# Patient Record
Sex: MALE | Race: WHITE | NOT HISPANIC OR LATINO | Employment: OTHER | URBAN - METROPOLITAN AREA
[De-identification: names, ages, dates, MRNs, and addresses within clinical notes are randomized per-mention and may not be internally consistent; named-entity substitution may affect disease eponyms.]

---

## 2023-06-27 ENCOUNTER — APPOINTMENT (OUTPATIENT)
Dept: CT IMAGING | Facility: CLINIC | Age: 68
DRG: 824 | End: 2023-06-27
Attending: EMERGENCY MEDICINE
Payer: MEDICARE

## 2023-06-27 ENCOUNTER — HOSPITAL ENCOUNTER (INPATIENT)
Facility: CLINIC | Age: 68
LOS: 13 days | Discharge: NURSING, REHAB OR LONG-TERM CARE | DRG: 824 | End: 2023-07-10
Attending: PHYSICIAN ASSISTANT | Admitting: INTERNAL MEDICINE
Payer: MEDICARE

## 2023-06-27 ENCOUNTER — APPOINTMENT (OUTPATIENT)
Dept: ULTRASOUND IMAGING | Facility: CLINIC | Age: 68
DRG: 824 | End: 2023-06-27
Attending: EMERGENCY MEDICINE
Payer: MEDICARE

## 2023-06-27 DIAGNOSIS — M79.89 RIGHT LEG SWELLING: Primary | ICD-10-CM

## 2023-06-27 DIAGNOSIS — E87.6 HYPOKALEMIA: ICD-10-CM

## 2023-06-27 DIAGNOSIS — C83.32 DIFFUSE LARGE B-CELL LYMPHOMA OF INTRATHORACIC LYMPH NODES (H): ICD-10-CM

## 2023-06-27 DIAGNOSIS — C16.9 MALIGNANT NEOPLASM OF STOMACH, UNSPECIFIED LOCATION (H): ICD-10-CM

## 2023-06-27 DIAGNOSIS — C79.9 METASTATIC MALIGNANT NEOPLASM, UNSPECIFIED SITE (H): ICD-10-CM

## 2023-06-27 DIAGNOSIS — I10 BENIGN ESSENTIAL HYPERTENSION: ICD-10-CM

## 2023-06-27 DIAGNOSIS — R33.9 URINARY RETENTION WITH INCOMPLETE BLADDER EMPTYING: ICD-10-CM

## 2023-06-27 DIAGNOSIS — D61.89 ANEMIA DUE TO OTHER BONE MARROW FAILURE (H): ICD-10-CM

## 2023-06-27 DIAGNOSIS — M54.9 ACUTE BILATERAL BACK PAIN, UNSPECIFIED BACK LOCATION: ICD-10-CM

## 2023-06-27 LAB
ALBUMIN SERPL BCG-MCNC: 4 G/DL (ref 3.5–5.2)
ALBUMIN UR-MCNC: NEGATIVE MG/DL
ALP SERPL-CCNC: 82 U/L (ref 40–129)
ALT SERPL W P-5'-P-CCNC: 18 U/L (ref 0–70)
ANION GAP SERPL CALCULATED.3IONS-SCNC: 13 MMOL/L (ref 7–15)
APPEARANCE UR: CLEAR
AST SERPL W P-5'-P-CCNC: 19 U/L (ref 0–45)
BASOPHILS # BLD AUTO: 0.1 10E3/UL (ref 0–0.2)
BASOPHILS NFR BLD AUTO: 1 %
BILIRUB SERPL-MCNC: 0.3 MG/DL
BILIRUB UR QL STRIP: NEGATIVE
BUN SERPL-MCNC: 17.3 MG/DL (ref 8–23)
CALCIUM SERPL-MCNC: 8.9 MG/DL (ref 8.8–10.2)
CHLORIDE SERPL-SCNC: 98 MMOL/L (ref 98–107)
COLOR UR AUTO: ABNORMAL
CREAT SERPL-MCNC: 0.74 MG/DL (ref 0.67–1.17)
D DIMER PPP FEU-MCNC: 0.69 UG/ML FEU (ref 0–0.5)
DEPRECATED HCO3 PLAS-SCNC: 24 MMOL/L (ref 22–29)
EOSINOPHIL # BLD AUTO: 0.2 10E3/UL (ref 0–0.7)
EOSINOPHIL NFR BLD AUTO: 3 %
ERYTHROCYTE [DISTWIDTH] IN BLOOD BY AUTOMATED COUNT: 14.6 % (ref 10–15)
GFR SERPL CREATININE-BSD FRML MDRD: >90 ML/MIN/1.73M2
GLUCOSE SERPL-MCNC: 96 MG/DL (ref 70–99)
GLUCOSE UR STRIP-MCNC: NEGATIVE MG/DL
HCT VFR BLD AUTO: 40.5 % (ref 40–53)
HGB BLD-MCNC: 13.8 G/DL (ref 13.3–17.7)
HGB UR QL STRIP: NEGATIVE
IMM GRANULOCYTES # BLD: 0 10E3/UL
IMM GRANULOCYTES NFR BLD: 0 %
KETONES UR STRIP-MCNC: NEGATIVE MG/DL
LEUKOCYTE ESTERASE UR QL STRIP: NEGATIVE
LIPASE SERPL-CCNC: 21 U/L (ref 13–60)
LYMPHOCYTES # BLD AUTO: 2 10E3/UL (ref 0.8–5.3)
LYMPHOCYTES NFR BLD AUTO: 26 %
MCH RBC QN AUTO: 29.1 PG (ref 26.5–33)
MCHC RBC AUTO-ENTMCNC: 34.1 G/DL (ref 31.5–36.5)
MCV RBC AUTO: 85 FL (ref 78–100)
MONOCYTES # BLD AUTO: 0.8 10E3/UL (ref 0–1.3)
MONOCYTES NFR BLD AUTO: 11 %
MUCOUS THREADS #/AREA URNS LPF: PRESENT /LPF
NEUTROPHILS # BLD AUTO: 4.7 10E3/UL (ref 1.6–8.3)
NEUTROPHILS NFR BLD AUTO: 59 %
NITRATE UR QL: NEGATIVE
NRBC # BLD AUTO: 0 10E3/UL
NRBC BLD AUTO-RTO: 0 /100
PH UR STRIP: 6 [PH] (ref 5–7)
PLATELET # BLD AUTO: 246 10E3/UL (ref 150–450)
POTASSIUM SERPL-SCNC: 2.7 MMOL/L (ref 3.4–5.3)
PROT SERPL-MCNC: 6.7 G/DL (ref 6.4–8.3)
RBC # BLD AUTO: 4.74 10E6/UL (ref 4.4–5.9)
RBC URINE: 1 /HPF
SODIUM SERPL-SCNC: 135 MMOL/L (ref 136–145)
SP GR UR STRIP: 1.01 (ref 1–1.03)
SQUAMOUS EPITHELIAL: <1 /HPF
TROPONIN T SERPL HS-MCNC: 7 NG/L
UROBILINOGEN UR STRIP-MCNC: NORMAL MG/DL
WBC # BLD AUTO: 7.9 10E3/UL (ref 4–11)
WBC URINE: <1 /HPF

## 2023-06-27 PROCEDURE — 99285 EMERGENCY DEPT VISIT HI MDM: CPT | Mod: 25

## 2023-06-27 PROCEDURE — 250N000013 HC RX MED GY IP 250 OP 250 PS 637: Performed by: PHYSICIAN ASSISTANT

## 2023-06-27 PROCEDURE — 250N000011 HC RX IP 250 OP 636: Performed by: EMERGENCY MEDICINE

## 2023-06-27 PROCEDURE — 250N000009 HC RX 250: Performed by: EMERGENCY MEDICINE

## 2023-06-27 PROCEDURE — 93005 ELECTROCARDIOGRAM TRACING: CPT

## 2023-06-27 PROCEDURE — 120N000001 HC R&B MED SURG/OB

## 2023-06-27 PROCEDURE — 84484 ASSAY OF TROPONIN QUANT: CPT | Performed by: EMERGENCY MEDICINE

## 2023-06-27 PROCEDURE — 36415 COLL VENOUS BLD VENIPUNCTURE: CPT | Performed by: EMERGENCY MEDICINE

## 2023-06-27 PROCEDURE — 74177 CT ABD & PELVIS W/CONTRAST: CPT | Mod: MA

## 2023-06-27 PROCEDURE — 85025 COMPLETE CBC W/AUTO DIFF WBC: CPT | Performed by: EMERGENCY MEDICINE

## 2023-06-27 PROCEDURE — 83690 ASSAY OF LIPASE: CPT | Performed by: EMERGENCY MEDICINE

## 2023-06-27 PROCEDURE — 80053 COMPREHEN METABOLIC PANEL: CPT | Performed by: EMERGENCY MEDICINE

## 2023-06-27 PROCEDURE — 99222 1ST HOSP IP/OBS MODERATE 55: CPT | Mod: A1 | Performed by: INTERNAL MEDICINE

## 2023-06-27 PROCEDURE — 93971 EXTREMITY STUDY: CPT | Mod: RT

## 2023-06-27 PROCEDURE — 81001 URINALYSIS AUTO W/SCOPE: CPT | Performed by: EMERGENCY MEDICINE

## 2023-06-27 PROCEDURE — 85379 FIBRIN DEGRADATION QUANT: CPT | Performed by: EMERGENCY MEDICINE

## 2023-06-27 RX ORDER — CYCLOBENZAPRINE HCL 5 MG
5-10 TABLET ORAL EVERY 8 HOURS PRN
Status: ON HOLD | COMMUNITY
End: 2023-07-10

## 2023-06-27 RX ORDER — IOPAMIDOL 755 MG/ML
115 INJECTION, SOLUTION INTRAVASCULAR ONCE
Status: COMPLETED | OUTPATIENT
Start: 2023-06-27 | End: 2023-06-27

## 2023-06-27 RX ORDER — VALSARTAN AND HYDROCHLOROTHIAZIDE 320; 25 MG/1; MG/1
1 TABLET, FILM COATED ORAL DAILY
Status: ON HOLD | COMMUNITY
End: 2023-07-10

## 2023-06-27 RX ORDER — ASPIRIN 325 MG
325 TABLET, DELAYED RELEASE (ENTERIC COATED) ORAL AT BEDTIME
COMMUNITY

## 2023-06-27 RX ORDER — VALACYCLOVIR HYDROCHLORIDE 1 G/1
1000 TABLET, FILM COATED ORAL 3 TIMES DAILY
COMMUNITY
End: 2023-06-27

## 2023-06-27 RX ORDER — TRIAMCINOLONE ACETONIDE 1 MG/G
OINTMENT TOPICAL 2 TIMES DAILY
COMMUNITY
End: 2023-06-27

## 2023-06-27 RX ORDER — CHLORAL HYDRATE 500 MG
2 CAPSULE ORAL EVERY MORNING
COMMUNITY

## 2023-06-27 RX ORDER — ATORVASTATIN CALCIUM 10 MG/1
10 TABLET, FILM COATED ORAL DAILY
COMMUNITY

## 2023-06-27 RX ORDER — POTASSIUM CHLORIDE 1500 MG/1
40 TABLET, EXTENDED RELEASE ORAL ONCE
Status: COMPLETED | OUTPATIENT
Start: 2023-06-27 | End: 2023-06-27

## 2023-06-27 RX ADMIN — SODIUM CHLORIDE 100 ML: 9 INJECTION, SOLUTION INTRAVENOUS at 20:34

## 2023-06-27 RX ADMIN — IOPAMIDOL 115 ML: 755 INJECTION, SOLUTION INTRAVENOUS at 20:34

## 2023-06-27 RX ADMIN — POTASSIUM CHLORIDE 40 MEQ: 1500 TABLET, EXTENDED RELEASE ORAL at 21:12

## 2023-06-27 ASSESSMENT — ACTIVITIES OF DAILY LIVING (ADL)
ADLS_ACUITY_SCORE: 35
ADLS_ACUITY_SCORE: 35

## 2023-06-27 NOTE — LETTER
Transition Communication Hand-off for Care Transitions to Next Level of Care Provider    Name: Enrique Dumas  : 1955  MRN #: [id number]  Primary Care Provider: Lori Hallman     Primary Clinic: [address]  [facility] [address]     Reason for Hospitalization:  Hypokalemia [E87.6]  Right leg swelling [M79.89]  Metastatic malignant neoplasm, unspecified site (H) [C79.9]  Admit Date/Time: 2023  7:31 PM  Discharge Date: ***  Payor Source: Payor: MEDICARE / Plan: MEDICARE / Product Type: Medicare /     Readmission Assessment Measure (AYDEE) Risk Score/category: ***    Plan of Care Goals/Milestone Events:   Patient Concerns: ***   Patient Goals:   Short-term ***   Long-term ***   Medical Goals   Short-term ***   Long-term ***         Reason for Communication Hand-off Referral: {CCREASONCMCTNHANDOFFREFERRALCTS:18519}    Discharge Plan:       Concern for non-adherence with plan of care:   Y/N ***  Discharge Needs Assessment:  Needs      Flowsheet Row Most Recent Value   Equipment Currently Used at Home none   # of Referrals Placed by  Post Acute Facilities, Senior Linkage Line, Transportation            Already enrolled in Tele-monitoring program and name of program:  ***  Follow-up specialty is recommended: {YES / NO:02494}    Follow-up plan:    Future Appointments   Date Time Provider Department Center   7/10/2023  1:30 PM  OT 2 [facility]       Any outstanding tests or procedures:        Referrals       Future Labs/Procedures    Occupational Therapy Adult Consult     Comments:    Evaluate and treat as clinically indicated.    Reason:  metastatic lymphoma    Physical Therapy Adult Consult     Comments:    Evaluate and treat as clinically indicated.    Reason:  metastatic Lymphoma              Head Recommendations:      CADEN Xie    AVS/Discharge Summary is the source of truth; this is a helpful guide for improved communication of patient story

## 2023-06-27 NOTE — ED NOTES
"PIT/Triage Evaluation    Patient presented with right lower extremity swelling as well as left flank pain.  They are visiting from [facility] getting ready for a trip to [place].  He denies shortness of breath.  No history of blood clot.  He states he has had abdominal pain for approximately 6 months and has had an ultrasound and lab work which was unremarkable.    Exam is notable for:  /77   Pulse 76   Temp 97.3  F (36.3  C) (Temporal)   Resp 20   Ht 1.803 m (5' 11\")   Wt 104.3 kg (230 lb)   SpO2 96%   BMI 32.08 kg/m     General:  Alert, interactive  Cardiovascular:  Well perfused, right lower extremity is mildly swollen  Lungs:  No respiratory distress, no accessory muscle use  Neuro:  Moving all 4 extremities  Skin:  Warm, dry  Psych:  Normal affect    Appropriate interventions for symptom management were initiated if applicable.  Appropriate diagnostic tests were initiated if indicated.    Important information for subsequent clinician:  Screening blood work, EKG, right lower extremity ultrasound ordered.    I briefly evaluated the patient and developed an initial plan of care. I discussed this plan and explained that this brief interaction does not constitute a full evaluation. Patient/family understands that they should wait to be fully evaluated and discuss any test results with another clinician prior to leaving the hospital.       Trigger, Phil Cordoba MD  06/27/23 4486    "

## 2023-06-27 NOTE — ED TRIAGE NOTES
Pt reports having increased RLE edema. Pt noted increased swelling this morning.      Triage Assessment     Row Name 06/27/23 1422       Triage Assessment (Adult)    Airway WDL WDL       Respiratory WDL    Respiratory WDL WDL       Skin Circulation/Temperature WDL    Skin Circulation/Temperature WDL X  RLE edema       Cardiac WDL    Cardiac WDL WDL       Peripheral/Neurovascular WDL    Peripheral Neurovascular WDL X  Bilat LE numbness.       Cognitive/Neuro/Behavioral WDL    Cognitive/Neuro/Behavioral WDL WDL

## 2023-06-28 ENCOUNTER — APPOINTMENT (OUTPATIENT)
Dept: MRI IMAGING | Facility: CLINIC | Age: 68
DRG: 824 | End: 2023-06-28
Attending: PHYSICIAN ASSISTANT
Payer: MEDICARE

## 2023-06-28 ENCOUNTER — APPOINTMENT (OUTPATIENT)
Dept: CT IMAGING | Facility: CLINIC | Age: 68
DRG: 824 | End: 2023-06-28
Attending: INTERNAL MEDICINE
Payer: MEDICARE

## 2023-06-28 LAB
ALBUMIN SERPL BCG-MCNC: 4.1 G/DL (ref 3.5–5.2)
ALP SERPL-CCNC: 91 U/L (ref 40–129)
ALT SERPL W P-5'-P-CCNC: 19 U/L (ref 0–70)
ANION GAP SERPL CALCULATED.3IONS-SCNC: 12 MMOL/L (ref 7–15)
AST SERPL W P-5'-P-CCNC: 21 U/L (ref 0–45)
ATRIAL RATE - MUSE: 85 BPM
BILIRUB SERPL-MCNC: 0.7 MG/DL
BUN SERPL-MCNC: 17.6 MG/DL (ref 8–23)
CALCIUM SERPL-MCNC: 9.4 MG/DL (ref 8.8–10.2)
CHLORIDE SERPL-SCNC: 94 MMOL/L (ref 98–107)
CREAT SERPL-MCNC: 0.81 MG/DL (ref 0.67–1.17)
DEPRECATED HCO3 PLAS-SCNC: 26 MMOL/L (ref 22–29)
DIASTOLIC BLOOD PRESSURE - MUSE: NORMAL MMHG
ERYTHROCYTE [DISTWIDTH] IN BLOOD BY AUTOMATED COUNT: 14.6 % (ref 10–15)
GFR SERPL CREATININE-BSD FRML MDRD: >90 ML/MIN/1.73M2
GLUCOSE SERPL-MCNC: 111 MG/DL (ref 70–99)
HCT VFR BLD AUTO: 43.3 % (ref 40–53)
HGB BLD-MCNC: 14.7 G/DL (ref 13.3–17.7)
INTERPRETATION ECG - MUSE: NORMAL
MCH RBC QN AUTO: 29.4 PG (ref 26.5–33)
MCHC RBC AUTO-ENTMCNC: 33.9 G/DL (ref 31.5–36.5)
MCV RBC AUTO: 87 FL (ref 78–100)
P AXIS - MUSE: -21 DEGREES
PLATELET # BLD AUTO: 244 10E3/UL (ref 150–450)
POTASSIUM SERPL-SCNC: 3.6 MMOL/L (ref 3.4–5.3)
PR INTERVAL - MUSE: 194 MS
PROT SERPL-MCNC: 7.1 G/DL (ref 6.4–8.3)
QRS DURATION - MUSE: 78 MS
QT - MUSE: 362 MS
QTC - MUSE: 430 MS
R AXIS - MUSE: -6 DEGREES
RBC # BLD AUTO: 5 10E6/UL (ref 4.4–5.9)
SODIUM SERPL-SCNC: 132 MMOL/L (ref 136–145)
SYSTOLIC BLOOD PRESSURE - MUSE: NORMAL MMHG
T AXIS - MUSE: -17 DEGREES
UPPER EUS: NORMAL
UPPER GI ENDOSCOPY: NORMAL
VENTRICULAR RATE- MUSE: 85 BPM
WBC # BLD AUTO: 7 10E3/UL (ref 4–11)

## 2023-06-28 PROCEDURE — 250N000011 HC RX IP 250 OP 636: Performed by: EMERGENCY MEDICINE

## 2023-06-28 PROCEDURE — C9113 INJ PANTOPRAZOLE SODIUM, VIA: HCPCS | Mod: JZ | Performed by: PHYSICIAN ASSISTANT

## 2023-06-28 PROCEDURE — G1010 CDSM STANSON: HCPCS

## 2023-06-28 PROCEDURE — 88342 IMHCHEM/IMCYTCHM 1ST ANTB: CPT | Mod: 26 | Performed by: PATHOLOGY

## 2023-06-28 PROCEDURE — G0500 MOD SEDAT ENDO SERVICE >5YRS: HCPCS | Performed by: INTERNAL MEDICINE

## 2023-06-28 PROCEDURE — 250N000009 HC RX 250: Performed by: INTERNAL MEDICINE

## 2023-06-28 PROCEDURE — 99223 1ST HOSP IP/OBS HIGH 75: CPT | Performed by: INTERNAL MEDICINE

## 2023-06-28 PROCEDURE — 120N000001 HC R&B MED SURG/OB

## 2023-06-28 PROCEDURE — 43242 EGD US FINE NEEDLE BX/ASPIR: CPT | Performed by: INTERNAL MEDICINE

## 2023-06-28 PROCEDURE — 88271 CYTOGENETICS DNA PROBE: CPT | Performed by: INTERNAL MEDICINE

## 2023-06-28 PROCEDURE — 258N000003 HC RX IP 258 OP 636: Performed by: INTERNAL MEDICINE

## 2023-06-28 PROCEDURE — 250N000011 HC RX IP 250 OP 636: Performed by: INTERNAL MEDICINE

## 2023-06-28 PROCEDURE — 72141 MRI NECK SPINE W/O DYE: CPT | Mod: MF

## 2023-06-28 PROCEDURE — 88341 IMHCHEM/IMCYTCHM EA ADD ANTB: CPT | Mod: 26 | Performed by: PATHOLOGY

## 2023-06-28 PROCEDURE — 88305 TISSUE EXAM BY PATHOLOGIST: CPT | Mod: 26 | Performed by: PATHOLOGY

## 2023-06-28 PROCEDURE — 250N000012 HC RX MED GY IP 250 OP 636 PS 637: Performed by: INTERNAL MEDICINE

## 2023-06-28 PROCEDURE — 36415 COLL VENOUS BLD VENIPUNCTURE: CPT | Performed by: INTERNAL MEDICINE

## 2023-06-28 PROCEDURE — 250N000011 HC RX IP 250 OP 636: Mod: JZ | Performed by: PHYSICIAN ASSISTANT

## 2023-06-28 PROCEDURE — 80053 COMPREHEN METABOLIC PANEL: CPT | Performed by: INTERNAL MEDICINE

## 2023-06-28 PROCEDURE — 250N000013 HC RX MED GY IP 250 OP 250 PS 637: Performed by: INTERNAL MEDICINE

## 2023-06-28 PROCEDURE — 88305 TISSUE EXAM BY PATHOLOGIST: CPT | Mod: TC | Performed by: INTERNAL MEDICINE

## 2023-06-28 PROCEDURE — 43239 EGD BIOPSY SINGLE/MULTIPLE: CPT | Performed by: INTERNAL MEDICINE

## 2023-06-28 PROCEDURE — 85014 HEMATOCRIT: CPT | Performed by: INTERNAL MEDICINE

## 2023-06-28 PROCEDURE — 0DB68ZX EXCISION OF STOMACH, VIA NATURAL OR ARTIFICIAL OPENING ENDOSCOPIC, DIAGNOSTIC: ICD-10-PCS | Performed by: INTERNAL MEDICINE

## 2023-06-28 PROCEDURE — 88369 M/PHMTRC ALYSISHQUANT/SEMIQ: CPT | Mod: 26 | Performed by: MEDICAL GENETICS

## 2023-06-28 PROCEDURE — 88368 INSITU HYBRIDIZATION MANUAL: CPT | Mod: 26 | Performed by: MEDICAL GENETICS

## 2023-06-28 PROCEDURE — 99232 SBSQ HOSP IP/OBS MODERATE 35: CPT | Performed by: INTERNAL MEDICINE

## 2023-06-28 PROCEDURE — 88360 TUMOR IMMUNOHISTOCHEM/MANUAL: CPT | Mod: 26 | Performed by: PATHOLOGY

## 2023-06-28 RX ORDER — LIDOCAINE 40 MG/G
CREAM TOPICAL
Status: CANCELLED | OUTPATIENT
Start: 2023-06-28

## 2023-06-28 RX ORDER — ONDANSETRON 2 MG/ML
4 INJECTION INTRAMUSCULAR; INTRAVENOUS EVERY 6 HOURS PRN
Status: DISCONTINUED | OUTPATIENT
Start: 2023-06-28 | End: 2023-07-10 | Stop reason: HOSPADM

## 2023-06-28 RX ORDER — CYCLOBENZAPRINE HCL 5 MG
5-10 TABLET ORAL EVERY 8 HOURS PRN
Status: DISCONTINUED | OUTPATIENT
Start: 2023-06-28 | End: 2023-07-10 | Stop reason: HOSPADM

## 2023-06-28 RX ORDER — NALOXONE HYDROCHLORIDE 0.4 MG/ML
0.4 INJECTION, SOLUTION INTRAMUSCULAR; INTRAVENOUS; SUBCUTANEOUS
Status: DISCONTINUED | OUTPATIENT
Start: 2023-06-28 | End: 2023-07-10 | Stop reason: HOSPADM

## 2023-06-28 RX ORDER — FENTANYL CITRATE 50 UG/ML
INJECTION, SOLUTION INTRAMUSCULAR; INTRAVENOUS PRN
Status: DISCONTINUED | OUTPATIENT
Start: 2023-06-28 | End: 2023-06-28 | Stop reason: HOSPADM

## 2023-06-28 RX ORDER — FLUMAZENIL 0.1 MG/ML
0.2 INJECTION, SOLUTION INTRAVENOUS
Status: ACTIVE | OUTPATIENT
Start: 2023-06-28 | End: 2023-06-29

## 2023-06-28 RX ORDER — FLUMAZENIL 0.1 MG/ML
0.2 INJECTION, SOLUTION INTRAVENOUS
Status: DISCONTINUED | OUTPATIENT
Start: 2023-06-28 | End: 2023-06-28

## 2023-06-28 RX ORDER — NALOXONE HYDROCHLORIDE 0.4 MG/ML
0.2 INJECTION, SOLUTION INTRAMUSCULAR; INTRAVENOUS; SUBCUTANEOUS
Status: DISCONTINUED | OUTPATIENT
Start: 2023-06-28 | End: 2023-07-10 | Stop reason: HOSPADM

## 2023-06-28 RX ORDER — OXYCODONE HYDROCHLORIDE 5 MG/1
5 TABLET ORAL EVERY 4 HOURS PRN
Status: DISCONTINUED | OUTPATIENT
Start: 2023-06-28 | End: 2023-06-29

## 2023-06-28 RX ORDER — MORPHINE SULFATE 4 MG/ML
4 INJECTION, SOLUTION INTRAMUSCULAR; INTRAVENOUS ONCE
Status: COMPLETED | OUTPATIENT
Start: 2023-06-28 | End: 2023-06-28

## 2023-06-28 RX ORDER — SODIUM CHLORIDE, SODIUM LACTATE, POTASSIUM CHLORIDE, CALCIUM CHLORIDE 600; 310; 30; 20 MG/100ML; MG/100ML; MG/100ML; MG/100ML
INJECTION, SOLUTION INTRAVENOUS CONTINUOUS
Status: ACTIVE | OUTPATIENT
Start: 2023-06-28 | End: 2023-06-29

## 2023-06-28 RX ORDER — HYDROMORPHONE HYDROCHLORIDE 1 MG/ML
0.3 INJECTION, SOLUTION INTRAMUSCULAR; INTRAVENOUS; SUBCUTANEOUS
Status: DISCONTINUED | OUTPATIENT
Start: 2023-06-28 | End: 2023-07-10 | Stop reason: HOSPADM

## 2023-06-28 RX ORDER — LIDOCAINE 40 MG/G
CREAM TOPICAL
Status: DISCONTINUED | OUTPATIENT
Start: 2023-06-28 | End: 2023-07-02

## 2023-06-28 RX ORDER — ATORVASTATIN CALCIUM 10 MG/1
10 TABLET, FILM COATED ORAL DAILY
Status: DISCONTINUED | OUTPATIENT
Start: 2023-06-28 | End: 2023-07-10 | Stop reason: HOSPADM

## 2023-06-28 RX ORDER — DEXAMETHASONE 4 MG/1
4 TABLET ORAL EVERY 12 HOURS SCHEDULED
Status: DISCONTINUED | OUTPATIENT
Start: 2023-06-28 | End: 2023-07-06 | Stop reason: ALTCHOICE

## 2023-06-28 RX ORDER — ONDANSETRON 4 MG/1
4 TABLET, ORALLY DISINTEGRATING ORAL EVERY 6 HOURS PRN
Status: DISCONTINUED | OUTPATIENT
Start: 2023-06-28 | End: 2023-07-10 | Stop reason: HOSPADM

## 2023-06-28 RX ADMIN — ATORVASTATIN CALCIUM 10 MG: 10 TABLET, FILM COATED ORAL at 08:42

## 2023-06-28 RX ADMIN — SODIUM CHLORIDE, POTASSIUM CHLORIDE, SODIUM LACTATE AND CALCIUM CHLORIDE: 600; 310; 30; 20 INJECTION, SOLUTION INTRAVENOUS at 08:05

## 2023-06-28 RX ADMIN — HYDROMORPHONE HYDROCHLORIDE 0.3 MG: 1 INJECTION, SOLUTION INTRAMUSCULAR; INTRAVENOUS; SUBCUTANEOUS at 19:06

## 2023-06-28 RX ADMIN — OXYCODONE HYDROCHLORIDE 5 MG: 5 TABLET ORAL at 00:32

## 2023-06-28 RX ADMIN — HYDROCHLOROTHIAZIDE: 25 TABLET ORAL at 08:42

## 2023-06-28 RX ADMIN — OXYCODONE HYDROCHLORIDE 5 MG: 5 TABLET ORAL at 14:14

## 2023-06-28 RX ADMIN — HYDROMORPHONE HYDROCHLORIDE 0.3 MG: 1 INJECTION, SOLUTION INTRAMUSCULAR; INTRAVENOUS; SUBCUTANEOUS at 07:17

## 2023-06-28 RX ADMIN — HYDROMORPHONE HYDROCHLORIDE 0.3 MG: 1 INJECTION, SOLUTION INTRAMUSCULAR; INTRAVENOUS; SUBCUTANEOUS at 10:27

## 2023-06-28 RX ADMIN — PANTOPRAZOLE SODIUM 40 MG: 40 INJECTION, POWDER, FOR SOLUTION INTRAVENOUS at 08:42

## 2023-06-28 RX ADMIN — HYDROMORPHONE HYDROCHLORIDE 0.3 MG: 1 INJECTION, SOLUTION INTRAMUSCULAR; INTRAVENOUS; SUBCUTANEOUS at 12:58

## 2023-06-28 RX ADMIN — DEXAMETHASONE 4 MG: 4 TABLET ORAL at 21:44

## 2023-06-28 RX ADMIN — MORPHINE SULFATE 4 MG: 4 INJECTION, SOLUTION INTRAMUSCULAR; INTRAVENOUS at 01:23

## 2023-06-28 ASSESSMENT — ACTIVITIES OF DAILY LIVING (ADL)
ADLS_ACUITY_SCORE: 26
ADLS_ACUITY_SCORE: 27
ADLS_ACUITY_SCORE: 27
ADLS_ACUITY_SCORE: 35
ADLS_ACUITY_SCORE: 27
ADLS_ACUITY_SCORE: 27
ADLS_ACUITY_SCORE: 35
ADLS_ACUITY_SCORE: 27
ADLS_ACUITY_SCORE: 35
ADLS_ACUITY_SCORE: 35
ADLS_ACUITY_SCORE: 27
ADLS_ACUITY_SCORE: 27

## 2023-06-28 NOTE — PROGRESS NOTES
RECEIVING UNIT ED HANDOFF REVIEW    ED Nurse Handoff Report was reviewed by: Víctor Rivera RN on June 28, 2023 at 4:08 AM

## 2023-06-28 NOTE — PROGRESS NOTES
[facility]    Internal Medicine Hospitalist Progress Note  06/28/2023  I evaluated patient on the above date.    Rohan Nickerson Jr., MD  [phone] (p)  Text Page  Vocera        Assessment & Plan New actions/orders today (06/28/2023) are underlined. All lab results in the assessment and plan were reviewed.    Enrique Dumas is a 67 year old male with history including HTN; HLD; and congenital [place]; who presented 6/27/2023 with right leg swelling with recent back pain. 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     On initially evaluation, pt was afebrile, hemodynamically stable, not hypoxic. ECG showed SR with NSTWA inferior limb leads, no acute ischemic changes. Labs notable for CBC normal; BMP with sodium 135, potassium 2.7; LFT's and lipase normal; trop negative; d-dimer 0.69; UA negative for signs of infection.    Right lower extremity US 6/28 negative for DVT.     CT CAP 6/28 showed no evidence for pulmonary emboli; proximal gastric wall thickening concerning for an infiltrative or neoplastic process which included a soft tissue mass along the posterior margin of the proximal stomach confluent with the adjacent spleen and pancreatic tail; sclerotic changes involving the T10 -T12 vertebral bodies with destructive changes of T12 and abnormal surrounding paravertebral soft tissue extending into the posterior mediastinum, findings also concerning for a neoplastic process; indeterminate 9 mm pulmonary nodule right lung apex could represent a metastatic lesion; hypodense right hepatic lobe lesion concerning for metastasis. Splenic metastasis also possible.    MRI C-spine and L-spine 6/27 negative for acute findings. MRI T-spine 6/27 showed diffuse infiltrative process of the bone marrow of the thoracic spine both anteriorly and posteriorly with extraosseous extension a diffuse involvement of the neural foramen consistent with diffuse  metastatic disease; extraosseous extension leads canal compromise and neural foraminal narrowing from the T8 to the T10-T11 disc space level; no abnormal signal thoracic spinal cord; prominent involvement of the posterior ribs in the thoracic region with extension into the soft tissues.      Suspected malignancy involving the stomach/GI and thoracic spine.  Back pain and abdominal pain, suspect related to above.  * Initial presentation and imaging as above. Pt presented with right lower extremity edema but had been having back pain since the beginning of the year; also had c/o abdominal pain. GI and Oncology consulted on admit. Started on IV pantoprazole on admit.  - Continue NPO for now.  - Continue LR at 75 ml/hr.  - Continue pantoprazole IV daily.  - Plan EGD and EUS today 6/28.  - Oncology consult pending.  - Continue PRN acetaminophen, PRN cyclobenzaprine, PRN oxycodone, PRN IV hydromorphone; minimize opioids as able.  - Appreciate consultant help - I d/w GI 6/28.    RLE swelling, question related to above.  * Initial presentation as above. US showed no evidence of DVT.  - Continue to keep right lower extremity elevated.  - Malignancy workup as above.    Hypertension (benign essential).  [PTA: valsartan-HCTZ 320-25 mg daily.]  * Valsartan-HCTZ held on admit due to hypokalemia.  - Continue to hold valsartan-HCTZ.    Hypokalemia.  * Potassium low on admit, suspect due to decreased PO intake and diuretic. PTA diuretic held on admit.  Recent Labs   Lab 06/27/23  1856   POTASSIUM 2.7*   - Continue PRN K replacement.    Hyperlipidemia/dyslipidemia.  - Continue atorvastatin.      Clinically Significant Risk Factors Present on Admission        # Hypokalemia: Lowest K = 2.7 mmol/L in last 2 days, will replace as needed         # Drug Induced Platelet Defect: home medication list includes an antiplatelet medication   # Hypertension: Home medication list includes antihypertensive(s)      # Obesity: Estimated body mass  "index is 32.08 kg/m  as calculated from the following:    Height as of this encounter: 1.803 m (5' 11\").    Weight as of this encounter: 104.3 kg (230 lb).              COVID-19 testing.  COVID-19 PCR Results        9/20/2021    10:02   COVID-19 PCR Results   COVID-19 Virus by PCR (External Result) Negative        Details          This result is from an external source.         COVID-19 Antibody Results, Testing for Immunity         No data to display                Diet: NPO per Anesthesia Guidelines for Procedure/Surgery Except for: Meds    Prophylaxis: PCD's, ambulation.   Downing Catheter: Not present  Lines: None     Code Status: Full Code    Disposition Plan   Expected discharge: 1-2d recommended to prior living arrangement pending above.  Entered: Rohan Nickerson MD 06/28/2023, 7:18 AM       Communication.  - I d/w pt's wife 6/28.    Interval History    Back and abdominal pain better with meds.  No N/V.    -Data reviewed today: I reviewed all new labs and imaging over the last 24 hours. I personally reviewed no images or EKG's today.    Physical Exam    , Blood pressure 130/78, pulse 80, temperature 97.3  F (36.3  C), temperature source Temporal, resp. rate 18, height 1.803 m (5' 11\"), weight 104.3 kg (230 lb), SpO2 97 %. O2 Device: None (Room air)    Vitals:    06/27/23 1845   Weight: 104.3 kg (230 lb)     Vital Signs with Ranges  Temp:  [97.3  F (36.3  C)] 97.3  F (36.3  C)  Pulse:  [76-80] 80  Resp:  [18-20] 18  BP: (120-130)/(77-78) 130/78  SpO2:  [96 %-97 %] 97 %  Patient Vitals for the past 24 hrs:   BP Temp Temp src Pulse Resp SpO2 Height Weight   06/27/23 1957 130/78 -- -- 80 18 97 % -- --   06/27/23 1845 120/77 97.3  F (36.3  C) Temporal 76 20 96 % 1.803 m (5' 11\") 104.3 kg (230 lb)     I/O's Last 24 hours  No intake/output data recorded.    Constitutional: Awake, alert, oriented.  Respiratory: Diminished in bases. No crackles or wheezes.  Cardiovascular: RRR, no m/r/g.  GI: Mild distension, nt to " light touch, few BS.  Skin/Integumen:   Other:        Data    Labs reviewed.  Recent Labs   Lab 06/27/23 1856   WBC 7.9   HGB 13.8   MCV 85      *   POTASSIUM 2.7*   CHLORIDE 98   CO2 24   BUN 17.3   CR 0.74   ANIONGAP 13   ESTEFANY 8.9   GLC 96   ALBUMIN 4.0   PROTTOTAL 6.7   BILITOTAL 0.3   ALKPHOS 82   ALT 18   AST 19   LIPASE 21     Recent Labs   Lab Test 06/27/23 1856   TROPONIN T HIGH SENSITIVITY 7     Recent Labs   Lab 06/27/23  1856   GLC 96     No lab results found.    No results for input(s): INR, [id number] in the last 168 hours.  Recent Labs   Lab 06/27/23 1856   WBC 7.9   DD 0.69*       MICRO:  CULTURES (INCLUDING BLOOD AND URINE):  No lab results found in last 7 days.    Recent Results (from the past 24 hour(s))   US Lower Extremity Venous Duplex Right    Narrative    EXAM: US LOWER EXTREMITY VENOUS DUPLEX RIGHT  LOCATION: [facility]  DATE: 6/27/2023    INDICATION: Right lower extremity swelling.  COMPARISON: None available.  TECHNIQUE: Venous Duplex ultrasound of the right lower extremity with and without compression, augmentation and duplex. Color flow and spectral Doppler with waveform analysis performed.    FINDINGS: Exam includes the common femoral, femoral, popliteal, and contralateral common femoral veins as well as segmentally visualized deep calf veins and greater saphenous vein.     RIGHT: No deep vein thrombosis. No superficial thrombophlebitis.       Impression    IMPRESSION:    No deep venous thrombosis in the visualized right lower extremity.   CT Chest (PE) Abdomen Pelvis w Contrast    Narrative    EXAM: CT CHEST PE ABDOMEN PELVIS W CONTRAST  LOCATION: [facility]  DATE: 6/27/2023    INDICATION: Left flank pain, abdominal pain for months.  Elevated D dimer.  COMPARISON: None.  TECHNIQUE: CT chest pulmonary angiogram and routine CT abdomen pelvis with IV contrast. Arterial phase through the chest and venous phase through the  abdomen and pelvis. Multiplanar reformats and MIP reconstructions were performed. Dose reduction   techniques were used.   CONTRAST: 115mL Isovue 370    FINDINGS:  ANGIOGRAM CHEST: Pulmonary arteries are normal caliber and negative for pulmonary emboli. Thoracic aorta is negative for dissection. No CT evidence of right heart strain.     LUNGS AND PLEURA: Elevated right hemidiaphragm. Mosaic attenuation pattern both lower lobes. No acute infiltrates or effusions. 9 mm pulmonary nodule right upper lobe on image 56 of series 6.    MEDIASTINUM/AXILLAE: Calcified mediastinal and left hilar lymph nodes. Soft tissue thickening within the posterior mediastinum surrounding the descending thoracic aorta and obscuring the retrocrural space.    CORONARY ARTERY CALCIFICATION: Moderate.    HEPATOBILIARY: Diffuse fatty infiltration of the liver. Subtle hypodense right hepatic lobe lesion measuring 3.1 x 1.6 cm on image 45 of series 11.    PANCREAS: Soft tissue thickening arising from or abutting the pancreatic tail and confluent with the adjacent gastric wall and medial aspect of the spleen. The head and body of the pancreas are normal. No pancreatic ductal dilatation.    SPLEEN: Calcified granulomas and several subtle hypodense splenic lesions.    ADRENAL GLANDS: Normal.    KIDNEYS/BLADDER: Normal.    BOWEL: Wall thickening involving the cardia and proximal and mid body of the stomach. Additional soft tissue prominence adjacent to the posterior gastric wall measuring 5.1 x 1.8 cm on image 51 of series 11, abutting the medial margin of the spleen and   the pancreatic tail. The small bowel and colon are unremarkable. No evidence for obstruction. Normal appendix.    LYMPH NODES: Enlarged left lobe along the posterior gastric wall measuring 1.7 x 1.3 cm image 57 of series 11.    VASCULATURE: Incidental retroaortic left renal vein.    PELVIC ORGANS: Prostatic hypertrophy. No pelvic ascites.    MUSCULOSKELETAL: Sclerotic changes and  destructive changes of the T10 vertebral body. Additional sclerotic changes of the T10 and T11 vertebral bodies, with soft tissue thickening in the paravertebral soft tissues extending into the retroperitoneum and   surrounding the descending thoracic aorta as described above. Additional sclerotic vertebral bodies upper thoracic spine. Left inguinal hernia contains fat and a knuckle of nondilated proximal sigmoid colon.      Impression    IMPRESSION:  1.  No evidence for pulmonary emboli.  2.  Mosaic attenuation both lower lobes suggesting air trapping.  3.  No renal calculi or hydronephrosis.  4.  Proximal gastric wall thickening concerning for an infiltrative or neoplastic process. This includes a soft tissue mass along the posterior margin of the proximal stomach confluent with the adjacent spleen and pancreatic tail. GI consultation   recommended.  5.  Sclerotic changes involving the T10 -T12 vertebral bodies with destructive changes of T12 and abnormal surrounding paravertebral soft tissue extending into the posterior mediastinum. Findings also concerning for a neoplastic process. MRI of the spine   recommended for further evaluation.  6.  Indeterminate 9 mm pulmonary nodule right lung apex could represent a metastatic lesion.  7.  Hypodense right hepatic lobe lesion concerning for metastasis. Splenic metastasis also possible.  8.  Results discussed with Dr. Phil Hamm on 06/27/2023 at 9:35 PM.   Cervical spine MRI w/o contrast    Narrative    EXAM: MR CERVICAL SPINE W/O CONTRAST  LOCATION: [facility]  DATE: 6/28/2023    INDICATION: Back pain with possible metastatic disease.  COMPARISON: None.  TECHNIQUE: MRI Cervical Spine without IV contrast.    FINDINGS:   There is good anatomic alignment of the cervical spine. The vertebral body heights and the disc space heights are well-maintained throughout and have appropriate signal characteristics for the patient's age. There is no  evidence of bone marrow edema.   There is no abnormal signal or change in size of the cervical spinal cord. There is no evidence of an intracanal mass or hemorrhage. No extraspinal abnormality.    Craniovertebral junction and C1-C2: Normal.    C2-C3: Normal disc height. No herniation. Normal facets. No spinal canal or neural foraminal stenosis.     C3-C4: Normal disc height. No herniation. Normal facets. No spinal canal or neural foraminal stenosis.     C4-C5: Normal disc height. No herniation. Normal facets. No spinal canal or neural foraminal stenosis.     C5-C6: There is a mild loss of disc space height and signal. There is a shallow posterior disc osteophyte complex more prominent to the left posterior lateral region. There is no evidence of canal compromise. This along with the facet arthropathy and   uncinate spurring leads to moderate left neural foraminal narrowing.     C6-C7: There is a mild loss of disc space height and signal. There is a shallow right paracentral disc protrusion but no evidence of canal compromise. There is prominent left facet arthropathy leading to moderate left neural foraminal narrowing.     C7-T1: Normal disc height. No herniation. Normal facets. No spinal canal or neural foraminal stenosis.      Impression    IMPRESSION:  1.  Good anatomic alignment and vertebral body heights maintained.  2.  No abnormal signal cervical spinal cord.  3.  No evidence of canal compromise but neural foraminal narrowing as described above.     Thoracic spine MRI w/o contrast    Narrative    EXAM: MR THORACIC SPINE W/O CONTRAST  LOCATION: [facility]  DATE: 6/28/2023    INDICATION: metastatic disease, sclerotic lesions at T10 T12 on CT with back pain.  COMPARISON: 06/27/2023.  TECHNIQUE: Routine Thoracic Spine MRI without IV contrast.    FINDINGS:   There is good anatomic alignment of the thoracic spine. There is an infiltrative process with bone marrow edema seen involving the  T1-T5 vertebral bodies and the T8-T10 vertebral bodies which involves both the anterior and posterior elements. There is   also probable slight involvement of the anterior superior corners of the T6 and T8 vertebral bodies. This is highly suggestive of metastatic disease. There is a pathologic compression fracture with a mild loss of height along the superior and inferior   endplates of the T10 vertebral body. There is also slight bulging of the posterior wall of the T3 vertebral body but no evidence of canal compromise at this level. There is diffuse involvement of the posterior ribs throughout the thoracic region with   extension into the adjacent soft tissues.    At the T3-T5 vertebral body levels, there is extra osseous extension into the surrounding soft tissues and the neural foramen and slight extension into the left side of the spinal canal at the T3 vertebral body level but there is no evidence of canal   compromise. This does lead to neural foraminal narrowing bilaterally at the levels.    Due to the diffuse involvement of the posterior ribs with extraosseous extension there is also probable bilateral neural foraminal narrowing at the T6 and T7 vertebral body levels.    At the T8 vertebral body level, there is extra osseous extension into the ventral and dorsal portion of the left side of the mild canal along with the neural foramen bilaterally. There is no evidence of canal compromise at the T8 level. There is further   extraosseous extension into the spinal canal bilaterally from the T9-T10 to the T10-T11 disc space level.  This encases the thoracic spinal cord and leads to moderate canal compromise along with involvement of the neural foramen bilaterally.    There is no abnormal signal or change in size of the thoracic spinal cord.      Impression    IMPRESSION:  1.  Diffuse infiltrative process of the bone marrow of the thoracic spine both anteriorly and posteriorly with extraosseous extension a  diffuse involvement of the neural foramen consistent with diffuse metastatic disease.  2. Extraosseous extension leads canal compromise and  neural foraminal narrowing from the T8 to the T10-T11 disc space level.  4.  No abnormal signal thoracic spinal cord.  3. Prominent involvement of the posterior ribs in the thoracic region with extension into the soft tissues.   Lumbar spine MRI w/o contrast    Narrative    EXAM: MR LUMBAR SPINE W/O CONTRAST  LOCATION: [facility]  DATE: 6/28/2023    INDICATION: Low back pain and possible metastatic disease.  COMPARISON: None.  TECHNIQUE: Routine Lumbar Spine MRI without IV contrast.    FINDINGS:   Nomenclature is based on 5 lumbar type vertebral bodies. There is good anatomic alignment. The vertebral body heights are well-maintained throughout and have appropriate signal characteristics for the patient's age. There is no evidence of bone marrow   edema. Normal distal spinal cord and cauda equina with conus medullaris at T12.. No extraspinal abnormality. Unremarkable visualized bony pelvis.    T12-L1: Normal disc height and signal. No herniation. Normal facets. No spinal canal or neural foraminal stenosis.     L1-L2: Normal disc height and signal. No herniation. Normal facets. No spinal canal or neural foraminal stenosis.    L2-L3: Normal disc height and signal. No herniation. Normal facets. No spinal canal or neural foraminal stenosis.     L3-L4: There is a slight loss of disc space height and signal. There is a diffuse annular bulge more prominent to the posterior lateral region along with a shallow right paracentral disc protrusion. This along with the facet arthropathy leads to mild to   moderate canal compromise, mild right lateral recess narrowing and mild bilateral neural foraminal narrowing.    L4-L5: There is a mild loss of disc space height and signal. There is a mild diffuse annular bulge more prominent to the right posterior lateral region  but there is no evidence of canal compromise. There is facet arthropathy leading to right lateral   recess narrowing and minimal right neural foraminal narrowing.    L5-S1: There is a normal disc space height and signal. There is a broad central disc protrusion but no evidence of canal compromise. There is facet arthropathy but the lateral recesses and the neural foramen are patent bilaterally.      Impression    IMPRESSION:  1.  Good anatomic alignment and vertebral. Maintained and no evidence of bone marrow edema.  2.  Degenerative disc disease with canal compromise and neural foraminal narrowing from the L3-L4 to the L5-S1 disc space level as described above.       Medications   All medications were reviewed.    Infusions:    lactated ringers       Scheduled Medications:    atorvastatin  10 mg Oral Daily     sodium chloride (PF)  3 mL Intracatheter Q8H     valsartan-hydrochlorothiazide (DIOVAN HCT) 320-25 mg combo dose   Oral Daily     PRN Medications:  cyclobenzaprine, HYDROmorphone, lidocaine 4%, lidocaine (buffered or not buffered), melatonin, naloxone **OR** naloxone **OR** naloxone **OR** naloxone, ondansetron **OR** ondansetron, oxyCODONE, sodium chloride (PF)

## 2023-06-28 NOTE — H&P
"[facility]    History and Physical - Hospitalist Service       Date of Admission:  6/27/2023    Assessment & Plan   Enrique Dumas is a 67 year old male with congenital [place], history of hypertension and hyperlipidemia who presents with right leg swelling. The patient has been having back pain since the beginning of this year.  He has a 20 lbs weight loss.  He has some RLE swelling and came in and found to have abnormal imaging studies and concern for metastatic disease    1. RLE swelling  -- US showed no evidence of DVT  -- will need to complete metatstatic work up with CT of Abd/Pelvis with contrast to look for soft tissue or vascular abnormalities more proximal    2. Suspect neoplastic disease with mets  Patient has infiltrative abnormality of the stomach along with a soft tissue mass along with evidence of mets  -- NPO  -- GI consultation for possible endoscopy  -- IVF  -- [facility] Hem/Onc to see  -- note patient is from [facility] and he is closer to [facility] for future follow up.    3. Hypokalemia  -- suspected in the setting of thiazide  -- K replacement protocol    4. HTN  -- await medication reconciliation    5. HLP  -- await medication reconciliation    Diet: NPO  DVT Prophylaxis: Pneumatic Compression Devices  Downing Catheter: Not present  Lines: None     Cardiac Monitoring: None  Code Status:   Full    Clinically Significant Risk Factors Present on Admission        # Hypokalemia: Lowest K = 2.7 mmol/L in last 2 days, will replace as needed                # Obesity: Estimated body mass index is 32.08 kg/m  as calculated from the following:    Height as of this encounter: 1.803 m (5' 11\").    Weight as of this encounter: 104.3 kg (230 lb).            Disposition Plan   -- anticipate home on 6/30 vs 7/1         Terry Ortega MD  Hospitalist Service  [facility]  Securely message with [employer] (more info)  Text page via [employer] Paging/Directory "     ______________________________________________________________________    Chief Complaint   Abdominal pain, LE numbness and RLE edema, weight loss      History of Present Illness      Enrique Dumas is a 67 year old male with congenital [place], history of hypertension and hyperlipidemia who presents with right leg swelling. The patient has been having back pain since the beginning of this year.  He has chronic back pain and thought this maybe due to him shoveling improperly.  He has been seeing a chiropractor for many months without much improvement.  More recently he has seen a medical doctor and he underwent outpatient abdominal US.  The patient and his wife are from [facility] and are in the [facility] about to go on an [employer] [place] tomorrow.      The patient reports that this evening his right leg began feeling swollen and numb.  The patient discloses that the patient cannot lay on his back because he has back pain. The patient's wife attributes his abdominal pain to gas pains after eating. He denies any fever or vomiting. The patient recently got over a full body rash, which he attributes to a lidocaine cream he had been using. He was recently prescribed a new medication that is a muscle relaxer.     In the ER, he was noted to have K of 2.7, for which he received replacement.  He was afebrile with normal oxygen saturation and stale blood pressures.  Na is 135, K is 2.7, LFT's normal, CBC normal.  Dimer 0.69  US of RLE negative for DVT.      CT of chest shows no PE, + for air trapping, prox gastric wall thickening along with soft tissue mass posterior margin of proximal stomach, sclerotic changes T10-T12 with destructive changes at T12.  9 mm pulmonary nodule RUL, hypodense hepatic lobe lesion and possible splenic mets.    MRI of T spine : 1.  Diffuse infiltrative process of the bone marrow of the thoracic spine both anteriorly and posteriorly with extraosseous extension a diffuse involvement of the  neural foramen consistent with diffuse metastatic disease.  2. Extraosseous extension leads canal compromise and  neural foraminal narrowing from the T8 to the T10-T11 disc space level.  4.  No abnormal signal thoracic spinal cord.  3. Prominent involvement of the posterior ribs in the thoracic region with extension into the soft tissues.    MRI C spine and L spine showed no metastatic involvement         Past Medical History      HTN  HLP  [place] congential    Past Surgical History     Tonsillectomy at age 12    Prior to Admission Medications   None        Review of Systems    The 10 point Review of Systems is negative other than noted in the HPI or here. Weight loss 20 lbs.   No blood in stool.    Social History   -- no tobacco or etoh  --   -- has a small business       Family History   No significant family history, including no history of     Allergies   No Known Allergies     Physical Exam   Vital Signs: Temp: 97.3  F (36.3  C) Temp src: Temporal BP: 130/78 Pulse: 80   Resp: 18 SpO2: 97 % O2 Device: None (Room air)    Weight: 230 lbs 0 oz    General Appearance: NAD, [place]  Respiratory: CTA  Cardiovascular: RRR  GI: soft, NT, mildly distended  Skin: warm and dry, + RLE edema  Neuro: [place], non focal    Medical Decision Making     70 MINUTES SPENT BY ME on the date of service doing chart review, history, exam, documentation & further activities per the note.      Data     Results for orders placed or performed during the hospital encounter of 06/27/23 (from the past 24 hour(s))   CBC with platelets differential    Narrative    The following orders were created for panel order CBC with platelets differential.  Procedure                               Abnormality         Status                     ---------                               -----------         ------                     CBC with platelets and d...[[id number]]                      Final result                 Please view results for these tests on the  individual orders.   D dimer quantitative   Result Value Ref Range    D-Dimer Quantitative 0.69 (H) 0.00 - 0.50 ug/mL FEU    Narrative    This D-dimer assay is intended for use in conjunction with a clinical pretest probability assessment model to exclude pulmonary embolism (PE) and deep venous thrombosis (DVT) in outpatients suspected of PE or DVT. The cut-off value is 0.50 ug/mL FEU.   Comprehensive metabolic panel   Result Value Ref Range    Sodium 135 (L) 136 - 145 mmol/L    Potassium 2.7 (L) 3.4 - 5.3 mmol/L    Chloride 98 98 - 107 mmol/L    Carbon Dioxide (CO2) 24 22 - 29 mmol/L    Anion Gap 13 7 - 15 mmol/L    Urea Nitrogen 17.3 8.0 - 23.0 mg/dL    Creatinine 0.74 0.67 - 1.17 mg/dL    Calcium 8.9 8.8 - 10.2 mg/dL    Glucose 96 70 - 99 mg/dL    Alkaline Phosphatase 82 40 - 129 U/L    AST 19 0 - 45 U/L    ALT 18 0 - 70 U/L    Protein Total 6.7 6.4 - 8.3 g/dL    Albumin 4.0 3.5 - 5.2 g/dL    Bilirubin Total 0.3 <=1.2 mg/dL    GFR Estimate >90 >60 mL/min/1.73m2   Lipase   Result Value Ref Range    Lipase 21 13 - 60 U/L   Troponin T, High Sensitivity   Result Value Ref Range    Troponin T, High Sensitivity 7 <=22 ng/L   CBC with platelets and differential   Result Value Ref Range    WBC Count 7.9 4.0 - 11.0 10e3/uL    RBC Count 4.74 4.40 - 5.90 10e6/uL    Hemoglobin 13.8 13.3 - 17.7 g/dL    Hematocrit 40.5 40.0 - 53.0 %    MCV 85 78 - 100 fL    MCH 29.1 26.5 - 33.0 pg    MCHC 34.1 31.5 - 36.5 g/dL    RDW 14.6 10.0 - 15.0 %    Platelet Count 246 150 - 450 10e3/uL    % Neutrophils 59 %    % Lymphocytes 26 %    % Monocytes 11 %    % Eosinophils 3 %    % Basophils 1 %    % Immature Granulocytes 0 %    NRBCs per 100 WBC 0 <1 /100    Absolute Neutrophils 4.7 1.6 - 8.3 10e3/uL    Absolute Lymphocytes 2.0 0.8 - 5.3 10e3/uL    Absolute Monocytes 0.8 0.0 - 1.3 10e3/uL    Absolute Eosinophils 0.2 0.0 - 0.7 10e3/uL    Absolute Basophils 0.1 0.0 - 0.2 10e3/uL    Absolute Immature Granulocytes 0.0 <=0.4 10e3/uL    Absolute  NRBCs 0.0 10e3/uL   EKG 12-lead, tracing only   Result Value Ref Range    Systolic Blood Pressure  mmHg    Diastolic Blood Pressure  mmHg    Ventricular Rate 85 BPM    Atrial Rate 85 BPM    OR Interval 194 ms    QRS Duration 78 ms     ms    QTc 430 ms    P Axis -21 degrees    R AXIS -6 degrees    T Axis -17 degrees    Interpretation ECG       Sinus rhythm  Inferior infarct , age undetermined  Abnormal ECG  No previous ECGs available     US Lower Extremity Venous Duplex Right    Narrative    EXAM: US LOWER EXTREMITY VENOUS DUPLEX RIGHT  LOCATION: [facility]  DATE: 6/27/2023    INDICATION: Right lower extremity swelling.  COMPARISON: None available.  TECHNIQUE: Venous Duplex ultrasound of the right lower extremity with and without compression, augmentation and duplex. Color flow and spectral Doppler with waveform analysis performed.    FINDINGS: Exam includes the common femoral, femoral, popliteal, and contralateral common femoral veins as well as segmentally visualized deep calf veins and greater saphenous vein.     RIGHT: No deep vein thrombosis. No superficial thrombophlebitis.       Impression    IMPRESSION:    No deep venous thrombosis in the visualized right lower extremity.   UA with Microscopic reflex to Culture    Specimen: Urine, Midstream   Result Value Ref Range    Color Urine Light Yellow Colorless, Straw, Light Yellow, Yellow    Appearance Urine Clear Clear    Glucose Urine Negative Negative mg/dL    Bilirubin Urine Negative Negative    Ketones Urine Negative Negative mg/dL    Specific Gravity Urine 1.011 1.003 - 1.035    Blood Urine Negative Negative    pH Urine 6.0 5.0 - 7.0    Protein Albumin Urine Negative Negative mg/dL    Urobilinogen Urine Normal Normal, 2.0 mg/dL    Nitrite Urine Negative Negative    Leukocyte Esterase Urine Negative Negative    Mucus Urine Present (A) None Seen /LPF    RBC Urine 1 <=2 /HPF    WBC Urine <1 <=5 /HPF    Squamous Epithelials Urine <1  <=1 /HPF    Narrative    Urine Culture not indicated   CT Chest (PE) Abdomen Pelvis w Contrast    Narrative    EXAM: CT CHEST PE ABDOMEN PELVIS W CONTRAST  LOCATION: [facility]  DATE: 6/27/2023    INDICATION: Left flank pain, abdominal pain for months.  Elevated D dimer.  COMPARISON: None.  TECHNIQUE: CT chest pulmonary angiogram and routine CT abdomen pelvis with IV contrast. Arterial phase through the chest and venous phase through the abdomen and pelvis. Multiplanar reformats and MIP reconstructions were performed. Dose reduction   techniques were used.   CONTRAST: 115mL Isovue 370    FINDINGS:  ANGIOGRAM CHEST: Pulmonary arteries are normal caliber and negative for pulmonary emboli. Thoracic aorta is negative for dissection. No CT evidence of right heart strain.     LUNGS AND PLEURA: Elevated right hemidiaphragm. Mosaic attenuation pattern both lower lobes. No acute infiltrates or effusions. 9 mm pulmonary nodule right upper lobe on image 56 of series 6.    MEDIASTINUM/AXILLAE: Calcified mediastinal and left hilar lymph nodes. Soft tissue thickening within the posterior mediastinum surrounding the descending thoracic aorta and obscuring the retrocrural space.    CORONARY ARTERY CALCIFICATION: Moderate.    HEPATOBILIARY: Diffuse fatty infiltration of the liver. Subtle hypodense right hepatic lobe lesion measuring 3.1 x 1.6 cm on image 45 of series 11.    PANCREAS: Soft tissue thickening arising from or abutting the pancreatic tail and confluent with the adjacent gastric wall and medial aspect of the spleen. The head and body of the pancreas are normal. No pancreatic ductal dilatation.    SPLEEN: Calcified granulomas and several subtle hypodense splenic lesions.    ADRENAL GLANDS: Normal.    KIDNEYS/BLADDER: Normal.    BOWEL: Wall thickening involving the cardia and proximal and mid body of the stomach. Additional soft tissue prominence adjacent to the posterior gastric wall measuring 5.1  x 1.8 cm on image 51 of series 11, abutting the medial margin of the spleen and   the pancreatic tail. The small bowel and colon are unremarkable. No evidence for obstruction. Normal appendix.    LYMPH NODES: Enlarged left lobe along the posterior gastric wall measuring 1.7 x 1.3 cm image 57 of series 11.    VASCULATURE: Incidental retroaortic left renal vein.    PELVIC ORGANS: Prostatic hypertrophy. No pelvic ascites.    MUSCULOSKELETAL: Sclerotic changes and destructive changes of the T10 vertebral body. Additional sclerotic changes of the T10 and T11 vertebral bodies, with soft tissue thickening in the paravertebral soft tissues extending into the retroperitoneum and   surrounding the descending thoracic aorta as described above. Additional sclerotic vertebral bodies upper thoracic spine. Left inguinal hernia contains fat and a knuckle of nondilated proximal sigmoid colon.      Impression    IMPRESSION:  1.  No evidence for pulmonary emboli.  2.  Mosaic attenuation both lower lobes suggesting air trapping.  3.  No renal calculi or hydronephrosis.  4.  Proximal gastric wall thickening concerning for an infiltrative or neoplastic process. This includes a soft tissue mass along the posterior margin of the proximal stomach confluent with the adjacent spleen and pancreatic tail. GI consultation   recommended.  5.  Sclerotic changes involving the T10 -T12 vertebral bodies with destructive changes of T12 and abnormal surrounding paravertebral soft tissue extending into the posterior mediastinum. Findings also concerning for a neoplastic process. MRI of the spine   recommended for further evaluation.  6.  Indeterminate 9 mm pulmonary nodule right lung apex could represent a metastatic lesion.  7.  Hypodense right hepatic lobe lesion concerning for metastasis. Splenic metastasis also possible.  8.  Results discussed with Dr. Phil Hamm on 06/27/2023 at 9:35 PM.   Cervical spine MRI w/o contrast    Narrative    EXAM:  MR CERVICAL SPINE W/O CONTRAST  LOCATION: [facility]  DATE: 6/28/2023    INDICATION: Back pain with possible metastatic disease.  COMPARISON: None.  TECHNIQUE: MRI Cervical Spine without IV contrast.    FINDINGS:   There is good anatomic alignment of the cervical spine. The vertebral body heights and the disc space heights are well-maintained throughout and have appropriate signal characteristics for the patient's age. There is no evidence of bone marrow edema.   There is no abnormal signal or change in size of the cervical spinal cord. There is no evidence of an intracanal mass or hemorrhage. No extraspinal abnormality.    Craniovertebral junction and C1-C2: Normal.    C2-C3: Normal disc height. No herniation. Normal facets. No spinal canal or neural foraminal stenosis.     C3-C4: Normal disc height. No herniation. Normal facets. No spinal canal or neural foraminal stenosis.     C4-C5: Normal disc height. No herniation. Normal facets. No spinal canal or neural foraminal stenosis.     C5-C6: There is a mild loss of disc space height and signal. There is a shallow posterior disc osteophyte complex more prominent to the left posterior lateral region. There is no evidence of canal compromise. This along with the facet arthropathy and   uncinate spurring leads to moderate left neural foraminal narrowing.     C6-C7: There is a mild loss of disc space height and signal. There is a shallow right paracentral disc protrusion but no evidence of canal compromise. There is prominent left facet arthropathy leading to moderate left neural foraminal narrowing.     C7-T1: Normal disc height. No herniation. Normal facets. No spinal canal or neural foraminal stenosis.      Impression    IMPRESSION:  1.  Good anatomic alignment and vertebral body heights maintained.  2.  No abnormal signal cervical spinal cord.  3.  No evidence of canal compromise but neural foraminal narrowing as described above.      Thoracic spine MRI w/o contrast    Narrative    EXAM: MR THORACIC SPINE W/O CONTRAST  LOCATION: [facility]  DATE: 6/28/2023    INDICATION: metastatic disease, sclerotic lesions at T10 T12 on CT with back pain.  COMPARISON: 06/27/2023.  TECHNIQUE: Routine Thoracic Spine MRI without IV contrast.    FINDINGS:   There is good anatomic alignment of the thoracic spine. There is an infiltrative process with bone marrow edema seen involving the T1-T5 vertebral bodies and the T8-T10 vertebral bodies which involves both the anterior and posterior elements. There is   also probable slight involvement of the anterior superior corners of the T6 and T8 vertebral bodies. This is highly suggestive of metastatic disease. There is a pathologic compression fracture with a mild loss of height along the superior and inferior   endplates of the T10 vertebral body. There is also slight bulging of the posterior wall of the T3 vertebral body but no evidence of canal compromise at this level. There is diffuse involvement of the posterior ribs throughout the thoracic region with   extension into the adjacent soft tissues.    At the T3-T5 vertebral body levels, there is extra osseous extension into the surrounding soft tissues and the neural foramen and slight extension into the left side of the spinal canal at the T3 vertebral body level but there is no evidence of canal   compromise. This does lead to neural foraminal narrowing bilaterally at the levels.    Due to the diffuse involvement of the posterior ribs with extraosseous extension there is also probable bilateral neural foraminal narrowing at the T6 and T7 vertebral body levels.    At the T8 vertebral body level, there is extra osseous extension into the ventral and dorsal portion of the left side of the mild canal along with the neural foramen bilaterally. There is no evidence of canal compromise at the T8 level. There is further   extraosseous extension  into the spinal canal bilaterally from the T9-T10 to the T10-T11 disc space level.  This encases the thoracic spinal cord and leads to moderate canal compromise along with involvement of the neural foramen bilaterally.    There is no abnormal signal or change in size of the thoracic spinal cord.      Impression    IMPRESSION:  1.  Diffuse infiltrative process of the bone marrow of the thoracic spine both anteriorly and posteriorly with extraosseous extension a diffuse involvement of the neural foramen consistent with diffuse metastatic disease.  2. Extraosseous extension leads canal compromise and  neural foraminal narrowing from the T8 to the T10-T11 disc space level.  4.  No abnormal signal thoracic spinal cord.  3. Prominent involvement of the posterior ribs in the thoracic region with extension into the soft tissues.   Lumbar spine MRI w/o contrast    Narrative    EXAM: MR LUMBAR SPINE W/O CONTRAST  LOCATION: [facility]  DATE: 6/28/2023    INDICATION: Low back pain and possible metastatic disease.  COMPARISON: None.  TECHNIQUE: Routine Lumbar Spine MRI without IV contrast.    FINDINGS:   Nomenclature is based on 5 lumbar type vertebral bodies. There is good anatomic alignment. The vertebral body heights are well-maintained throughout and have appropriate signal characteristics for the patient's age. There is no evidence of bone marrow   edema. Normal distal spinal cord and cauda equina with conus medullaris at T12.. No extraspinal abnormality. Unremarkable visualized bony pelvis.    T12-L1: Normal disc height and signal. No herniation. Normal facets. No spinal canal or neural foraminal stenosis.     L1-L2: Normal disc height and signal. No herniation. Normal facets. No spinal canal or neural foraminal stenosis.    L2-L3: Normal disc height and signal. No herniation. Normal facets. No spinal canal or neural foraminal stenosis.     L3-L4: There is a slight loss of disc space height and  signal. There is a diffuse annular bulge more prominent to the posterior lateral region along with a shallow right paracentral disc protrusion. This along with the facet arthropathy leads to mild to   moderate canal compromise, mild right lateral recess narrowing and mild bilateral neural foraminal narrowing.    L4-L5: There is a mild loss of disc space height and signal. There is a mild diffuse annular bulge more prominent to the right posterior lateral region but there is no evidence of canal compromise. There is facet arthropathy leading to right lateral   recess narrowing and minimal right neural foraminal narrowing.    L5-S1: There is a normal disc space height and signal. There is a broad central disc protrusion but no evidence of canal compromise. There is facet arthropathy but the lateral recesses and the neural foramen are patent bilaterally.      Impression    IMPRESSION:  1.  Good anatomic alignment and vertebral. Maintained and no evidence of bone marrow edema.  2.  Degenerative disc disease with canal compromise and neural foraminal narrowing from the L3-L4 to the L5-S1 disc space level as described above.

## 2023-06-28 NOTE — PLAN OF CARE
Admitted from ED at approximately 0630.A/O x4, C/o pain in back and bilateral ribs only relieved with standing, PRN IV dilaudid given x1 with little to no relief. Denies N/V/ SOB. Right leg swelling with some numbness. Up A1/gbw. NPO for EGD and EUS procedure per MD note. Discharge plan pending

## 2023-06-28 NOTE — ED PROVIDER NOTES
"Emergency Department Attending Supervision Note  6/27/2023  9:53 PM      I evaluated this patient in conjunction with Lara Klein PA-C      Briefly, the  Patient presented with right lower extremity swelling as well as left flank pain.  They are visiting from [facility] getting ready for a trip to [place].  He denies shortness of breath.  No history of blood clot.  He states he has had abdominal pain for approximately 6 months and has had an ultrasound and lab work which was unremarkable.      On my exam:  /78   Pulse 80   Temp 97.3  F (36.3  C) (Temporal)   Resp 18   Ht 1.803 m (5' 11\")   Wt 104.3 kg (230 lb)   SpO2 97%   BMI 32.08 kg/m     General: Alert interactive  HEENT: No scleral icterus, external ears and nose are normal, mucous membranes are moist  Cardiovascular: Regular rate and rhythm  Lungs: Clear to auscultation  Abdomen: Soft nontender  Musculoskeletal: Slight edema to the right lower extremity    Results:  Labs Ordered and Resulted from Time of ED Arrival to Time of ED Departure   D DIMER QUANTITATIVE - Abnormal       Result Value    D-Dimer Quantitative 0.69 (*)    COMPREHENSIVE METABOLIC PANEL - Abnormal    Sodium 135 (*)     Potassium 2.7 (*)     Chloride 98      Carbon Dioxide (CO2) 24      Anion Gap 13      Urea Nitrogen 17.3      Creatinine 0.74      Calcium 8.9      Glucose 96      Alkaline Phosphatase 82      AST 19      ALT 18      Protein Total 6.7      Albumin 4.0      Bilirubin Total 0.3      GFR Estimate >90     ROUTINE UA WITH MICROSCOPIC REFLEX TO CULTURE - Abnormal    Color Urine Light Yellow      Appearance Urine Clear      Glucose Urine Negative      Bilirubin Urine Negative      Ketones Urine Negative      Specific Gravity Urine 1.011      Blood Urine Negative      pH Urine 6.0      Protein Albumin Urine Negative      Urobilinogen Urine Normal      Nitrite Urine Negative      Leukocyte Esterase Urine Negative      Mucus Urine Present (*)     RBC Urine " 1      WBC Urine <1      Squamous Epithelials Urine <1     LIPASE - Normal    Lipase 21     TROPONIN T, HIGH SENSITIVITY - Normal    Troponin T, High Sensitivity 7     CBC WITH PLATELETS AND DIFFERENTIAL    WBC Count 7.9      RBC Count 4.74      Hemoglobin 13.8      Hematocrit 40.5      MCV 85      MCH 29.1      MCHC 34.1      RDW 14.6      Platelet Count 246      % Neutrophils 59      % Lymphocytes 26      % Monocytes 11      % Eosinophils 3      % Basophils 1      % Immature Granulocytes 0      NRBCs per 100 WBC 0      Absolute Neutrophils 4.7      Absolute Lymphocytes 2.0      Absolute Monocytes 0.8      Absolute Eosinophils 0.2      Absolute Basophils 0.1      Absolute Immature Granulocytes 0.0      Absolute NRBCs 0.0         CT Chest (PE) Abdomen Pelvis w Contrast   Final Result   IMPRESSION:   1.  No evidence for pulmonary emboli.   2.  Mosaic attenuation both lower lobes suggesting air trapping.   3.  No renal calculi or hydronephrosis.   4.  Proximal gastric wall thickening concerning for an infiltrative or neoplastic process. This includes a soft tissue mass along the posterior margin of the proximal stomach confluent with the adjacent spleen and pancreatic tail. GI consultation    recommended.   5.  Sclerotic changes involving the T10 -T12 vertebral bodies with destructive changes of T12 and abnormal surrounding paravertebral soft tissue extending into the posterior mediastinum. Findings also concerning for a neoplastic process. MRI of the spine    recommended for further evaluation.   6.  Indeterminate 9 mm pulmonary nodule right lung apex could represent a metastatic lesion.   7.  Hypodense right hepatic lobe lesion concerning for metastasis. Splenic metastasis also possible.   8.  Results discussed with Dr. Phil Hamm on 06/27/2023 at 9:35 PM.      US Lower Extremity Venous Duplex Right   Final Result   IMPRESSION:      No deep venous thrombosis in the visualized right lower extremity.       Cervical spine MRI w/o contrast    (Results Pending)   Thoracic spine MRI w/o contrast    (Results Pending)   Lumbar spine MRI w/o contrast    (Results Pending)       ED course:  Follow-up presentation history and physical examination were performed unfortunately mass is noted in the gastric region concerning for malignancy with subsequent spinal metastases.  We relayed this information to the patient and I recommended hospitalization, he was in agreement.  He remained hemodynamically stable throughout his stay in the emergency department.      Diagnosis    ICD-10-CM    1. Right leg swelling  M79.89       2. Metastatic malignant neoplasm, unspecified site (H)  C79.9       3. Hypokalemia  E87.6                  Trigger, Phil Cordoba MD  06/28/23 0148

## 2023-06-28 NOTE — CONSULTS
Suspect neoplastic disease with mets  Patient has infiltrative abnormality of the stomach along with a soft tissue mass along with evidence of mets  NPO  Plan for EGD and EUS today    Emory Gaston MD FACP

## 2023-06-28 NOTE — PHARMACY-ADMISSION MEDICATION HISTORY
Pharmacy Intern Admission Medication History    Admission medication history is complete. The information provided in this note is only as accurate as the sources available at the time of the update.    Medication reconciliation/reorder completed by provider prior to medication history? No    Information Source(s): Patient, Family member and CareEverywhere/SureScripts via in-person    Pertinent Information: Pt didn't take his valacyclovir as directed     Changes made to PTA medication list:    Added: Updated his medication per patient and SureScripts    Deleted: None    Changed: None    Medication Affordability:  Not including over the counter (OTC) medications, was there a time in the past 3 months when you did not take your medications as prescribed because of cost?: Unable to Assess    Allergies reviewed with patient and updates made in EHR: yes    Medication History Completed By: Geneva Dexter 6/27/2023 10:51 PM    Prior to Admission medications    Medication Sig Last Dose Taking? Auth Provider Long Term End Date   aspirin (ASA) 325 MG EC tablet Take 325 mg by mouth At Bedtime 6/26/2023 at pm Yes Unknown, Entered By History     atorvastatin (LIPITOR) 10 MG tablet Take 10 mg by mouth daily 6/27/2023 at am Yes Unknown, Entered By History Yes    cyclobenzaprine (FLEXERIL) 5 MG tablet Take 5-10 mg by mouth every 8 hours as needed (back pain) prn Yes Unknown, Entered By History     fish oil-omega-3 fatty acids 1000 MG capsule Take 2 g by mouth every morning 6/27/2023 at am Yes Unknown, Entered By History     valsartan-hydrochlorothiazide (DIOVAN HCT) 320-25 MG tablet Take 1 tablet by mouth daily 6/27/2023 at am Yes Unknown, Entered By History Yes

## 2023-06-28 NOTE — UTILIZATION REVIEW
Admission Status; Secondary Review Determination       Under the authority of the Utilization Management Committee, the utilization review process indicated a secondary review on the above patient. The review outcome is based on review of the medical records, discussions with staff, and applying clinical experience noted on the date of the review.     (x) Inpatient Status Appropriate - This patient's medical care is consistent with medical management for inpatient care and reasonable inpatient medical practice.     RATIONALE FOR DETERMINATION     Patient requires inpatient admission versus short stay observation or outpatient treatment for the following reasons:  67 year old male with pmh of htn, hld who presents with right leg swelling and back pain with concern for new metastatic cancer. CT scan reveals a mass of the proximal stomach involving the adjacent spleen and the pancreatic tail, sclerotic changes of the the T10-12 vertebral bodies with destructive changes. 9 mm lung nodule. EGD and EUS is being planned for today. Agree with inpatient status with expectation of > 2 midnights to workup up mass and start care plan for newly diagnosed metastatic cancer.     The expected length of stay at the time of admission was more than 2 nights because of the severity of illness, intensity of service provided, and risk for adverse outcome. Inpatient admission is appropriate.         This document was produced using voice recognition software       The information on this document is developed by the utilization review team in order for the business office to ensure compliance. This only denotes the appropriateness of proper admission status and does not reflect the quality of care rendered.   The definitions of Inpatient Status and Observation Status used in making the determination above are those provided in the CMS Coverage Manual, Chapter 1 and Chapter 6, section 70.4.   Sincerely,   Teddy Klein  DO  Physician Advisor  [facility].

## 2023-06-28 NOTE — PLAN OF CARE
Goal Outcome Evaluation:    AOx4. VSS on RA. [place]. Up A2 GB/W or sarasteady. Pt had assisted fall while going to bathroom, see NP note. Tolerating clear liquid diet. PRN dilaudid and PRN oxy given for 5/10 abd and back pain, effective. L PIV infusing LR @ 75ml/hr. Straight cath for 900 ml, MD notified and ordered for bladder scan q 6 hrs and straight cath for >300 ml. BLE numbness and tingling. RLE with +1-2 edema, R thigh CT done - see results. EGD and EUS done today - see results. HemOnc and GI following, PT, OT, and Radiation Oncology consulted. Family at bedside. Discharge pending workup.

## 2023-06-28 NOTE — ED PROVIDER NOTES
"  History     Chief Complaint:  Leg Swelling       The history is provided by the patient and the spouse. No  was used.      Enrique Dumas is a 67 year old male with a history of hypertension and hyperlipidemia who presents with right leg swelling and numbness. The patient reports that at 0000 this morning, his right leg began feeling swollen and numb. Patient also reports several other symptoms including bilateral pain near his ribs and intermittent abdominal pain following meals. These symptoms have been persistent for the past month. The patient states he cannot lay on his back because he has back pain. He was seen by primary care who gave him a muscle relaxant and lidocaine patches. Patient states he developed a fully body rash after lidocaine patches and suspects he may be allergic He denies any fever, chills, or vomiting. Patient notes he recently has a abdominal US for his gall bladder that was normal.    Independent Historian:   Spouse/Partner - They report information as given above    Review of External Notes:    06/20/2023     Medications:    Aspirin 325 mg   Lipitor   Diovan   Kenalog   Flexeril     Past Medical History:    Trigger finger, left hand   Bilateral deafness   Hypertension   Hyperlipidemia     Physical Exam     Patient Vitals for the past 24 hrs:   BP Temp Temp src Pulse Resp SpO2 Height Weight   06/27/23 1957 130/78 -- -- 80 18 97 % -- --   06/27/23 1845 120/77 97.3  F (36.3  C) Temporal 76 20 96 % 1.803 m (5' 11\") 104.3 kg (230 lb)      Physical Exam  Constitutional: Alert, attentive, GCS 15  HENT:    Nose: Nose normal.    Mouth/Throat: Oropharynx is clear, mucous membranes are moist   Eyes: EOM are normal.   CV: regular rate and rhythm; no murmurs, rubs or gallups  Chest: Effort normal and breath sounds normal.   GI:  There is no tenderness. No distension. Normal bowel sounds  : No CVA tenderness  MSK: Normal range of motion of right ankle and calf. Negative " Soniya's. Dorsalis pedis pulse intact with brisk capillary refill of toes. Subjective numbness to lateral right calf.  Neurological: Alert, attentive, Oriented x 3. No facial asymmetry. CN II-XII intact. 5/5 strength in upper and lower extremities. Normal range of motion in all four extremities. Distal sensation in hands and feet intact. Normal gait.  Skin: Skin is warm and dry. No observed rash.    Emergency Department Course   ECG  ECG results from 06/27/23   EKG 12-lead, tracing only     Value    Systolic Blood Pressure     Diastolic Blood Pressure     Ventricular Rate 85    Atrial Rate 85    MS Interval 194    QRS Duration 78        QTc 430    P Axis -21    R AXIS -6    T Axis -17    Interpretation ECG      Sinus rhythm  Inferior infarct , age undetermined  Abnormal ECG  No previous ECGs available       Imaging:  CT Chest (PE) Abdomen Pelvis w Contrast   Final Result   IMPRESSION:   1.  No evidence for pulmonary emboli.   2.  Mosaic attenuation both lower lobes suggesting air trapping.   3.  No renal calculi or hydronephrosis.   4.  Proximal gastric wall thickening concerning for an infiltrative or neoplastic process. This includes a soft tissue mass along the posterior margin of the proximal stomach confluent with the adjacent spleen and pancreatic tail. GI consultation    recommended.   5.  Sclerotic changes involving the T10 -T12 vertebral bodies with destructive changes of T12 and abnormal surrounding paravertebral soft tissue extending into the posterior mediastinum. Findings also concerning for a neoplastic process. MRI of the spine    recommended for further evaluation.   6.  Indeterminate 9 mm pulmonary nodule right lung apex could represent a metastatic lesion.   7.  Hypodense right hepatic lobe lesion concerning for metastasis. Splenic metastasis also possible.   8.  Results discussed with Dr. Phil Hamm on 06/27/2023 at 9:35 PM.      US Lower Extremity Venous Duplex Right   Final Result    IMPRESSION:      No deep venous thrombosis in the visualized right lower extremity.      Cervical spine MRI w/o contrast    (Results Pending)   Thoracic spine MRI w/o contrast    (Results Pending)   Lumbar spine MRI w/o contrast    (Results Pending)      Report per radiology    Laboratory:  Labs Ordered and Resulted from Time of ED Arrival to Time of ED Departure   D DIMER QUANTITATIVE - Abnormal       Result Value    D-Dimer Quantitative 0.69 (*)    COMPREHENSIVE METABOLIC PANEL - Abnormal    Sodium 135 (*)     Potassium 2.7 (*)     Chloride 98      Carbon Dioxide (CO2) 24      Anion Gap 13      Urea Nitrogen 17.3      Creatinine 0.74      Calcium 8.9      Glucose 96      Alkaline Phosphatase 82      AST 19      ALT 18      Protein Total 6.7      Albumin 4.0      Bilirubin Total 0.3      GFR Estimate >90     ROUTINE UA WITH MICROSCOPIC REFLEX TO CULTURE - Abnormal    Color Urine Light Yellow      Appearance Urine Clear      Glucose Urine Negative      Bilirubin Urine Negative      Ketones Urine Negative      Specific Gravity Urine 1.011      Blood Urine Negative      pH Urine 6.0      Protein Albumin Urine Negative      Urobilinogen Urine Normal      Nitrite Urine Negative      Leukocyte Esterase Urine Negative      Mucus Urine Present (*)     RBC Urine 1      WBC Urine <1      Squamous Epithelials Urine <1     LIPASE - Normal    Lipase 21     TROPONIN T, HIGH SENSITIVITY - Normal    Troponin T, High Sensitivity 7     CBC WITH PLATELETS AND DIFFERENTIAL    WBC Count 7.9      RBC Count 4.74      Hemoglobin 13.8      Hematocrit 40.5      MCV 85      MCH 29.1      MCHC 34.1      RDW 14.6      Platelet Count 246      % Neutrophils 59      % Lymphocytes 26      % Monocytes 11      % Eosinophils 3      % Basophils 1      % Immature Granulocytes 0      NRBCs per 100 WBC 0      Absolute Neutrophils 4.7      Absolute Lymphocytes 2.0      Absolute Monocytes 0.8      Absolute Eosinophils 0.2      Absolute Basophils 0.1       Absolute Immature Granulocytes 0.0      Absolute NRBCs 0.0        Procedures   None     Emergency Department Course & Assessments:     Interventions:  Medications   iopamidol (ISOVUE-370) solution 115 mL (115 mLs Intravenous $Given 6/27/23 2034)   Saline (100 mLs Intravenous $Given 6/27/23 2034)   potassium chloride ER (KLOR-CON M) CR tablet 40 mEq (40 mEq Oral $Given 6/27/23 2112)      Independent Interpretation (X-rays, CTs, rhythm strip):  none    Assessments/Consultations/Discussion of Management or Tests:  ED Course as of 06/28/23 0021 Tue Jun 27, 2023 2048 I obtained the patient's history.    2134 I rechecked the patient.    2218 I consulted with Dr. Ortega, hospitalist, regarding the patient's admission to the hospital.      Social Determinants of Health affecting care:   None    Disposition:  The patient was admitted to the hospital under the care of Dr. Ortega.     Impression & Plan    Medical Decision Making:  Patient is a pleasant 67-year-old male who presents with right leg swelling and numbness that started abruptly at 000 last night and several other complaints including postprandial abdominal pain, and back pain that is worse at night.  Vitals are appropriate.  Physical examination reveals no significant right leg swelling.  Right lower extremity is neurovascularly intact.  Patient was initially evaluated in triage where preliminary labs and imaging was obtained.  Patient is hypokalemic with potassium at 2.7 today.  He was given both oral and IV potassium while waiting for imaging results.  D-dimer was found to be elevated at 0.69.  Ultrasound of the right lower extremity shows no DVT.  Patient also complained of postprandial abdominal pain and so CT PE and abdominal study is obtained.  Patient staffed with Dr. Hamm who received call from [facility] concerning CT findings.  There appears to be a stomach wall thickening/mass with extensive metastatic disease including splenic, pancreatic  tail, and hepatic lesions.  There is scleral changes to T10 and T12 vertebral bodies suggestive of neoplastic process and part of this process extends into the posterior mediastinum.  Pulmonary nodule also identified.  Results reviewed.  Both Dr. Hamm and STEVE discussed findings with patient and family at bedside.  Patient will require further management including GI consultation and likely oncology consultation.  Complete MRI of the spine also ordered considering that there is likely metastatic process within the vertebral bodies. Patient accepted by Dr. Ortega for inpatient admission.  Patient aware of and agreeable to inpatient admission.  He remains hemodynamically stable.    Diagnosis:    ICD-10-CM    1. Right leg swelling  M79.89       2. Metastatic malignant neoplasm, unspecified site (H)  C79.9       3. Hypokalemia  E87.6          Discharge Medications:  New Prescriptions    No medications on file      Scribe Disclosure:  IOneyda, am serving as a scribe at 8:41 PM on 6/27/2023 to document services personally performed by Lara Klein PA-C based on my observations and the provider's statements to me.   6/27/2023   Lara Klein PA-C [place]ck, Emily, PA-C  06/28/23 0022

## 2023-06-28 NOTE — PROGRESS NOTES
Patient seen. Full consult to follow. 67 year old male with gastric mass, bone lesions, liver lesion, splenic lesions and lung nodule. This is highly suspicious for metastatic gastric cancer.  -Await pathology.  -Start dexamethasone.  -Radiation oncology consult.

## 2023-06-28 NOTE — PROGRESS NOTES
Brief House NP Note  6/28/2023  1155    Called by RN after pt had an assisted fall to the ground. Pt has unsteady gait w/ frequent falls due to known mets to spine/back. Pt was ambulating w/ gait belt and walker and nursing staff when his legs became weak. He notified nursing and they gently assisted him to the ground. He knelt on the ground and was easily assisted back to a chair.     I examined the patient while in bed. He is sitting upright, talking w/ family. He denies any pain, discomfort. I examined knees. Active ROM w/o pain, gait at baseline, able to bear weight wihtout pain. Skin intact, no bruising.     Not all injuries from a fall are obvious on initial exam, please to not hesitate to call for reassessment by House provider should the patient's clinical status change, report new pain, or patient/nursing concern.         CHELLE Bobby [facility]  Hospitalist Service  House Officer  Pager: [phone] (7a - 6p)

## 2023-06-28 NOTE — ED NOTES
[facility]  ED Nurse Handoff Report    ED Chief complaint: Leg Swelling      ED Diagnosis:   Final diagnoses:   Right leg swelling   Metastatic malignant neoplasm, unspecified site (H)   Hypokalemia       Code Status: Admitting MD to discuss    Allergies: No Known Allergies    Patient Story: Pt arrives via triage presenting with right leg swelling numbness that began around 0000. Pt also reports pain in his ribs following meals, back pain, and abdominal pain for weeks. Lesions were discovered on CT.     Focused Assessment:  A & Ox4. Back pain and abdominal pain. 18g Rt AC    Labs Ordered and Resulted from Time of ED Arrival to Time of ED Departure   D DIMER QUANTITATIVE - Abnormal       Result Value    D-Dimer Quantitative 0.69 (*)    COMPREHENSIVE METABOLIC PANEL - Abnormal    Sodium 135 (*)     Potassium 2.7 (*)     Chloride 98      Carbon Dioxide (CO2) 24      Anion Gap 13      Urea Nitrogen 17.3      Creatinine 0.74      Calcium 8.9      Glucose 96      Alkaline Phosphatase 82      AST 19      ALT 18      Protein Total 6.7      Albumin 4.0      Bilirubin Total 0.3      GFR Estimate >90     ROUTINE UA WITH MICROSCOPIC REFLEX TO CULTURE - Abnormal    Color Urine Light Yellow      Appearance Urine Clear      Glucose Urine Negative      Bilirubin Urine Negative      Ketones Urine Negative      Specific Gravity Urine 1.011      Blood Urine Negative      pH Urine 6.0      Protein Albumin Urine Negative      Urobilinogen Urine Normal      Nitrite Urine Negative      Leukocyte Esterase Urine Negative      Mucus Urine Present (*)     RBC Urine 1      WBC Urine <1      Squamous Epithelials Urine <1     LIPASE - Normal    Lipase 21     TROPONIN T, HIGH SENSITIVITY - Normal    Troponin T, High Sensitivity 7     CBC WITH PLATELETS AND DIFFERENTIAL    WBC Count 7.9      RBC Count 4.74      Hemoglobin 13.8      Hematocrit 40.5      MCV 85      MCH 29.1      MCHC 34.1      RDW 14.6      Platelet Count 246       % Neutrophils 59      % Lymphocytes 26      % Monocytes 11      % Eosinophils 3      % Basophils 1      % Immature Granulocytes 0      NRBCs per 100 WBC 0      Absolute Neutrophils 4.7      Absolute Lymphocytes 2.0      Absolute Monocytes 0.8      Absolute Eosinophils 0.2      Absolute Basophils 0.1      Absolute Immature Granulocytes 0.0      Absolute NRBCs 0.0       CT Chest (PE) Abdomen Pelvis w Contrast   Final Result   IMPRESSION:   1.  No evidence for pulmonary emboli.   2.  Mosaic attenuation both lower lobes suggesting air trapping.   3.  No renal calculi or hydronephrosis.   4.  Proximal gastric wall thickening concerning for an infiltrative or neoplastic process. This includes a soft tissue mass along the posterior margin of the proximal stomach confluent with the adjacent spleen and pancreatic tail. GI consultation    recommended.   5.  Sclerotic changes involving the T10 -T12 vertebral bodies with destructive changes of T12 and abnormal surrounding paravertebral soft tissue extending into the posterior mediastinum. Findings also concerning for a neoplastic process. MRI of the spine    recommended for further evaluation.   6.  Indeterminate 9 mm pulmonary nodule right lung apex could represent a metastatic lesion.   7.  Hypodense right hepatic lobe lesion concerning for metastasis. Splenic metastasis also possible.   8.  Results discussed with Dr. Phil Hamm on 06/27/2023 at 9:35 PM.      US Lower Extremity Venous Duplex Right   Final Result   IMPRESSION:      No deep venous thrombosis in the visualized right lower extremity.      Cervical spine MRI w/o contrast    (Results Pending)   Thoracic spine MRI w/o contrast    (Results Pending)   Lumbar spine MRI w/o contrast    (Results Pending)         Does this patient have any cognitive concerns?: none    Activity level - Baseline/Home:  Independent  Activity Level - Current:   Independent    Patient's Preferred language: English   Needed?:  "No    Isolation: None  Infection: Not Applicable  Patient tested for COVID 19 prior to admission: NO  Bariatric?: No    Vital Signs:   Vitals:    06/27/23 1845 06/27/23 1957   BP: 120/77 130/78   Pulse: 76 80   Resp: 20 18   Temp: 97.3  F (36.3  C)    TempSrc: Temporal    SpO2: 96% 97%   Weight: 104.3 kg (230 lb)    Height: 1.803 m (5' 11\")        Cardiac Rhythm:     Was the PSS-3 completed:   Yes  What interventions are required if any?               Family Comments: Wife present at bedside- very emotional with new diagnosis.   OBS brochure/video discussed/provided to patient/family: No           For the majority of the shift this patient's behavior was Green.   Behavioral interventions performed were Frequent rounding.    ED NURSE PHONE NUMBER: *[phone]         "

## 2023-06-28 NOTE — PROGRESS NOTES
SPIRITUAL HEALTH SERVICES  SPIRITUAL ASSESSMENT Progress Note  [facility] MedSurg/Oncology 88    REFERRAL SOURCE: Request at admission for spiritual care    Brief visit with pt Enrique's spouse. She was trying to rest while Enrique was at endoscopy. Encouraged resting and self-care.     We agreed a  would return at another time.    PLAN: Will refer to tomorrow's on-call  for follow-up. Please contact Spiritual Health as needs arise.    Nga Stafford  Associate   [facility] Phone Line: [phone]  [facility] Pager: [phone]

## 2023-06-28 NOTE — CONSULTS
[facility]  Gastroenterology Consultation         Enrique Dumas  [address]  [place] [address]  67 year old male    Admission Date/Time: 6/27/2023  Primary Care Provider: Lori Hallman  Referring / Attending Physician:  Dr. Terry Ortega    We were asked to see the patient in consultation by Dr. Terry Ortega for evaluation of gastric mass.      CC: back pain, swollen leg and weight loss    HPI:  Enrique Dumas is a 67 year old male who has a past medical history of congenital hard of hearing, hypertension and hyperlipidemia who presents with right leg swelling, back pain and abdominal pain that started in January 2023. Patient is from [place] and are in the [place] [facility] [facility] about to go on an [employer] cruise today and noted leg became significantly swollen.     He reports his leg was swollen and numb and has difficulty laying flat. He has had burning abdominal pain and associated with bloating and gas. He has had decreased appetite with mild nausea and no vomiting. He was seeing a chiropractor for pain and was being treated with Salonpas patches and developed a rash that was evaluated by PCP and there initially was concern for herpes zoster however, rash had become systemic and some concern related to Salonpas. He reports he has had a 20 pound weight loss. Thought pain was musculoskeletal from herniated disk or muscle strain that started in January with shoveling snow.      ROS: A comprehensive ten point review of systems was negative aside from those in mentioned in the HPI.      PAST MED HX:  I have reviewed this patient's medical history and updated it with pertinent information if needed.   History reviewed. No pertinent past medical history.    MEDICATIONS: Prior to Admission Medications   Prescriptions Last Dose Informant Patient Reported? Taking?   aspirin (ASA) 325 MG EC tablet 6/26/2023 at pm  Yes Yes   Sig: Take 325 mg by mouth At Bedtime   atorvastatin  (LIPITOR) 10 MG tablet 6/27/2023 at am  Yes Yes   Sig: Take 10 mg by mouth daily   cyclobenzaprine (FLEXERIL) 5 MG tablet prn  Yes Yes   Sig: Take 5-10 mg by mouth every 8 hours as needed (back pain)   fish oil-omega-3 fatty acids 1000 MG capsule 6/27/2023 at am  Yes Yes   Sig: Take 2 g by mouth every morning   valsartan-hydrochlorothiazide (DIOVAN HCT) 320-25 MG tablet 6/27/2023 at am  Yes Yes   Sig: Take 1 tablet by mouth daily      Facility-Administered Medications: None       ALLERGIES: No Known Allergies    SOCIAL HISTORY:       FAMILY HISTORY:  History reviewed. No pertinent family history.    PHYSICAL EXAM:   General  Alert, oriented and comfortable  Vital Signs with Ranges  Temp: 98.1  F (36.7  C) Temp src: Oral BP: 120/83 Pulse: 84   Resp: 18 SpO2: 99 % O2 Device: None (Room air)    No intake/output data recorded.    Constitutional: healthy, alert and no distress   Cardiovascular: negative, PMI normal. No lifts, heaves, or thrills. RRR. No murmurs, clicks gallops or rub  Respiratory: negative, Percussion normal. Good diaphragmatic excursion. Lungs clear  Abdomen: Abdomen soft, bilateral upper quadrants tender. BS normal. No masses, organomegaly            ADDITIONAL COMMENTS:   I reviewed the patient's new clinical lab test results.   Recent Labs   Lab Test 06/28/23  0816 06/27/23  1856   WBC 7.0 7.9   HGB 14.7 13.8   MCV 87 85    246     Recent Labs   Lab Test 06/27/23  1856   POTASSIUM 2.7*   CHLORIDE 98   CO2 24   BUN 17.3   ANIONGAP 13     Recent Labs   Lab Test 06/27/23 2001 06/27/23  1856   ALBUMIN  --  4.0   BILITOTAL  --  0.3   ALT  --  18   AST  --  19   PROTEIN Negative  --    LIPASE  --  21       I reviewed the patient's new imaging results.        CONSULTATION ASSESSMENT AND PLAN:    Enrique Dumas is a 67 year old male with congenital [place], history of hypertension and hyperlipidemia who presents with right leg swelling. The patient has been having back pain since the beginning of this  year.  He has a 20 lbs weight loss.  He has some RLE swelling and came in and found to have abnormal imaging studies and concern for metastatic disease.    Gastric mass  Metastatic malignant neoplasm, unspecified site (H)  - presented with 20 pound weight loss, back pain for 6 months  - 6/26 RUQ ultrasound unremarkable  -6/27 CT of chest noted prox gastric wall thickening along with soft tissue mass posterior margin of proximal stomach, sclerotic changes T10-T12 with destructive changes at T12.  9 mm pulmonary nodule RUL, hypodense hepatic lobe lesion and possible splenic mets.  - 6/27 MRI of cervical, thoracic and lumbar spine- Diffuse infiltrative process of the bone marrow of the thoracic spine both anteriorly and posteriorly with extraosseous extension a diffuse involvement of the neural foramen consistent with diffuse metastatic disease.  -Labs essentially unremarkable  - Findings quite concerning for primary gastric mass with mets to spleen, liver, and thoracic spine as well as lung.    -- EGD/EUS recommended with biopsies and determine size of mass and any evidence of regional lymphadenopathy or visible local metastases  -- NPO  -- appreciate that Heme/Onc consulted  -- start daily PPI        [facility].  Office: [phone]  Cell : [phone] (Dr. Gaston)  Cell: [phone] (Amelia Bunch PA-C)

## 2023-06-29 ENCOUNTER — APPOINTMENT (OUTPATIENT)
Dept: OCCUPATIONAL THERAPY | Facility: CLINIC | Age: 68
DRG: 824 | End: 2023-06-29
Attending: INTERNAL MEDICINE
Payer: MEDICARE

## 2023-06-29 ENCOUNTER — APPOINTMENT (OUTPATIENT)
Dept: PHYSICAL THERAPY | Facility: CLINIC | Age: 68
DRG: 824 | End: 2023-06-29
Attending: INTERNAL MEDICINE
Payer: MEDICARE

## 2023-06-29 LAB
ANION GAP SERPL CALCULATED.3IONS-SCNC: 15 MMOL/L (ref 7–15)
BASOPHILS # BLD AUTO: 0 10E3/UL (ref 0–0.2)
BASOPHILS NFR BLD AUTO: 0 %
BUN SERPL-MCNC: 14.1 MG/DL (ref 8–23)
CALCIUM SERPL-MCNC: 9.9 MG/DL (ref 8.8–10.2)
CHLORIDE SERPL-SCNC: 96 MMOL/L (ref 98–107)
CREAT SERPL-MCNC: 0.65 MG/DL (ref 0.67–1.17)
DEPRECATED HCO3 PLAS-SCNC: 23 MMOL/L (ref 22–29)
EOSINOPHIL # BLD AUTO: 0 10E3/UL (ref 0–0.7)
EOSINOPHIL NFR BLD AUTO: 0 %
ERYTHROCYTE [DISTWIDTH] IN BLOOD BY AUTOMATED COUNT: 14.6 % (ref 10–15)
GFR SERPL CREATININE-BSD FRML MDRD: >90 ML/MIN/1.73M2
GLUCOSE SERPL-MCNC: 122 MG/DL (ref 70–99)
HCT VFR BLD AUTO: 45 % (ref 40–53)
HGB BLD-MCNC: 15.3 G/DL (ref 13.3–17.7)
IMM GRANULOCYTES # BLD: 0 10E3/UL
IMM GRANULOCYTES NFR BLD: 0 %
LYMPHOCYTES # BLD AUTO: 0.7 10E3/UL (ref 0.8–5.3)
LYMPHOCYTES NFR BLD AUTO: 10 %
MCH RBC QN AUTO: 29.7 PG (ref 26.5–33)
MCHC RBC AUTO-ENTMCNC: 34 G/DL (ref 31.5–36.5)
MCV RBC AUTO: 87 FL (ref 78–100)
MONOCYTES # BLD AUTO: 0.2 10E3/UL (ref 0–1.3)
MONOCYTES NFR BLD AUTO: 4 %
NEUTROPHILS # BLD AUTO: 5.9 10E3/UL (ref 1.6–8.3)
NEUTROPHILS NFR BLD AUTO: 86 %
NRBC # BLD AUTO: 0 10E3/UL
NRBC BLD AUTO-RTO: 0 /100
PLATELET # BLD AUTO: 261 10E3/UL (ref 150–450)
POTASSIUM SERPL-SCNC: 3.6 MMOL/L (ref 3.4–5.3)
RBC # BLD AUTO: 5.16 10E6/UL (ref 4.4–5.9)
SODIUM SERPL-SCNC: 134 MMOL/L (ref 136–145)
TOTAL PROTEIN SERUM FOR ELP: 7.1 G/DL (ref 6.4–8.3)
WBC # BLD AUTO: 6.8 10E3/UL (ref 4–11)

## 2023-06-29 PROCEDURE — 77412 RADIATION TX DELIVERY LVL 3: CPT

## 2023-06-29 PROCEDURE — C9113 INJ PANTOPRAZOLE SODIUM, VIA: HCPCS | Mod: JZ | Performed by: PHYSICIAN ASSISTANT

## 2023-06-29 PROCEDURE — 77300 RADIATION THERAPY DOSE PLAN: CPT

## 2023-06-29 PROCEDURE — 84165 PROTEIN E-PHORESIS SERUM: CPT | Mod: TC | Performed by: STUDENT IN AN ORGANIZED HEALTH CARE EDUCATION/TRAINING PROGRAM

## 2023-06-29 PROCEDURE — 250N000013 HC RX MED GY IP 250 OP 250 PS 637: Performed by: INTERNAL MEDICINE

## 2023-06-29 PROCEDURE — 85025 COMPLETE CBC W/AUTO DIFF WBC: CPT | Performed by: INTERNAL MEDICINE

## 2023-06-29 PROCEDURE — 84155 ASSAY OF PROTEIN SERUM: CPT | Performed by: INTERNAL MEDICINE

## 2023-06-29 PROCEDURE — 97165 OT EVAL LOW COMPLEX 30 MIN: CPT | Mod: GO

## 2023-06-29 PROCEDURE — 77290 THER RAD SIMULAJ FIELD CPLX: CPT

## 2023-06-29 PROCEDURE — 36415 COLL VENOUS BLD VENIPUNCTURE: CPT | Performed by: INTERNAL MEDICINE

## 2023-06-29 PROCEDURE — 84165 PROTEIN E-PHORESIS SERUM: CPT | Mod: 26

## 2023-06-29 PROCEDURE — 77295 3-D RADIOTHERAPY PLAN: CPT

## 2023-06-29 PROCEDURE — 99232 SBSQ HOSP IP/OBS MODERATE 35: CPT | Performed by: INTERNAL MEDICINE

## 2023-06-29 PROCEDURE — 250N000011 HC RX IP 250 OP 636: Performed by: INTERNAL MEDICINE

## 2023-06-29 PROCEDURE — 77334 RADIATION TREATMENT AID(S): CPT

## 2023-06-29 PROCEDURE — 250N000012 HC RX MED GY IP 250 OP 636 PS 637: Performed by: INTERNAL MEDICINE

## 2023-06-29 PROCEDURE — 97110 THERAPEUTIC EXERCISES: CPT | Mod: GP

## 2023-06-29 PROCEDURE — 80048 BASIC METABOLIC PNL TOTAL CA: CPT | Performed by: INTERNAL MEDICINE

## 2023-06-29 PROCEDURE — 97530 THERAPEUTIC ACTIVITIES: CPT | Mod: GP

## 2023-06-29 PROCEDURE — 97530 THERAPEUTIC ACTIVITIES: CPT | Mod: GO

## 2023-06-29 PROCEDURE — 86334 IMMUNOFIX E-PHORESIS SERUM: CPT | Performed by: STUDENT IN AN ORGANIZED HEALTH CARE EDUCATION/TRAINING PROGRAM

## 2023-06-29 PROCEDURE — 97162 PT EVAL MOD COMPLEX 30 MIN: CPT | Mod: GP

## 2023-06-29 PROCEDURE — 250N000013 HC RX MED GY IP 250 OP 250 PS 637: Performed by: PHYSICIAN ASSISTANT

## 2023-06-29 PROCEDURE — 84153 ASSAY OF PSA TOTAL: CPT | Performed by: INTERNAL MEDICINE

## 2023-06-29 PROCEDURE — 120N000001 HC R&B MED SURG/OB

## 2023-06-29 PROCEDURE — 86334 IMMUNOFIX E-PHORESIS SERUM: CPT | Mod: 26

## 2023-06-29 PROCEDURE — 250N000011 HC RX IP 250 OP 636: Mod: JZ | Performed by: PHYSICIAN ASSISTANT

## 2023-06-29 RX ORDER — OXYCODONE HYDROCHLORIDE 5 MG/1
5-10 TABLET ORAL EVERY 4 HOURS PRN
Status: DISCONTINUED | OUTPATIENT
Start: 2023-06-29 | End: 2023-07-10 | Stop reason: HOSPADM

## 2023-06-29 RX ORDER — PANTOPRAZOLE SODIUM 40 MG/1
40 TABLET, DELAYED RELEASE ORAL
Status: DISCONTINUED | OUTPATIENT
Start: 2023-06-29 | End: 2023-07-10 | Stop reason: HOSPADM

## 2023-06-29 RX ORDER — TAMSULOSIN HYDROCHLORIDE 0.4 MG/1
0.4 CAPSULE ORAL DAILY
Status: DISCONTINUED | OUTPATIENT
Start: 2023-06-29 | End: 2023-07-10 | Stop reason: HOSPADM

## 2023-06-29 RX ORDER — ACETAMINOPHEN 500 MG
1000 TABLET ORAL 3 TIMES DAILY
Status: DISCONTINUED | OUTPATIENT
Start: 2023-06-29 | End: 2023-07-10 | Stop reason: HOSPADM

## 2023-06-29 RX ORDER — LIDOCAINE 4 G/G
1 PATCH TOPICAL
Status: DISCONTINUED | OUTPATIENT
Start: 2023-06-29 | End: 2023-07-02

## 2023-06-29 RX ORDER — SIMETHICONE 80 MG
80 TABLET,CHEWABLE ORAL EVERY 6 HOURS PRN
Status: DISCONTINUED | OUTPATIENT
Start: 2023-06-29 | End: 2023-07-10 | Stop reason: HOSPADM

## 2023-06-29 RX ADMIN — PANTOPRAZOLE SODIUM 40 MG: 40 INJECTION, POWDER, FOR SOLUTION INTRAVENOUS at 08:14

## 2023-06-29 RX ADMIN — TAMSULOSIN HYDROCHLORIDE 0.4 MG: 0.4 CAPSULE ORAL at 08:15

## 2023-06-29 RX ADMIN — ACETAMINOPHEN 1000 MG: 500 TABLET ORAL at 22:44

## 2023-06-29 RX ADMIN — PANTOPRAZOLE SODIUM 40 MG: 40 TABLET, DELAYED RELEASE ORAL at 15:13

## 2023-06-29 RX ADMIN — DEXAMETHASONE 4 MG: 4 TABLET ORAL at 08:15

## 2023-06-29 RX ADMIN — ATORVASTATIN CALCIUM 10 MG: 10 TABLET, FILM COATED ORAL at 08:15

## 2023-06-29 RX ADMIN — HYDROMORPHONE HYDROCHLORIDE 0.3 MG: 1 INJECTION, SOLUTION INTRAMUSCULAR; INTRAVENOUS; SUBCUTANEOUS at 11:09

## 2023-06-29 RX ADMIN — ACETAMINOPHEN 1000 MG: 500 TABLET ORAL at 15:13

## 2023-06-29 RX ADMIN — DEXAMETHASONE 4 MG: 4 TABLET ORAL at 22:00

## 2023-06-29 RX ADMIN — HYDROMORPHONE HYDROCHLORIDE 0.3 MG: 1 INJECTION, SOLUTION INTRAMUSCULAR; INTRAVENOUS; SUBCUTANEOUS at 14:06

## 2023-06-29 ASSESSMENT — ACTIVITIES OF DAILY LIVING (ADL)
PREVIOUS_RESPONSIBILITIES: MEAL PREP;HOUSEKEEPING;LAUNDRY;MEDICATION MANAGEMENT;FINANCES;DRIVING;WORK
ADLS_ACUITY_SCORE: 27
ADLS_ACUITY_SCORE: 26
ADLS_ACUITY_SCORE: 27
ADLS_ACUITY_SCORE: 26

## 2023-06-29 NOTE — PLAN OF CARE
Primary Diagnosis: New mets found involving the stomach and thoracic spine.   Orientation: Aox4  Vital signs: VSS on RA.   Aggression Stop Light: Green  Mobility: A2 w/ GB + W  Pain Management: PRN dilaudid, PRN oxy. Denies pain this shift.   Diet: clears  Bowel/Bladder: straight cath x2 overnight. Last bladder scan= 1,039. Pt feels pressure on both sides of abdomen/bladder. Was only able to urinate 150 on own. Unable to have BM on BSC.   Abnormal Lab/Assessments: BLE numbness and tingling. Pt reports feeling less numb and slight tingling in extremities.   Drain/Device/Wound: PIV SL. LR discontinued.   Other: Pt reported feeling itchy in the groin and on his back. Applied sween cream - effective. Will start flomax this AM.  Consults: HEmOnc, PT, OT, GI, RAdOnc  D/C Day/Goals/Place: Discharge pending.

## 2023-06-29 NOTE — PROGRESS NOTES
"SPIRITUAL HEALTH SERVICES Progress Note  [facility]  88    Saw spouse (Erika) of pt Enrique Dumas per Epic admission request. PT was away from room for procedure.    Patient/Family Understanding of Illness and Goals of Care - Erika reports that the couple were on there way to [place] for a planned vacation when Enrique developed swelling in his leg and other symptoms. Erika states that she is awaiting test results and recommendations for treatment from medical staff and is as yet uncertain about the extent or course of treatment or short or long terms goals of care.    Distress and Loss - Erika reports feeling \"overwhelmed\" by news and states that she is \"still processing it.\" She indicates that she is uncertain about caring for Enrique in her home and states that she will not be able to manage this if Enrique is not ambulatory.    Strengths, Coping, and Resources - Erika disclosed that the couple has two grown boys. She indicates that she is uncertain about her own support system and did not name any individuals that she feels she will be able to rely on for assistance in caring for her .    Meaning, Beliefs, and Spirituality - Erika is a member of the [employer] in [facility] and serves on the [employer]. She relies on her pipo for support and finds silent prayer to be helpful.    Plan of Care - Erika is aware of the availability of  services by request and welcomes future visits by . SH will follow while PT is in hospital.    Terry Prather  Associate    "

## 2023-06-29 NOTE — PROGRESS NOTES
06/29/23 1000   Appointment Info   Signing Clinician's Name / Credentials (PT) Moira Renee, PT, DPT   Rehab Comments (PT) High falls risk   Living Environment   People in Home spouse   Current Living Arrangements house   Home Accessibility stairs to enter home   Number of Stairs, Main Entrance 4   Transportation Anticipated family or friend will provide   Living Environment Comments Patient lives with his wife in a house.  There is a ramp leading to the 2 + 2 steps to enter.  All needs are met on the main floor.  He has a walk-in shower with grab bars and a shower chair and the toilet is comfort height.   Self-Care   Usual Activity Tolerance good   Current Activity Tolerance fair   Regular Exercise Yes   Equipment Currently Used at Home none   Fall history within last six months yes   Number of times patient has fallen within last six months 3  (Assisted fall with nursing staff 6/28)   Activity/Exercise/Self-Care Comment Patient reports baseline independence with I/ADLs and mobility.  He states he works on [employer].  Works 9+ hours per day.   General Information   Onset of Illness/Injury or Date of Surgery 06/27/23   Referring Physician Rohan Nickerson MD   Patient/Family Therapy Goals Statement (PT) Patient hopes to walk independently again.   Pertinent History of Current Problem (include personal factors and/or comorbidities that impact the POC) 67-year-old gentleman with multiple medical problems, including hypertension, hyperlipidemia, and congenital hard of hearing.  The patient has been having back pain since January of this year.   Existing Precautions/Restrictions fall   Cognition   Affect/Mental Status (Cognition) WFL   Orientation Status (Cognition) oriented x 4   Follows Commands (Cognition) WFL  ([place])   Pain Assessment   Patient Currently in Pain Yes, see Vital Sign flowsheet  (Back pain)   Integumentary/Edema   Integumentary/Edema Comments Age-related skin changes, RLE edema   Posture     Posture Forward head position;Protracted shoulders   Range of Motion (ROM)   Range of Motion ROM is WFL   Strength (Manual Muscle Testing)   Strength (Manual Muscle Testing) Able to perform R SLR;Able to perform L SLR;Deficits observed during functional mobility   Bed Mobility   Comment, (Bed Mobility) Not assessed, starting and ending session seated in recliner chair.   Transfers   Comment, (Transfers) MinAx2 sit <> stand, initially with unsafe hand placement.   Gait/Stairs (Locomotion)   Comment, (Gait/Stairs) Unable to ambulate, able to take marching steps with FWW and minAx2.   Balance   Balance Comments Impaired dynamic balance, buckling noted at knees when not hyperextended.  Patient at high risk of falls.   Sensory Examination   Sensory Perception Comments BLE numbness and tingling   Clinical Impression   Criteria for Skilled Therapeutic Intervention Yes, treatment indicated   PT Diagnosis (PT) Impaired functional mobility   Influenced by the following impairments Impaired BLE motor control, high falls risk, impaired balance, impulsive, impaired insight into deficits, decreased activity tolerance, back pain, BLE numbness/tingling   Functional limitations due to impairments Impaired IND with bed mobility, transfers, gait, and stairs   Clinical Presentation (PT Evaluation Complexity) Evolving/Changing   Clinical Presentation Rationale Clinical judgement, PMH, social support   Clinical Decision Making (Complexity) moderate complexity   Planned Therapy Interventions (PT) balance training;bed mobility training;cryotherapy;gait training;home exercise program;neuromuscular re-education;patient/family education;postural re-education;stair training;strengthening;transfer training;progressive activity/exercise   Anticipated Equipment Needs at Discharge (PT) walker, rolling   Risk & Benefits of therapy have been explained evaluation/treatment results reviewed;care plan/treatment goals reviewed;risks/benefits  reviewed;participants voiced agreement with care plan;participants included;patient;spouse/significant other;son   PT Total Evaluation Time   PT Eval, Moderate Complexity Minutes (61207) 5   Physical Therapy Goals   PT Frequency 5x/week   PT Predicted Duration/Target Date for Goal Attainment 07/07/23   PT Goals Bed Mobility;Transfers;Gait;Stairs   PT: Bed Mobility Independent;Supine to/from sit;Rolling   PT: Transfers Modified independent;Sit to/from stand;Bed to/from chair;Assistive device   PT: Gait Modified independent;150 feet;Rolling walker   PT: Stairs Supervision/stand-by assist;4 stairs;Assistive device   Interventions   Interventions Quick Adds Therapeutic Activity;Therapeutic Procedure   Therapeutic Procedure/Exercise   Ther. Procedure: strength, endurance, ROM, flexibillity Minutes (72355) 8   Symptoms Noted During/After Treatment none   Treatment Detail/Skilled Intervention Patient cued for LAQs and seated marches while short sitting in recliner chair.  Patient performing seated exercises but difficulty with slow, controlled eccentric movement.  Cues for exercise speed with some improvement.    Patient also cued for   Therapeutic Activity   Therapeutic Activities: dynamic activities to improve functional performance Minutes (62093) 23   Symptoms Noted During/After Treatment None   Treatment Detail/Skilled Intervention Patient greeted seated in recliner chair with wife and son present.  Wife attentive to patient's needs.  Co-treat with OT given complex mobility and assisted fall with nursing staff yesterday.  Patient very fearful of movement secondary to fall.  Patient cued for sit > stand from recliner chair, completes with minAx2 and unsafe hand placement pulling on walker.  In standing, patient able to progress from minAx2 to CGAx2 to maintain standing balance with BUEs on walker.  Patient with appropriate UE strength and control but BLE with impaired motor control, buckling at knees.  Patient tends to  hyperextend knees to maintain stand.  He murphy standing position for >15 minutes, cued for standing marches and LE kicks as pre-gait.  With stepping activity, patient demonstrates narrowing DEMETRIUS and tremulous LE activity.  Denies significant pain or fatigue.  Patient cued for x3 sit <> stands from recliner chair but completing x5.  Patient progressing from minAx2 to CGAx2, demonstrates safe hand placement following education.  Patient with decreased eccentric control with stand > sit secondary to impaired BLE motor control.  Patient ends session seated in recliner chair with call light in reach and chair alarm activated.   PT Discharge Planning   PT Plan Progress sit <> stands, pre-gait and gait with close w/c follow, Liko lift pants if continues to buckle   PT Discharge Recommendation (DC Rec) Transitional Care Facility   PT Rationale for DC Rec Patient presents significantly below his IND baseline, currently needing Ax2 for sit <> stands and pre-gait activities given impaired BLE motor control and high falls risk.  Patient resides in a house with his wife.  There are stairs to enter home.  Anticipate patient will require TCU at discharge to progress safety and independence with mobility useless he is able to progress to IND/mod I for bed mobility, transfers, and gait and supervision/SBA for stairs.  Patient to undergo radiation to thoracic spine which may improve his motor control.   PT Brief overview of current status Ax2 with FWW for sit <> stand, Ax2 SS for nursing   Total Session Time   Timed Code Treatment Minutes 31   Total Session Time (sum of timed and untimed services) 36

## 2023-06-29 NOTE — PROGRESS NOTES
Service Date: 2023    SUBJECTIVE:  The patient is a 67-year-old gentleman with metastatic disease.  This is most likely metastatic gastric cancer.  He had EGD, EUS and biopsy done yesterday.  Pathology is pending.    The patient has multiple bone metastases. He has back pain.  He was seen by radiation oncology and started on palliative radiation.    I met with the patient.  His wife and son were there.  The patient has back pain. Because of pain, he cannot ambulate.    PHYSICAL EXAMINATION:    GENERAL:  He was alert and oriented.  Not in any distress.  Rest of systems not examined.    ASSESSMENT:    1.  A 67-year-old gentleman with widely metastatic disease.  This is likely metastatic gastric cancer.  2.  Back pain from bone metastases.    PLAN:    1.  The patient is clinically stable.  I explained to them that pathology is pending.  I will call the pathologist tomorrow.  2.  For back pain secondary to bone metastases, he was started on palliative radiation.  Hopefully, that will help.  He is on dexamethasone.  That will be continued.  Pain medication will be continued.  3.  The patient and wife had questions regarding treatment.  Again, this will depend on pathology.  Main treatment is going to be palliative chemotherapy for gastric cancer.  Further discussion after pathology is back.  4.  We will check a PSA.  5.  Hematology/oncology will continue to follow.    Alexei Lord MD        D: 2023   T: 2023   MT: mariana    Name:     LYLA BOURGEOIS  MRN:      [id number]        Account:      [id number]   :      1955           Service Date: 2023       Document: [id number]

## 2023-06-29 NOTE — PROGRESS NOTES
Patient was brought down this AM to Radiation therapy to be simulated and planning begun for treatment to his Thoracic spine.    He has returned this PM for Treatment # 1 to his T-spine.  400 cGy total dose to date delivered with 10 MV Photons.  Patient has 4 treatments remaining.  SE  RTT

## 2023-06-29 NOTE — PROGRESS NOTES
Notified provider about indwelling wick catheter discussed removal or continued need.    Did provider choose to remove indwelling wick catheter? No    Provider's wick indication for keeping indwelling wick catheter: Retention.    Is there an order for indwelling wick catheter? Yes    *If there is a plan to keep wick catheter in place at discharge daily notification with provider is not necessary, but please add a notation in the treatment team sticky note that the patient will be discharging with the catheter.

## 2023-06-29 NOTE — PROGRESS NOTES
[facility]    Internal Medicine Hospitalist Progress Note  06/29/2023  I evaluated patient on the above date.    Rohan Nickerson Jr., MD  [phone] (p)  Text Page  Vocera        Assessment & Plan New actions/orders today (06/29/2023) are underlined. All lab results in the assessment and plan were reviewed.    Enrique Dumas is a 67 year old male with history including HTN; HLD; and congenital [place]; who presented 6/27/2023 with right leg swelling with recent back pain. 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     On initially evaluation, pt was afebrile, hemodynamically stable, not hypoxic. ECG showed SR with NSTWA inferior limb leads, no acute ischemic changes. Labs notable for CBC normal; BMP with sodium 135, potassium 2.7; LFT's and lipase normal; trop negative; d-dimer 0.69; UA negative for signs of infection.    Right lower extremity US 6/28 negative for DVT.     CT CAP 6/28 showed no evidence for pulmonary emboli; proximal gastric wall thickening concerning for an infiltrative or neoplastic process which included a soft tissue mass along the posterior margin of the proximal stomach confluent with the adjacent spleen and pancreatic tail; sclerotic changes involving the T10 -T12 vertebral bodies with destructive changes of T12 and abnormal surrounding paravertebral soft tissue extending into the posterior mediastinum, findings also concerning for a neoplastic process; indeterminate 9 mm pulmonary nodule right lung apex could represent a metastatic lesion; hypodense right hepatic lobe lesion concerning for metastasis. Splenic metastasis also possible.    MRI C-spine and L-spine 6/27 negative for acute findings. MRI T-spine 6/27 showed diffuse infiltrative process of the bone marrow of the thoracic spine both anteriorly and posteriorly with extraosseous extension a diffuse involvement of the neural foramen consistent with diffuse  metastatic disease; extraosseous extension leads canal compromise and neural foraminal narrowing from the T8 to the T10-T11 disc space level; no abnormal signal thoracic spinal cord; prominent involvement of the posterior ribs in the thoracic region with extension into the soft tissues.      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  RLE pain and swelling, suspect related to above.  Back pain and abdominal pain related to above.  * Initial presentation and imaging as above. Pt presented with right lower extremity edema but had been having back pain since the beginning of the year; also had c/o abdominal pain. GI and Oncology consulted on admit. Started on IV pantoprazole on admit.  * On 6/28, pt c/o worsened right thigh pain and swelling. CT thigh did not show any acute findings. Underwent EGD that showed normal esophagus; enlarged gastric folds with hemorrhage, biopsied; erosive gastropathy with no bleeding and no stigmata of recent bleeding; normal duodenal bulb, first portion of the duodenum and second portion of the duodenum. 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 Seen by Oncology and was started on dexamethasone. Radiation Oncology consulted for spine  radiation.  * On 6/29, still with back pain, but thigh pain and swelling improved.  - Follow-up path results.  - Plan palliative spine radiation x5 per Radiation Oncology.  - Oncology following, plan palliative chemotherapy and outpatient PET scan pending path results.  - Continue dexamethasone 4 mg q12h.  - Schedule acetaminophen 1000 mg TID.  - Add lidocaine patch.  - Increase PRN oxycodone 5 mg --> 5-10 mg q4h.  - Continue PRN acetaminophen, PRN cyclobenzaprine, PRN IV hydromorphone; minimize opioids as able.  - Continue to keep right lower extremity elevated.  - Appreciate consultant help.    Erosive gastropathy on EGD.  * Initial presentation as above. Started on pantoprazole on admit.  * EGD 6/28 as above.  - Continue pantoprazole .    Urinary retention, question from immobility, possible BPH.  * On 6/28, pt noted with urinary retention requiring multiple SIC.  * On 6/29, started on tamsulosin. Continued to have retention with pain/discomfort with multiple straight catheterizations.  - Place Downing for now for retention.  - Continue tamsulosin.    Hypertension (benign essential).  [PTA: valsartan-HCTZ 320-25 mg daily.]  * Valsartan-HCTZ held on admit due to hypokalemia.  - Continue to hold valsartan-HCTZ.    Hyponatremia, suspect nutritional/hypovolemia.  * Sodium 135 on admit. Pt started on LR on admit.  * IVF's completed 6/28.  Recent Labs   Lab 06/29/23  0727 06/28/23  0816 06/27/23  1856   * 132* 135*   - Continue to monitor BMP - repeat in am.    Hypokalemia.  * Potassium low on admit, suspect due to decreased PO intake and diuretic. PTA diuretic held on admit.  Recent Labs   Lab 06/29/23  0727 06/28/23  0816 06/27/23  1856   POTASSIUM 3.6 3.6 2.7*   - Continue PRN K replacement.    Hyperlipidemia/dyslipidemia.  - Continue atorvastatin.    Weakness and physical deconditioning due to multiple acute medical issues.  * PT and OT consulted.  - Continue PT and OT.  - Continue to treat other issues as  "noted.      Clinically Significant Risk Factors        # Hypokalemia: Lowest K = 2.7 mmol/L in last 2 days, will replace as needed                  # Obesity: Estimated body mass index is 31.58 kg/m  as calculated from the following:    Height as of this encounter: 1.803 m (5' 11\").    Weight as of this encounter: 102.7 kg (226 lb 6.6 oz)., PRESENT ON ADMISSION            COVID-19 testing.  COVID-19 PCR Results        9/20/2021    10:02   COVID-19 PCR Results   COVID-19 Virus by PCR (External Result) Negative        Details          This result is from an external source.         COVID-19 Antibody Results, Testing for Immunity         No data to display                Diet: Mechanical/Dental Soft Diet  Room Service    Prophylaxis: PCD's, ambulation.   Downing Catheter: Not present  Lines: None     Code Status: Full Code    Disposition Plan   Expected discharge: 1-2d recommended to prior living arrangement pending above.  Entered: Rohan Nickerson MD 06/29/2023, 10:00 AM       Communication.  - I d/w pt's wife 6/29.    Interval History    Issues with urinary retention requiring SIC yesterday.  Doing better today, though still unable to void, and Downing has been placed.  Right thigh symptoms improved.  Able to ambulate better.  Still with back pain; meds don't work fast enough at times.    -Data reviewed today: I reviewed all new labs and imaging over the last 24 hours. I personally reviewed no images or EKG's today.    Physical Exam    , Blood pressure 115/70, pulse 85, temperature 98.1  F (36.7  C), temperature source Oral, resp. rate 16, height 1.803 m (5' 11\"), weight 102.7 kg (226 lb 6.6 oz), SpO2 97 %. O2 Device: None (Room air)    Vitals:    06/27/23 1845 06/28/23 1659   Weight: 104.3 kg (230 lb) 102.7 kg (226 lb 6.6 oz)     Vital Signs with Ranges  Temp:  [98.1  F (36.7  C)-98.5  F (36.9  C)] 98.1  F (36.7  C)  Pulse:  [67-98] 85  Resp:  [5-28] 16  BP: ()/(53-79) 115/70  SpO2:  [89 %-100 %] 97 " %  Patient Vitals for the past 24 hrs:   BP Temp Temp src Pulse Resp SpO2 Weight   06/29/23 0739 115/70 98.1  F (36.7  C) Oral 85 16 97 % --   06/28/23 1927 98/75 98.5  F (36.9  C) Oral 83 -- 95 % --   06/28/23 1700 114/77 -- -- 84 18 97 % --   06/28/23 1659 -- -- -- -- -- -- 102.7 kg (226 lb 6.6 oz)   06/28/23 1630 100/62 -- -- 69 17 (!) 89 % --   06/28/23 1621 99/66 -- -- 71 26 91 % --   06/28/23 1620 99/66 -- -- 71 28 91 % --   06/28/23 1614 -- -- -- -- 24 93 % --   06/28/23 1613 (!) 82/55 -- -- 72 -- -- --   06/28/23 1605 -- -- -- 72 19 98 % --   06/28/23 1600 (!) 82/53 -- -- 67 25 99 % --   06/28/23 1555 103/63 -- -- 76 26 100 % --   06/28/23 1553 -- -- -- 80 12 99 % --   06/28/23 1551 -- -- -- 83 14 98 % --   06/28/23 1550 113/68 -- -- 85 20 99 % --   06/28/23 1549 -- -- -- 73 16 99 % --   06/28/23 1548 -- -- -- 68 13 99 % --   06/28/23 1547 98/59 -- -- 67 -- -- --   06/28/23 1546 -- -- -- 88 (!) 7 94 % --   06/28/23 1545 -- -- -- 92 23 100 % --   06/28/23 1544 -- -- -- 93 (!) 8 100 % --   06/28/23 1543 -- -- -- 98 17 100 % --   06/28/23 1541 -- -- -- 89 (!) 6 100 % --   06/28/23 1540 -- -- -- 88 11 100 % --   06/28/23 1539 -- -- -- 89 (!) 7 100 % --   06/28/23 1538 -- -- -- 91 (!) 5 100 % --   06/28/23 1536 -- -- -- 90 (!) 5 100 % --   06/28/23 1535 -- -- -- 90 (!) 5 100 % --   06/28/23 1534 -- -- -- 90 (!) 8 100 % --   06/28/23 1533 -- -- -- 91 15 100 % --   06/28/23 1531 -- -- -- 93 (!) 7 100 % --   06/28/23 1530 -- -- -- -- 25 93 % --   06/28/23 1523 122/73 -- -- 91 18 100 % --   06/28/23 1302 118/79 -- -- 88 18 96 % --     I/O's Last 24 hours  I/O last 3 completed shifts:  In: -   Out: 2020 [Urine:2020]    Constitutional: Awake, alert, oriented, pleasant.  Respiratory: Diminished in bases. No crackles or wheezes.  Cardiovascular: RRR, no m/r/g.  GI: Soft, nd, nt to light touch, +BS.  Skin/Integumen:   Other:        Data    Labs reviewed.  Recent Labs   Lab 06/29/23  0727 06/28/23  0816 06/27/23  2343    WBC 6.8 7.0 7.9   HGB 15.3 14.7 13.8   MCV 87 87 85    244 246   * 132* 135*   POTASSIUM 3.6 3.6 2.7*   CHLORIDE 96* 94* 98   CO2 23 26 24   BUN 14.1 17.6 17.3   CR 0.65* 0.81 0.74   ANIONGAP 15 12 13   ESTEAFNY 9.9 9.4 8.9   * 111* 96   ALBUMIN  --  4.1 4.0   PROTTOTAL  --  7.1 6.7   BILITOTAL  --  0.7 0.3   ALKPHOS  --  91 82   ALT  --  19 18   AST  --  21 19   LIPASE  --   --  21     Recent Labs   Lab Test 06/27/23  1856   TROPONIN T HIGH SENSITIVITY 7     Recent Labs   Lab 06/29/23  0727 06/28/23  0816 06/27/23  1856   * 111* 96     No lab results found.    No results for input(s): INR, [id number] in the last 168 hours.  Recent Labs   Lab 06/29/23  0727 06/28/23  0816 06/27/23  1856   WBC 6.8 7.0 7.9   DD  --   --  0.69*       MICRO:  CULTURES (INCLUDING BLOOD AND URINE):  No lab results found in last 7 days.    Recent Results (from the past 24 hour(s))   CT Femur Right w/o Contrast    Narrative    CT RIGHT LOWER EXTREMITY WITHOUT CONTRAST 6/28/2023 2:20 PM    CLINICAL HISTORY: 67-year-old with right thigh pain and swelling.    TECHNIQUE: Multiple contiguous axial CT images were obtained of the  right femur without IV contrast. Data was reformatted into bone and  soft tissue algorithms, in coronal and sagittal planes. Dose reduction  techniques were used.    COMPARISON: None.    FINDINGS:    Normal bones. No fracture or bone lesion.    Normal CT appearance of the muscles. No focal swelling or edema  visualized. No discrete intramuscular hematoma identified.    Mild prepatellar soft tissue swelling. No significant knee joint  effusion.    Normal soft tissues otherwise.    Contrast in bladder from prior CT.      Impression    IMPRESSION:    1.  Normal right thigh CT. No findings to account for the patient's  thigh swelling.    2.  Nonspecific prepatellar soft tissue edema/swelling without a  discrete bursal effusion.    PIPO LONGO MD         SYSTEM ID:  [id number]        Medications   All medications were reviewed.    Infusions:    Scheduled Medications:    atorvastatin  10 mg Oral Daily     dexamethasone  4 mg Oral Q12H [facility] (08/20)     pantoprazole  40 mg Oral BID AC     sodium chloride (PF)  3 mL Intracatheter Q8H     tamsulosin  0.4 mg Oral Daily     PRN Medications:  cyclobenzaprine, HYDROmorphone, lidocaine 4%, lidocaine (buffered or not buffered), melatonin, naloxone **OR** naloxone **OR** naloxone **OR** naloxone, ondansetron **OR** ondansetron, oxyCODONE, simethicone, sodium chloride (PF)

## 2023-06-29 NOTE — PROGRESS NOTES
06/29/23 0925   Appointment Info   Signing Clinician's Name / Credentials (OT) Connor Barr OTR/L   Living Environment   People in Home spouse   Current Living Arrangements house   Home Accessibility stairs to enter home  (Has ramp enterance leading up to steps.)   Number of Stairs, Main Entrance 4  (2 then 2)   Transportation Anticipated family or friend will provide   Living Environment Comments Pt reports living in house w/ spouse. Bed/bath on main level. Does not use basement. Walk in shower w/ shower chair and grab bars. Comfort height toilet.   Self-Care   Usual Activity Tolerance good   Current Activity Tolerance poor   Equipment Currently Used at Home none   Fall history within last six months yes   Number of times patient has fallen within last six months 3   Activity/Exercise/Self-Care Comment Pt reports being IND w/ all ADL's at baseline prior to admission.   Instrumental Activities of Daily Living (IADL)   Previous Responsibilities meal prep;housekeeping;laundry;medication management;finances;driving;work   IADL Comments Pt reports being IND w/ all IADL's at baseline prior to admission. Pt was working full time and still driving.   General Information   Onset of Illness/Injury or Date of Surgery 06/27/23   Referring Physician Rohan Nickerson MD   Patient/Family Therapy Goal Statement (OT) None stated   Additional Occupational Profile Info/Pertinent History of Current Problem Enrique Dumas is a 67 year old male with history including HTN; HLD; and congenital [place]; who presented 6/27/2023 with right leg swelling with recent back pain. 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   Existing Precautions/Restrictions fall   Cognitive Status Examination   Orientation Status orientation to person, place and time   Visual Perception   Visual Impairment/Limitations corrective lenses full-time   Sensory   Sensory Comments Reports 50% numbness in R foot  and 25% numbness in L foot   Pain Assessment   Patient Currently in Pain Yes, see Vital Sign flowsheet  (0/10 at rest, 6/10 when supine)   Range of Motion Comprehensive   General Range of Motion no range of motion deficits identified   Strength Comprehensive (MMT)   General Manual Muscle Testing (MMT) Assessment no strength deficits identified   Transfers   Transfers sit-stand transfer   Sit-Stand Transfer   Sit-Stand [place] (Transfers) minimum assist (75% patient effort);2 person assist   Assistive Device (Sit-Stand Transfers) walker, front-wheeled   Activities of Daily Living   BADL Assessment/Intervention lower body dressing   Lower Body Dressing Assessment/Training   Position (Lower Body Dressing) unsupported sitting   Comment, (Lower Body Dressing) SBA   Clinical Impression   Criteria for Skilled Therapeutic Interventions Met (OT) Yes, treatment indicated   OT Diagnosis Decreased ADL independence and tolerance   Influenced by the following impairments Decreased ADL independence   OT Problem List-Impairments impacting ADL problems related to;activity tolerance impaired   Assessment of Occupational Performance 1-3 Performance Deficits   Identified Performance Deficits Toileting, toilet transfer   Planned Therapy Interventions (OT) ADL retraining;home program guidelines;progressive activity/exercise;risk factor education   Clinical Decision Making Complexity (OT) low complexity   Risk & Benefits of therapy have been explained evaluation/treatment results reviewed;care plan/treatment goals reviewed;risks/benefits reviewed;current/potential barriers reviewed;patient   OT Total Evaluation Time   OT Eval, Low Complexity Minutes (76316) 12   OT Goals   Therapy Frequency (OT) 5 times/wk   OT Predicted Duration/Target Date for Goal Attainment 07/06/23   OT Goals Lower Body Dressing;Lower Body Bathing;Toilet Transfer/Toileting   OT: Lower Body Dressing Modified independent;including set-up/clothing retrieval   OT:  Lower Body Bathing Modified independent   OT: Toilet Transfer/Toileting Modified independent;toilet transfer;cleaning and garment management   Interventions   Interventions Quick Adds Therapeutic Activity   Therapeutic Activities   Therapeutic Activity Minutes (43015) 27   Symptoms noted during/after treatment fatigue   Treatment Detail/Skilled Intervention Pt greeted sitting up in bedside chair w/ family present in room. Agreeable for OT evaluation. Physician entered room to talk w/ pt and family during therapy session. Co-tx w/ PT due to pt's level of assist required for mobility and following recent fall w/ nursing staff. SBA to doff/don B socks while seated in chair using cross leg method. Min A x 2 progressing to CGA x 2 for total of x6 stands w/ use of FWW. Cues for safe hand placement w/ transfers. Pt able to tolerate standing for approx. 15 minutes and completed marches/weight shifting. Decreased motor control noted w/ BLE's with movement and with controlling decent. Pt showed improvement w/ hand placement w/ transfers at end of therapy session. Pt remained seated in chair at end of therapy session w/ PT still present in room.   OT Discharge Planning   OT Plan Toilet/commode transfer   OT Discharge Recommendation (DC Rec) home with home care occupational therapy;Transitional Care Facility   OT Rationale for DC Rec Pt is currently functioning well below baseline. Pt currently requiring assist x 2 w/ all transfers due to decreased motor control in BLE's. Pt reports being IND w/ ADL's at baseline and working 9-13 hour days. At this time, pt would benefit from TCU stay to help return to PLOF. Pt will be receiving radiation here at hospital which may help improve motor control. Will continue to update d/c recommendation as appropriate.   OT Brief overview of current status Min A/CGA x 2 transfers w/ FWW, SBA LB dressing   Total Session Time   Timed Code Treatment Minutes 27   Total Session Time (sum of timed and  untimed services) 39

## 2023-06-29 NOTE — PROGRESS NOTES
UROLOGY BRIEF NOTE    Chart reviewed; pt being worked up for likely metastatic malignancy, possible GI source, now starting palliative radiation and chemo.     Noted to be retaining urine yesterday with elevated PVRs between 700 ml - 1 L, only passing small volumes of urine. Straight cath'd several times until this became uncomfortable for patient, so indwelling wick was placed early today. Started on Flomax.     Urology consulted for hematuria -- I spoke to RN via phone who reports urine was noted to be pink overnight, and since her shift started 7am wick has been draining consistently pink/bloody urine. No issues with obstruction or clot.     Patient not on any blood thinners. Cr WNL. UA from 6/27 WNL.     CT chest/abd/pelv 6/27 from  perspective showing no stones or hydronephrosis; enlarged prostate.    PSA from last fall = 0.75 ng/mL      A/P  66yo M with urinary retention and gross hematuria following multiple straight caths.     - Continue wick to minimize ongoing trauma with straight cath. Given degree of retention, recommend 1-2 weeks with wick prior to trial of void -- I will discuss with patient in AM.   - Continue to monitor; may hand irrigate PRN if any concern for obstruction  - I will see patient for formal consult tomorrow    Miranda Bello PA-C (Jackie)  [facility]  Office: [phone]  Pager: [phone]    Imaging:    CT Chest Abd Pelv W 6/27/23                                                              IMPRESSION:  1.  No evidence for pulmonary emboli.  2.  Mosaic attenuation both lower lobes suggesting air trapping.  3.  No renal calculi or hydronephrosis.  4.  Proximal gastric wall thickening concerning for an infiltrative or neoplastic process. This includes a soft tissue mass along the posterior margin of the proximal stomach confluent with the adjacent spleen and pancreatic tail. GI consultation   recommended.  5.  Sclerotic changes involving the T10 -T12 vertebral  bodies with destructive changes of T12 and abnormal surrounding paravertebral soft tissue extending into the posterior mediastinum. Findings also concerning for a neoplastic process. MRI of the spine   recommended for further evaluation.  6.  Indeterminate 9 mm pulmonary nodule right lung apex could represent a metastatic lesion.  7.  Hypodense right hepatic lobe lesion concerning for metastasis. Splenic metastasis also possible.

## 2023-06-29 NOTE — PROGRESS NOTES
[facility]  Gastroenterology Progress Note     Enrique Dumas MRN# [id number]   YOB: 1955 Age: 67 year old          Assessment and Plan:   Enrique Dumas is a 67 year old male with congenital [place], history of hypertension and hyperlipidemia who presents with right leg swelling. The patient has been having back pain since the beginning of this year.  He has a 20 lbs weight loss.  He has some RLE swelling and came in and found to have abnormal imaging studies and concern for metastatic disease.     Gastric mass  Metastatic malignant neoplasm, unspecified site (H)  - presented with 20 pound weight loss, back pain for 6 months  - 6/26 RUQ ultrasound unremarkable  -6/27 CT of chest noted prox gastric wall thickening along with soft tissue mass posterior margin of proximal stomach, sclerotic changes T10-T12 with destructive changes at T12.  9 mm pulmonary nodule RUL, hypodense hepatic lobe lesion and possible splenic mets.  - 6/27 MRI of cervical, thoracic and lumbar spine- Diffuse infiltrative process of the bone marrow of the thoracic spine both anteriorly and posteriorly with extraosseous extension a diffuse involvement of the neural foramen consistent with diffuse metastatic disease.  -Labs essentially unremarkable  - 6/28EGD noted localized prominent gastric fold with infiltrative mass with hemorrhage. Biopsied taken. Multiple dispersed erosions with no bleeding and no stigmata of recent bleed  -6/29 EUS mass in fundus of stomach - highly suspicious for malignant stromal cell neoplasm ( staged at T4N1M1. FNA performed. Malignant lymph nodes  In left gastric region , gastrohepatic ligament, celiac region- tissue obtained. Liver and pancreas appeared normal    -- Appreciate Heme/Onc consult- recommend radiation oncology consult for thoracic bone mets and to start on dexamethasone. Planning PET scan post pathology results  -- increase PPI to BID due to increase risk of upper GI  bleed  -- mechanical soft diet  -- trial of prn simethicone  -- Patient stable from Gi standpoint- ok to discharge per GI                  Interval History:   doing well; no cp, sob, n/v/d, or abd pain. and has some gas post prandial              Review of Systems:   C: NEGATIVE for fever, chills, change in weight  E/M: NEGATIVE for ear, mouth and throat problems  R: NEGATIVE for significant cough or SOB  CV: NEGATIVE for chest pain, palpitations or peripheral edema             Medications:   I have reviewed this patient's current medications    atorvastatin  10 mg Oral Daily     dexamethasone  4 mg Oral Q12H MADHAVI (08/20)     pantoprazole  40 mg Intravenous Daily with breakfast     sodium chloride (PF)  3 mL Intracatheter Q8H     tamsulosin  0.4 mg Oral Daily                  Physical Exam:   Vitals were reviewed  Vital Signs with Ranges  Temp:  [98.1  F (36.7  C)-98.5  F (36.9  C)] 98.1  F (36.7  C)  Pulse:  [67-98] 85  Resp:  [5-28] 16  BP: ()/(53-79) 115/70  SpO2:  [89 %-100 %] 97 %  I/O last 3 completed shifts:  In: -   Out: 2020 [Urine:2020]  Constitutional: healthy, alert and no distress   Cardiovascular: negative, PMI normal. No lifts, heaves, or thrills. RRR. No murmurs, clicks gallops or rub  Respiratory: negative, Percussion normal. Good diaphragmatic excursion. Lungs clear  Abdomen: Abdomen soft, non-tender. BS normal. No masses, organomegaly             Data:   I reviewed the patient's new clinical lab test results.   Recent Labs   Lab Test 06/29/23 0727 06/28/23 0816 06/27/23 1856   WBC 6.8 7.0 7.9   HGB 15.3 14.7 13.8   MCV 87 87 85    244 246     Recent Labs   Lab Test 06/28/23 0816 06/27/23  1856   POTASSIUM 3.6 2.7*   CHLORIDE 94* 98   CO2 26 24   BUN 17.6 17.3   ANIONGAP 12 13     Recent Labs   Lab Test 06/28/23 0816 06/27/23 2001 06/27/23 1856   ALBUMIN 4.1  --  4.0   BILITOTAL 0.7  --  0.3   ALT 19  --  18   AST 21  --  19   PROTEIN  --  Negative  --    LIPASE  --   --  21        I reviewed the patient's new imaging results.    All laboratory data reviewed  All imaging studies reviewed by me.    Amelia Bunch PA-C,  6/29/2023  [facility]  Office : [phone]  Cell: [phone] (Dr. Gaston)  Cell: [phone] (Amelia Bunch PA-C)

## 2023-06-29 NOTE — CONSULTS
Consult Date: 06/28/2023    This consult has been requested by Dr. Terry Ortega for metastatic disease.    The patient is a 67-year-old gentleman with multiple medical problems including hypertension, hyperlipidemia and congenital hard of hearing.  The patient has been having back pain since January,2023.  He has been following up with chiropractor.  Pain is more when he lies down.  Pain is less with activities.    The patient lives in [place], [place].  The patient came to [facility] as he was getting ready to go on an [employer] cruise.  The patient started to have right lower extremity swelling and some discomfort.  He presented to the emergency room and had multiple investigations done.  -Normal CBC.  -CMP revealed hypokalemia and hyponatremia.  -Elevated D-dimer.  -UA is essentially normal.  -Right lower extremity ultrasound does not reveal any DVT.  -CT chest angiogram and CT abdomen and pelvis were done.  There is no pulmonary emboli.  There is proximal gastric wall thickening concerning for infiltrative or neoplastic process.  There are sclerotic changes in the thoracic vertebra.  There is indeterminate 9 mm right lung nodule.  There is a hypodense hepatic lesion and also splenic lesion.   -MRI of cervical, thoracic, and lumbar spine done.  There is diffuse infiltrative process of bone marrow of the thoracic spine.  There is pathological compression fracture of T10 vertebral body.  There is extraosseous extension with neural foraminal narrowing from T8-T11.  No abnormal signal in thoracic spinal cord.    REVIEW OF SYSTEMS:  No headache.  No dizziness.  No chest pain.  No shortness of breath.  No dysphagia.  Appetite has been decreased.  He has lost about 20 pounds of weight.  No urinary or bowel complaint.  No bleeding.    The patient has chronic back pain since January.  He has been seeing a chiropractor.  Now, he has some swelling and some discomfort in the right lower extremity.    ALLERGIES:   REVIEWED.    MEDICATIONS:  Reviewed.    PAST MEDICAL HISTORY:    1.  Hypertension.  2.  Hyperlipidemia.  3.  Congenital hard of hearing.  4.  Tonsillectomy.    SOCIAL HISTORY:   -No smoking.  -Social alcohol use.    PHYSICAL EXAMINATION:    He is alert, oriented x 3.  Not in any respiratory distress or pain.  EXTREMITIES:  Mild edema of the right lower extremity.  No edema of the left lower extremity.  Rest of systems not examined.    LABS:  Reviewed.    ASSESSMENT:    1.  A 67-year-old gentleman with gastric mass, bone metastasis, likely liver metastasis, splenic metastasis, and indeterminate lung nodule.  This is clinically consistent with metastatic gastric cancer.  2.  Right lower extremity swelling and pain.  No deep venous thrombosis.  3.  Back pain secondary to bone metastasis.  4.  Other medical problems including hypertension and hyperlipidemia.    RECOMMENDATIONS:    1.  I had a long discussion with the patient and his family.  CT scan and MRI was reviewed with them.  This reveals metastatic disease.    The patient had EGD and EUS done today.  On EUS, there is a mass in the fundus.  There are malignant-appearing nodes.  Based on EUS, patient has T4 N1 disease.    Gastric mass, biopsy was done.  I explained to them it will take 3-5 days to get the results.      2.  Once malignancy is confirmed, we will plan on getting a PET scan.  This will be done as outpatient.    3.  Discussed regarding treatment.  If gastric cancer is confirmed, main treatment is going to be palliative chemotherapy.  Further discussion in the clinic.  On the biopsy specimen, we will check HER-2/rachel, PD-L1 and MSI.    4.  The patient has been having back pain.  There is thoracic bone metastasis.  He will benefit from radiation.  We will get a radiation oncology consult.    I will start him on dexamethasone 4 mg twice a day.  He will benefit from that. Pain medication will be continued as needed.     5.  The patient will also need monthly  bisphosphonate.  This will be started after malignancy diagnosis confirmed.    6.  The patient has right lower extremity pain and swelling.  No DVT.  Pain could be referred pain.    For swelling, advised to use compression stocking.    7.  The patient and his wife had few questions, which were all answered.  Oncology will continue to follow.    Thanks for the consult.    TOTAL TIME SPENT:  80 minutes. Time was spent in today's visit, review of chart/investigations today and documentation today.    Alexei Lord MD        D: 2023   T: 2023   MT: mir    Name:     LYLA BOURGEOIS  MRN:      [id number]        Account:      [id number]   :      1955           Consult Date: 2023     Document: [id number]

## 2023-06-29 NOTE — CONSULTS
RADIATION ONCOLOGY CONSULTATION    HISTORY OF PRESENT ILLNESS: Briefly, Mr. Enrique Dumas is a 67-year-old gentleman with a new diagnosis of apparent widely metastatic malignancy, likely gastric carcinoma. He presented with increasing mid back pain radiating around his bilateral chest wall, as well as right leg numbness and swelling. CT of the chest/abdomen/pelvis on 6/27/2023 revealed a gastric mass and perigastric adenopathy, and there were sclerotic/destructive metastatic lesions in the T10-11 vertebral bodies, and extensive soft tissue thickening in the paravertebral regions extending inferiorly into the retroperitoneum and superiorly throughout the posterior mediastinum surrounding the descending thoracic aorta. There were also lesions concerning for metastases in the right upper lobe of the lung, liver, and spleen.     Spine MRI on 6/28/2023 revealed extensive marrow infiltration of the T1-T6 and T8-T10 vetebral bodies, with extraosseous extension into the spinal canal and neural foramen bilaterally in the T8-T11 region, with encasement of the spinal cord leading to moderate canal compromise (though without associated spinal cord edema). There was also extraosseous extension in the T3-T5 and T6-T7 levels into the neural foramen, but without evidence of canal compromise at these levels. There was also diffuse involvement of the posterior ribs.    On 6/28/2023 Dr. Gaston performed upper endoscopy and EUS, which revealed a large T4 gastric mass, and biopsy of this was performed, the results of which are currently pending. The patient was started on dexamethasone 4 mg every 6 hours. He is experiencing urinary retention and changes in bowel function. He has been recommended urgent palliative spine radiotherapy at this time.    PHYSICAL EXAMINATION:  The patient is sitting in his hospital chair in no acute distress. There is lower extremity weakness.    IMPRESSION/RECOMMENDATIONS: The patient is experiencing pain in  his lower thoracic spine region, which is the area of the most extensive thoracic spine metastases, with destructive vertebral body lesions and significant extraosseous extension with encasement of the spinal cord at this levels. Although there was no spinal cord edema on MRI, he has symptoms of early spinal cord compression. I recommended urgent palliative radiotherapy targeting this region.     He also has much more extensive malignancy throughout the thoracic spine region with massive soft tissue extension in the paravertebral regions extending inferiorly into the retroperitoneum and superiorly throughout the posterior mediastinum. I would not, however, recommend targeting this much larger area, as radiotherapy to this entire region would risk moderate toxicity, and is only a component of his generally widely metastatic malignancy.    I discussed with the patient the potential short-term side effects of palliative radiotherapy targeting the lower thoracic spine region, which include but are not limited to acute pain flare, nausea, poor appetite, loose stools, skin redness/irritation in the treated region, and fatigue. Potential long-term risks include but are not limited to low risk of damage/necrosis to treated tissues and low risk of a radiation-induced second malignancy.    The patient expressed an understanding of the above, and signed consent after his questions were answered. We we will begin the treatment planning process today, and we will plan to commence a 5-fraction course of palliative radiotherapy urgently later today. Thank you again for referring him for consideration of treatment, and please feel free to contact me with any questions.    Ray Gar MD  Radiation Oncology  [phone]

## 2023-06-29 NOTE — PROVIDER NOTIFICATION
Brief update:    Paged regarding urinary retention, 1 L retained and this would be his third straight catheterization    Reportedly no prior urine retention issues, though has required catheterization intermittently here since admission.    Continue with straight catheterizations while further delineating goals of care in the setting of metastatic disease; might consider chronic indwelling Downing catheter versus teaching for intermittent straight catheterizations pending these discussions.    Initiating Flomax    Discussed with nursing    Laureano Arenas MD  3:57 AM

## 2023-06-30 ENCOUNTER — APPOINTMENT (OUTPATIENT)
Dept: PHYSICAL THERAPY | Facility: CLINIC | Age: 68
DRG: 824 | End: 2023-06-30
Payer: MEDICARE

## 2023-06-30 ENCOUNTER — APPOINTMENT (OUTPATIENT)
Dept: OCCUPATIONAL THERAPY | Facility: CLINIC | Age: 68
DRG: 824 | End: 2023-06-30
Payer: MEDICARE

## 2023-06-30 LAB
ALBUMIN SERPL ELPH-MCNC: 4.1 G/DL (ref 3.7–5.1)
ALPHA1 GLOB SERPL ELPH-MCNC: 0.3 G/DL (ref 0.2–0.4)
ALPHA2 GLOB SERPL ELPH-MCNC: 0.9 G/DL (ref 0.5–0.9)
ANION GAP SERPL CALCULATED.3IONS-SCNC: 13 MMOL/L (ref 7–15)
B-GLOBULIN SERPL ELPH-MCNC: 0.9 G/DL (ref 0.6–1)
BUN SERPL-MCNC: 20 MG/DL (ref 8–23)
CALCIUM SERPL-MCNC: 9.5 MG/DL (ref 8.8–10.2)
CHLORIDE SERPL-SCNC: 96 MMOL/L (ref 98–107)
CREAT SERPL-MCNC: 0.66 MG/DL (ref 0.67–1.17)
DEPRECATED HCO3 PLAS-SCNC: 26 MMOL/L (ref 22–29)
GAMMA GLOB SERPL ELPH-MCNC: 0.9 G/DL (ref 0.7–1.6)
GFR SERPL CREATININE-BSD FRML MDRD: >90 ML/MIN/1.73M2
GLUCOSE SERPL-MCNC: 114 MG/DL (ref 70–99)
M PROTEIN SERPL ELPH-MCNC: 0 G/DL
POTASSIUM SERPL-SCNC: 3.5 MMOL/L (ref 3.4–5.3)
PROT PATTERN SERPL ELPH-IMP: NORMAL
PROT PATTERN SERPL IFE-IMP: NORMAL
PSA SERPL DL<=0.01 NG/ML-MCNC: 3.82 NG/ML (ref 0–4.5)
SODIUM SERPL-SCNC: 135 MMOL/L (ref 136–145)

## 2023-06-30 PROCEDURE — 99232 SBSQ HOSP IP/OBS MODERATE 35: CPT | Performed by: INTERNAL MEDICINE

## 2023-06-30 PROCEDURE — 97530 THERAPEUTIC ACTIVITIES: CPT | Mod: GO

## 2023-06-30 PROCEDURE — 250N000011 HC RX IP 250 OP 636: Performed by: INTERNAL MEDICINE

## 2023-06-30 PROCEDURE — 250N000013 HC RX MED GY IP 250 OP 250 PS 637: Performed by: INTERNAL MEDICINE

## 2023-06-30 PROCEDURE — 36415 COLL VENOUS BLD VENIPUNCTURE: CPT | Performed by: INTERNAL MEDICINE

## 2023-06-30 PROCEDURE — 97530 THERAPEUTIC ACTIVITIES: CPT | Mod: GP

## 2023-06-30 PROCEDURE — 250N000013 HC RX MED GY IP 250 OP 250 PS 637: Performed by: HOSPITALIST

## 2023-06-30 PROCEDURE — 77387 GUIDANCE FOR RADJ TX DLVR: CPT

## 2023-06-30 PROCEDURE — 120N000001 HC R&B MED SURG/OB

## 2023-06-30 PROCEDURE — 250N000013 HC RX MED GY IP 250 OP 250 PS 637: Performed by: PHYSICIAN ASSISTANT

## 2023-06-30 PROCEDURE — 250N000012 HC RX MED GY IP 250 OP 636 PS 637: Performed by: INTERNAL MEDICINE

## 2023-06-30 PROCEDURE — 77412 RADIATION TX DELIVERY LVL 3: CPT

## 2023-06-30 PROCEDURE — 80048 BASIC METABOLIC PNL TOTAL CA: CPT | Performed by: INTERNAL MEDICINE

## 2023-06-30 RX ORDER — GUAIFENESIN/DEXTROMETHORPHAN 100-10MG/5
10 SYRUP ORAL EVERY 4 HOURS PRN
Status: DISCONTINUED | OUTPATIENT
Start: 2023-06-30 | End: 2023-07-10 | Stop reason: HOSPADM

## 2023-06-30 RX ORDER — AMOXICILLIN 250 MG
1 CAPSULE ORAL AT BEDTIME
Status: DISCONTINUED | OUTPATIENT
Start: 2023-06-30 | End: 2023-07-10 | Stop reason: HOSPADM

## 2023-06-30 RX ORDER — POLYETHYLENE GLYCOL 3350 17 G/17G
17 POWDER, FOR SOLUTION ORAL DAILY
Status: DISCONTINUED | OUTPATIENT
Start: 2023-06-30 | End: 2023-07-10 | Stop reason: HOSPADM

## 2023-06-30 RX ORDER — BENZONATATE 100 MG/1
100 CAPSULE ORAL 3 TIMES DAILY PRN
Status: DISCONTINUED | OUTPATIENT
Start: 2023-06-30 | End: 2023-07-10 | Stop reason: HOSPADM

## 2023-06-30 RX ADMIN — ACETAMINOPHEN 1000 MG: 500 TABLET ORAL at 16:34

## 2023-06-30 RX ADMIN — TAMSULOSIN HYDROCHLORIDE 0.4 MG: 0.4 CAPSULE ORAL at 08:22

## 2023-06-30 RX ADMIN — DEXAMETHASONE 4 MG: 4 TABLET ORAL at 08:22

## 2023-06-30 RX ADMIN — SENNOSIDES AND DOCUSATE SODIUM 1 TABLET: 50; 8.6 TABLET ORAL at 21:19

## 2023-06-30 RX ADMIN — ACETAMINOPHEN 1000 MG: 500 TABLET ORAL at 08:22

## 2023-06-30 RX ADMIN — PANTOPRAZOLE SODIUM 40 MG: 40 TABLET, DELAYED RELEASE ORAL at 16:34

## 2023-06-30 RX ADMIN — GUAIFENESIN AND DEXTROMETHORPHAN 10 ML: 100; 10 SYRUP ORAL at 00:57

## 2023-06-30 RX ADMIN — ACETAMINOPHEN 1000 MG: 500 TABLET ORAL at 21:19

## 2023-06-30 RX ADMIN — ATORVASTATIN CALCIUM 10 MG: 10 TABLET, FILM COATED ORAL at 08:22

## 2023-06-30 RX ADMIN — HYDROMORPHONE HYDROCHLORIDE 0.3 MG: 1 INJECTION, SOLUTION INTRAMUSCULAR; INTRAVENOUS; SUBCUTANEOUS at 21:26

## 2023-06-30 RX ADMIN — POLYETHYLENE GLYCOL 3350 17 G: 17 POWDER, FOR SOLUTION ORAL at 21:18

## 2023-06-30 RX ADMIN — PANTOPRAZOLE SODIUM 40 MG: 40 TABLET, DELAYED RELEASE ORAL at 06:49

## 2023-06-30 RX ADMIN — DEXAMETHASONE 4 MG: 4 TABLET ORAL at 21:19

## 2023-06-30 RX ADMIN — HYDROMORPHONE HYDROCHLORIDE 0.3 MG: 1 INJECTION, SOLUTION INTRAMUSCULAR; INTRAVENOUS; SUBCUTANEOUS at 11:39

## 2023-06-30 ASSESSMENT — ACTIVITIES OF DAILY LIVING (ADL)
ADLS_ACUITY_SCORE: 26
ADLS_ACUITY_SCORE: 28
ADLS_ACUITY_SCORE: 26
ADLS_ACUITY_SCORE: 30
ADLS_ACUITY_SCORE: 28
ADLS_ACUITY_SCORE: 30
ADLS_ACUITY_SCORE: 26
ADLS_ACUITY_SCORE: 28
ADLS_ACUITY_SCORE: 28
ADLS_ACUITY_SCORE: 26

## 2023-06-30 NOTE — PROGRESS NOTES
Service Date: 2023    SUBJECTIVE:  The patient is a 67-year-old gentleman with metastatic disease.  He has gastric mass.  He has multiple sites of metastasis including bone.  He has back pain from thoracic vertebral metastasis.  He is getting palliative radiation.  He is on dexamethasone also.    The patient feels a little better.  Pain in the back is better.    No headache or dizziness.  No chest pain or shortness of breath.  No nausea or vomiting.    PHYSICAL EXAMINATION:    GENERAL:  He is alert and oriented.  Not in distress.  Rest of systems not examined.    ASSESSMENT:    1.  A 67-year-old gentleman with metastatic disease.  This is most likely metastatic gastric cancer.  2.  Back pain secondary to thoracic vertebral metastasis. Patient on palliative radiation.    PLAN:    1.  The patient is slowly improving.  Pain is better controlled.  He will continue on radiation.  He will continue on dexamethasone.  After one week, dexamethasone will be decreased to 4 mg once a day.  2.  Pathology is pending.  I have spoken to the pathologist. This could be lymphoma. They are doing further workup. We will wait for the pathology report and decide regarding further treatment after that.  3.  He had few questions, which were all answered.  Hematology/oncology will see him after pathology is back.    Alexei Lord MD        D: 2023   T: 2023   MT: mariana    Name:     LYLA BOURGEOIS  MRN:      [id number]        Account:      [id number]   :      1955           Service Date: 2023       Document: [id number]

## 2023-06-30 NOTE — PLAN OF CARE
Primary Diagnosis: New mets found involving the stomach and thoracic spine.   Orientation: Aox4  Vital signs: VSS on RA.   Aggression Stop Light: Green  Mobility: A2 w/ sarasteady  Pain Management: PRN dilaudid, PRN oxy. Denies pain this shift.   Diet: Mechanical   Bowel/Bladder: wick, adequate output.   Abnormal Lab/Assessments: BLE numbness and tingling. Pt reports feeling less numb and slight tingling in extremities.   Drain/Device/Wound: Replaced IV. RPIV SL.   Other: Pt reported sore throat, Robitussin given.    Consults: HEmOnc, PT, OT, GI, RAdOnc  D/C Day/Goals/Place: Discharge pending.

## 2023-06-30 NOTE — PROGRESS NOTES
[facility]    Internal Medicine Hospitalist Progress Note  06/30/2023  I evaluated patient on the above date.    Rohan Nickerson Jr., MD  [phone] (p)  Text Page  Vocera        Assessment & Plan New actions/orders today (06/30/2023) are underlined. All lab results in the assessment and plan were reviewed.    Enrique Dumas is a 67 year old male with history including HTN; HLD; and congenital Solomon; who presented 6/27/2023 with right leg swelling with recent back pain. 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     On initially evaluation, pt was afebrile, hemodynamically stable, not hypoxic. ECG showed SR with NSTWA inferior limb leads, no acute ischemic changes. Labs notable for CBC normal; BMP with sodium 135, potassium 2.7; LFT's and lipase normal; trop negative; d-dimer 0.69; UA negative for signs of infection.    Right lower extremity US 6/28 negative for DVT.     CT CAP 6/28 showed no evidence for pulmonary emboli; proximal gastric wall thickening concerning for an infiltrative or neoplastic process which included a soft tissue mass along the posterior margin of the proximal stomach confluent with the adjacent spleen and pancreatic tail; sclerotic changes involving the T10 -T12 vertebral bodies with destructive changes of T12 and abnormal surrounding paravertebral soft tissue extending into the posterior mediastinum, findings also concerning for a neoplastic process; indeterminate 9 mm pulmonary nodule right lung apex could represent a metastatic lesion; hypodense right hepatic lobe lesion concerning for metastasis. Splenic metastasis also possible.    MRI C-spine and L-spine 6/27 negative for acute findings. MRI T-spine 6/27 showed diffuse infiltrative process of the bone marrow of the thoracic spine both anteriorly and posteriorly with extraosseous extension a diffuse involvement of the neural foramen consistent with diffuse  metastatic disease; extraosseous extension leads canal compromise and neural foraminal narrowing from the T8 to the T10-T11 disc space level; no abnormal signal thoracic spinal cord; prominent involvement of the posterior ribs in the thoracic region with extension into the soft tissues.      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  RLE pain and swelling, suspect related to above.  Back pain and abdominal pain related to above.  * Initial presentation and imaging as above. Pt presented with right lower extremity edema but had been having back pain since the beginning of the year; also had c/o abdominal pain. GI and Oncology consulted on admit. Started on IV pantoprazole on admit.  * On 6/28, pt c/o worsened right thigh pain and swelling. CT thigh did not show any acute findings. Underwent EGD that showed normal esophagus; enlarged gastric folds with hemorrhage, biopsied; erosive gastropathy with no bleeding and no stigmata of recent bleeding; normal duodenal bulb, first portion of the duodenum and second portion of the duodenum. 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 Seen by Oncology and was started on dexamethasone.   * On 6/29, still with back pain, but thigh  pain and swelling improved. Seen by Radiation Oncology and started on palliative spine radiation. Started scheduled acetaminophen and lidocaine patch.  * On 6/30, symptoms improved.  - Follow-up path results - Dr. Lord reaching out to Pathologist.  - Continue palliative spine radiation 2 out of 5 today per Radiation Oncology (no treatments on weekend, so next on 7/3.  - Oncology following, plan palliative chemotherapy and outpatient PET scan pending path results.  - Continue dexamethasone 4 mg q12h.  - Continue acetaminophen 1000 mg TID and lidocaine patch.  - Continue PRN oxycodone 5-10 mg q4h. PRN cyclobenzaprine and PRN IV hydromorphone; minimize opioids as able.  - Continue to keep right lower extremity elevated.  - Appreciate consultant help.    Erosive gastropathy on EGD.  * Initial presentation as above. Started on pantoprazole on admit.  * EGD 6/28 as above.  - Continue pantoprazole .    Urinary retention, question from immobility, possible BPH.  Hematuria, suspect related to catheter trauma.  * On 6/28, pt noted with urinary retention requiring multiple SIC.  * On 6/29, started on tamsulosin. Continued to have retention with pain/discomfort with multiple straight catheterizations and Downing placed. Subsequently had bloody urine noted. Urology consulted.  - Continue Downing for 10-14d (Urology advised TOV no sooner than 7/5).  - Continue PRN irrigation.  - Continue tamsulosin.  - Follow-up with [facility] outpatient, appreciate help.    Indeterminate pulmonary nodule.  * Initial presentation as above. CT also showed an indeterminate 9 mm pulmonary nodule right lung apex, could represent a metastatic lesion.  - PET CT outpatient as above.    Weakness and physical deconditioning due to multiple acute medical issues.  * Pt with significant weakness with difficulty walking due to above issues.  * Pt had an assisted fall and PT and OT consulted 6/28.  - Continue PT and OT.  - Continue to treat other issues as  "noted.    Hypertension (benign essential).  [PTA: valsartan-HCTZ 320-25 mg daily.]  * Valsartan-HCTZ held on admit due to hypokalemia.  - Continue to hold valsartan-HCTZ.    Hyponatremia, suspect nutritional/hypovolemia.  * Sodium 135 on admit. Pt started on LR on admit.  * IVF's completed 6/28.  Recent Labs   Lab 06/30/23  0752 06/29/23  0727 06/28/23  0816 06/27/23  1856   * 134* 132* 135*   - Continue to monitor BMP.    Hypokalemia.  * Potassium low on admit, suspect due to decreased PO intake and diuretic. PTA diuretic held on admit.  Recent Labs   Lab 06/30/23  0752 06/29/23  0727 06/28/23  0816 06/27/23  1856   POTASSIUM 3.5 3.6 3.6 2.7*   - Continue PRN K replacement.    Hyperlipidemia/dyslipidemia.  - Continue atorvastatin.      Clinically Significant Risk Factors                         # Obesity: Estimated body mass index is 31.58 kg/m  as calculated from the following:    Height as of this encounter: 1.803 m (5' 11\").    Weight as of this encounter: 102.7 kg (226 lb 6.6 oz)., PRESENT ON ADMISSION            COVID-19 testing.  COVID-19 PCR Results        9/20/2021    10:02   COVID-19 PCR Results   COVID-19 Virus by PCR (External Result) Negative        Details          This result is from an external source.         COVID-19 Antibody Results, Testing for Immunity         No data to display                Diet: Mechanical/Dental Soft Diet  Room Service  Snacks/Supplements Adult: Other; pt may order supplements prn; With Meals  Snacks/Supplements Adult: Other; vanilla [place] packet with dinner meal; With Meals    Prophylaxis: PCD's, ambulation.   Downing Catheter: PRESENT, indication: Retention  Lines: None     Code Status: Full Code    Disposition Plan   Expected discharge: 3-4d recommended to prior living arrangement pending continued radiation treatments, improved mobility.  Entered: Rohan Nickerson MD 06/30/2023, 1:11 PM       Communication.  - I d/w pt's wife 6/30.    Interval History  " "  Doing better today.  Pain and mobility better.    -Data reviewed today: I reviewed all new labs and imaging over the last 24 hours. I personally reviewed no images or EKG's today.    Physical Exam    , Blood pressure 130/60, pulse 92, temperature 97.4  F (36.3  C), temperature source Oral, resp. rate 18, height 1.803 m (5' 11\"), weight 102.7 kg (226 lb 6.6 oz), SpO2 96 %. O2 Device: None (Room air)    Vitals:    06/27/23 1845 06/28/23 1659   Weight: 104.3 kg (230 lb) 102.7 kg (226 lb 6.6 oz)     Vital Signs with Ranges  Temp:  [97.4  F (36.3  C)-98.1  F (36.7  C)] 97.4  F (36.3  C)  Pulse:  [92-99] 92  Resp:  [18] 18  BP: (121-130)/(60-64) 130/60  SpO2:  [92 %-96 %] 96 %  Patient Vitals for the past 24 hrs:   BP Temp Temp src Pulse Resp SpO2   06/30/23 0737 130/60 97.4  F (36.3  C) Oral 92 18 96 %   06/29/23 1544 121/64 98.1  F (36.7  C) Oral 99 -- 92 %     I/O's Last 24 hours  I/O last 3 completed shifts:  In: -   Out: 1875 [Urine:1875]    Constitutional: Awake, alert, oriented, pleasant, conversant.  Respiratory: Diminished in bases. No crackles or wheezes.  Cardiovascular: RRR, no m/r/g.  GI:   Skin/Integumen:   Other:        Data    Labs reviewed.  Recent Labs   Lab 06/30/23  0752 06/29/23  0727 06/28/23  0816 06/27/23  1856   WBC  --  6.8 7.0 7.9   HGB  --  15.3 14.7 13.8   MCV  --  87 87 85   PLT  --  261 244 246   * 134* 132* 135*   POTASSIUM 3.5 3.6 3.6 2.7*   CHLORIDE 96* 96* 94* 98   CO2 26 23 26 24   BUN 20.0 14.1 17.6 17.3   CR 0.66* 0.65* 0.81 0.74   ANIONGAP 13 15 12 13   ESTEFANY 9.5 9.9 9.4 8.9   * 122* 111* 96   ALBUMIN  --   --  4.1 4.0   PROTTOTAL  --   --  7.1 6.7   BILITOTAL  --   --  0.7 0.3   ALKPHOS  --   --  91 82   ALT  --   --  19 18   AST  --   --  21 19   LIPASE  --   --   --  21     Recent Labs   Lab Test 06/27/23  1856   TROPONIN T HIGH SENSITIVITY 7     Recent Labs   Lab 06/30/23  0752 06/29/23  0727 06/28/23  0816 06/27/23  1856   * 122* 111* 96     No lab results " found.    No results for input(s): INR, [id number] in the last 168 hours.  Recent Labs   Lab 06/29/23  0727 06/28/23  0816 06/27/23  1856   WBC 6.8 7.0 7.9   DD  --   --  0.69*       MICRO:  CULTURES (INCLUDING BLOOD AND URINE):  No lab results found in last 7 days.    No results found for this or any previous visit (from the past 24 hour(s)).    Medications   All medications were reviewed.    Infusions:    Scheduled Medications:    acetaminophen  1,000 mg Oral TID     atorvastatin  10 mg Oral Daily     dexamethasone  4 mg Oral Q12H MADHAVI (08/20)     lidocaine  1 patch Transdermal Q24H     lidocaine   Transdermal Q8H MADHAVI     pantoprazole  40 mg Oral BID AC     sodium chloride (PF)  3 mL Intracatheter Q8H     tamsulosin  0.4 mg Oral Daily     PRN Medications:  benzonatate, cyclobenzaprine, guaiFENesin-dextromethorphan, HYDROmorphone, lidocaine 4%, lidocaine (buffered or not buffered), melatonin, naloxone **OR** naloxone **OR** naloxone **OR** naloxone, ondansetron **OR** ondansetron, oxyCODONE, simethicone, sodium chloride (PF)

## 2023-06-30 NOTE — PROGRESS NOTES
Notified provider about indwelling wick catheter discussed removal or continued need.    Did provider choose to remove indwelling wick catheter? NO    Provider's wick indication for keeping indwelling wick catheter: Indication for continued use: Retention    Is there an order for indwelling wick catheter? YES    Per urology note on 6/30, wick to remain 10-14 days.

## 2023-06-30 NOTE — PROGRESS NOTES
Patient received 2nd treatment to his Thoracic spine today. Dose given was 400 cGy with 10 mv photons for a total dose to date of 800 cGy.    Janeen MELGOZA

## 2023-06-30 NOTE — CONSULTS
"[facility] Inpatient Consultation Note    Enrique Dumas MRN# [id number]   Age: 67 year old YOB: 1955     Date of Admission:  6/27/2023    Reason for consult: Gross hematuria, urinary retention        Requesting physician: Dr Nickerson                   History of Present Illness:     68 yo M being worked up for likely metastatic malignancy, likely GI source, now starting palliative radiation and chemo.      Noted to be retaining urine 2 days ago with elevated PVRs between 700 ml - 1 L, only passing small volumes of urine. Reports he could feel the pressure and urge to void but unable to empty. Straight cath'd several times until this became uncomfortable for patient, so indwelling wick was placed yesterday. Started on Flomax.      Denies trouble urinating prior to admission. No established care with urology -- [facility]". Trouble with urination began around the same time it became difficult to walk due to back pain - though this morning patient reports he can move his legs much better today after just one session of palliative radiation; feeling optimistic regarding this improvement.     Gross hematuria noted after wick placement yd, which gradually improved throughout the day. Wick now draining clear yellow urine this morning. Patient reports he felt a straight cath \"scrape\" him and believes this is what caused the bleeding. No prior h/o gross hematuria. Patient not on any blood thinners. Cr WNL. UA from 6/27 WNL.     CT chest/abd/pelv 6/27 from  perspective showing no stones or hydronephrosis; enlarged prostate.     PSA from last fall = 0.75 ng/mL. PSA ordered yesterday and now 3.82 in setting of multiple catheterizations.             Past Medical History:   History reviewed. No pertinent past medical history.          Past Surgical History:     Past Surgical History:   Procedure Laterality Date     ESOPHAGOSCOPY, GASTROSCOPY, DUODENOSCOPY (EGD), COMBINED N/A 6/28/2023 "    Procedure: Endoscopic ultrasound upper gastrointestinal tract (GI);  Surgeon: Emory Gaston MD;  Location:  GI     ESOPHAGOSCOPY, GASTROSCOPY, DUODENOSCOPY (EGD), COMBINED N/A 6/28/2023    Procedure: Esophagoscopy, gastroscopy, duodenoscopy (EGD), combined;  Surgeon: Emory Gaston MD;  Location:  GI             Social History:     Social History     Socioeconomic History     Marital status:      Spouse name: Not on file     Number of children: Not on file     Years of education: Not on file     Highest education level: Not on file   Occupational History     Not on file   Tobacco Use     Smoking status: Not on file     Smokeless tobacco: Not on file   Substance and Sexual Activity     Alcohol use: Not on file     Drug use: Not on file     Sexual activity: Not on file   Other Topics Concern     Not on file   Social History Narrative     Not on file     Social Determinants of Health     Financial Resource Strain: Not on file   Food Insecurity: Not on file   Transportation Needs: Not on file   Physical Activity: Not on file   Stress: Not on file   Social Connections: Not on file   Intimate Partner Violence: Not on file   Housing Stability: Not on file             Family History:   History reviewed. No pertinent family history.          Immunizations:     Immunization History   Administered Date(s) Administered     COVID-19 Bivalent 12+ (Pfizer) 09/23/2022     COVID-19 Monovalent 18+ (Moderna) 07/09/2021, 08/06/2021             Allergies:   No Known Allergies          Medications:     Current Facility-Administered Medications   Medication     acetaminophen (TYLENOL) tablet 1,000 mg     atorvastatin (LIPITOR) tablet 10 mg     benzonatate (TESSALON) capsule 100 mg     cyclobenzaprine (FLEXERIL) tablet 5-10 mg     dexamethasone (DECADRON) tablet 4 mg     guaiFENesin-dextromethorphan (ROBITUSSIN DM) 100-10 MG/5ML syrup 10 mL     HYDROmorphone (PF) (DILAUDID) injection 0.3 mg     Lidocaine  "(LIDOCARE) 4 % Patch 1 patch     lidocaine (LMX4) cream     lidocaine 1 % 0.1-1 mL     lidocaine patch in PLACE     melatonin tablet 1 mg     naloxone (NARCAN) injection 0.2 mg    Or     naloxone (NARCAN) injection 0.4 mg    Or     naloxone (NARCAN) injection 0.2 mg    Or     naloxone (NARCAN) injection 0.4 mg     ondansetron (ZOFRAN ODT) ODT tab 4 mg    Or     ondansetron (ZOFRAN) injection 4 mg     oxyCODONE (ROXICODONE) tablet 5-10 mg     pantoprazole (PROTONIX) EC tablet 40 mg     simethicone (MYLICON) chewable tablet 80 mg     sodium chloride (PF) 0.9% PF flush 3 mL     sodium chloride (PF) 0.9% PF flush 3 mL     tamsulosin (FLOMAX) capsule 0.4 mg             Review of Systems:   Comprehensive review of systems from the Admission note dated 6/27/23 at [facility] was reviewed with no changes except per HPI.     Examination:  /60 (BP Location: Left arm)   Pulse 92   Temp 97.4  F (36.3  C) (Oral)   Resp 18   Ht 1.803 m (5' 11\")   Wt 102.7 kg (226 lb 6.6 oz)   SpO2 96%   BMI 31.58 kg/m    General: Alert and oriented, no distress  HEENT: Face symmetric, mucous membranes moist and pink  Respiratory: Breathing unlabored, no audible wheezing  Cardiac: Extremities warm and well perfused  : wick draining clear yellow urine  Extremities: wiggling legs in bed, reports improvement from yesterday  Pysch: Normal mood and affect  Skin: No evident rashes or lesions            Data:     Lab Results   Component Value Date    WBC 6.8 06/29/2023     Lab Results   Component Value Date    RBC 5.16 06/29/2023     Lab Results   Component Value Date    HGB 15.3 06/29/2023     Lab Results   Component Value Date    HCT 45.0 06/29/2023     Lab Results   Component Value Date     06/29/2023     Creatinine   Date Value Ref Range Status   06/30/2023 0.66 (L) 0.67 - 1.17 mg/dL Final   ]  Lab Results   Component Value Date    BUN 20.0 06/30/2023       Imaging:  CT Chest Abd Pelv W " 6/27/23                                                              IMPRESSION:  1.  No evidence for pulmonary emboli.  2.  Mosaic attenuation both lower lobes suggesting air trapping.  3.  No renal calculi or hydronephrosis.  4.  Proximal gastric wall thickening concerning for an infiltrative or neoplastic process. This includes a soft tissue mass along the posterior margin of the proximal stomach confluent with the adjacent spleen and pancreatic tail. GI consultation   recommended.  5.  Sclerotic changes involving the T10 -T12 vertebral bodies with destructive changes of T12 and abnormal surrounding paravertebral soft tissue extending into the posterior mediastinum. Findings also concerning for a neoplastic process. MRI of the spine   recommended for further evaluation.  6.  Indeterminate 9 mm pulmonary nodule right lung apex could represent a metastatic lesion.  7.  Hypodense right hepatic lobe lesion concerning for metastasis. Splenic metastasis also possible.    Impression:  68yo M with urinary retention and gross hematuria (resolved).    Plan:  - PSA reassuring, expect some elevation in setting of catheterization, no concern for prostate cancer at this time  - Prior gross hematuria very likely d/t catheter trauma, now resolving. CT reviewed and reassuring, consistent with BPH. Recommend cystoscopy at some point as outpatient, can be closer to home.   - Large volume retention, likely multifactorial d/t BPH and immobility. Recommend continuing wick 10-14 days due to large volume retention; allow for bladder decompression and hopeful for improvement in mobility in this period. Continue Flomax; may be helpful long term medication given his enlarged prostate.       - IF patient remains inpatient into next week, could be reasonable to attempt TOV prior to discharge as soon as 7/5 but would advise against any sooner than this   - Pending length of admission, likely discharge with wick and further workup with TOV  and cystoscopy as outpatient. [facility] information added to AVS as option, though patient expresses interest in established urologic care with [facility].     Urology will sign off for now. Please call with any questions or concerns.     Miranda Bello PA-C  [facility]   Pager: [phone]  Office: [phone]

## 2023-06-30 NOTE — CONSULTS
"BRIEF NUTRITION ASSESSMENT      REASON FOR ASSESSMENT:  Enrique Dumas is a 67 year old male seen by Registered Dietitian for Admission Nutrition Risk Screen:  Have you recently lost weight without trying? \"14-23#\"  Have you been eating poorly because of a decreased appetite? 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  I am saving the yogurt for later this morning.\"  Pt loves the Ensure supplements - \"if I could afford these, I would drink one every day\"  States that he drinks a lot of milk at home        ANTHROPOMETRICS:  Height: 5' 11\"  Weight: (6/28) 102.7 kg / 226 lbs 6.6 oz  Body mass index is 31.58 kg/m .   Weight Status: Obesity Grade I BMI 30-34.9  IBW:  78.2 kg  %IBW: 131%  Weight History:   Wt Readings from Last 10 Encounters:   06/28/23 : 102.7 kg (226 lb 6.6 oz)     9/23/22: 248# (annual exam)     Down 22# from last Fall (9%) - likely in past 6 months (reports that is when sxs started)    LABS:  Labs noted    MALNUTRITION:  Moderate malnutrition in the context of acute illness or injury     % Weight Loss:  Up to 10% in 6 months (moderate malnutrition) - 9% in the past 6 months  % Intake:  <75% for >/= 3 months (moderate malnutrition)  Subcutaneous Fat Loss:  None observed  Muscle Loss:  None observed  Fluid Retention:  None noted    NUTRITION INTERVENTION:  Nutrition Diagnosis:  No nutrition diagnosis at this time.    Implementation:  Nutrition Education ---> Reviewed diet order  Discussed various supplements pt may purchase for home  Encouraged good po " intake for recovery  Will send a [place] Breakfast supplement with dinner for pt to sample    FOLLOW UP/MONITORING:   Will re-evaluate in 7 - 10 days, or sooner, if re-consulted.

## 2023-06-30 NOTE — PLAN OF CARE
Goal Outcome Evaluation:    A/Ox4, VSS on RA. [employer]. PRN dilaudid given 1x before radiation treatment this afternoon, otherwise no complaints of pain. Up A2 + sarasteady, not oob this shift. Prefers to sit at edge of bed. Tolerating mechanical soft diet. Downing in place w/ adequate UOP - draining clear yellow. Urology signed off this morning. No BM. Reports slight numbness/tingling in feet. K WNL - monitor AM recheck. Plan to work with PT this afternoon.

## 2023-06-30 NOTE — PROGRESS NOTES
"SPIRITUAL HEALTH SERVICES Progress Note  [facility] MedSurg/Oncology 88    Saw pt Enrique per follow-up care. His wife, Erika, and their son were present at bedside.      Patient/Family Understanding of Illness and Goals of Care - Not discussed. Though, Enrique named is goal as \"to never give up.\"      Strengths, Coping, and Resources - Enrique and Erika reflected on Enrique's strength of will and strength of pipo. Enrique named that he likes to make things and credits his resilience and perseverance to growing up on a farm. He shared several stories illustrating his determination and philosophy of not giving up. Parcoal is Enrique's favorite part of the day. Every morning, he walks to his mother's house to start the day--no matter the weather. He shared, \"If someone tells me it can't be done, I find another path.\" He named living by this practice and is approaching his current health needs similiarly, he shared.      Meaning, Beliefs, and Spirituality - Enrique and Erika are long-time members of [facility] in [facility]. Erika serves on the missions committee. The family welcomed prayer, and the four of us prayed together.      Plan of Care - Will continue to follow for spiritual/emotional support. Please contact Spiritual Health as needs arise.    Nga Stafford  Associate   [facility] Spiritual Health Phone Line: [phone]   [facility] Pager: [phone]  "

## 2023-07-01 LAB — POTASSIUM SERPL-SCNC: 3.9 MMOL/L (ref 3.4–5.3)

## 2023-07-01 PROCEDURE — 36415 COLL VENOUS BLD VENIPUNCTURE: CPT | Performed by: INTERNAL MEDICINE

## 2023-07-01 PROCEDURE — 250N000013 HC RX MED GY IP 250 OP 250 PS 637: Performed by: INTERNAL MEDICINE

## 2023-07-01 PROCEDURE — 250N000013 HC RX MED GY IP 250 OP 250 PS 637: Performed by: PHYSICIAN ASSISTANT

## 2023-07-01 PROCEDURE — 77412 RADIATION TX DELIVERY LVL 3: CPT

## 2023-07-01 PROCEDURE — 120N000001 HC R&B MED SURG/OB

## 2023-07-01 PROCEDURE — 99232 SBSQ HOSP IP/OBS MODERATE 35: CPT | Performed by: INTERNAL MEDICINE

## 2023-07-01 PROCEDURE — 84132 ASSAY OF SERUM POTASSIUM: CPT | Performed by: INTERNAL MEDICINE

## 2023-07-01 PROCEDURE — 77387 GUIDANCE FOR RADJ TX DLVR: CPT

## 2023-07-01 PROCEDURE — 250N000013 HC RX MED GY IP 250 OP 250 PS 637: Performed by: HOSPITALIST

## 2023-07-01 PROCEDURE — 250N000011 HC RX IP 250 OP 636: Performed by: INTERNAL MEDICINE

## 2023-07-01 PROCEDURE — 250N000012 HC RX MED GY IP 250 OP 636 PS 637: Performed by: INTERNAL MEDICINE

## 2023-07-01 RX ORDER — BISACODYL 10 MG
10 SUPPOSITORY, RECTAL RECTAL DAILY PRN
Status: DISCONTINUED | OUTPATIENT
Start: 2023-07-01 | End: 2023-07-10 | Stop reason: HOSPADM

## 2023-07-01 RX ADMIN — DEXAMETHASONE 4 MG: 4 TABLET ORAL at 20:34

## 2023-07-01 RX ADMIN — BISACODYL 10 MG: 10 SUPPOSITORY RECTAL at 00:32

## 2023-07-01 RX ADMIN — TAMSULOSIN HYDROCHLORIDE 0.4 MG: 0.4 CAPSULE ORAL at 09:27

## 2023-07-01 RX ADMIN — ACETAMINOPHEN 1000 MG: 500 TABLET ORAL at 21:56

## 2023-07-01 RX ADMIN — HYDROMORPHONE HYDROCHLORIDE 0.3 MG: 1 INJECTION, SOLUTION INTRAMUSCULAR; INTRAVENOUS; SUBCUTANEOUS at 08:11

## 2023-07-01 RX ADMIN — ATORVASTATIN CALCIUM 10 MG: 10 TABLET, FILM COATED ORAL at 09:26

## 2023-07-01 RX ADMIN — PANTOPRAZOLE SODIUM 40 MG: 40 TABLET, DELAYED RELEASE ORAL at 15:56

## 2023-07-01 RX ADMIN — PANTOPRAZOLE SODIUM 40 MG: 40 TABLET, DELAYED RELEASE ORAL at 09:27

## 2023-07-01 RX ADMIN — POLYETHYLENE GLYCOL 3350 17 G: 17 POWDER, FOR SOLUTION ORAL at 09:26

## 2023-07-01 RX ADMIN — DEXAMETHASONE 4 MG: 4 TABLET ORAL at 09:27

## 2023-07-01 RX ADMIN — ACETAMINOPHEN 1000 MG: 500 TABLET ORAL at 15:56

## 2023-07-01 RX ADMIN — ACETAMINOPHEN 1000 MG: 500 TABLET ORAL at 09:26

## 2023-07-01 ASSESSMENT — ACTIVITIES OF DAILY LIVING (ADL)
ADLS_ACUITY_SCORE: 33
ADLS_ACUITY_SCORE: 34
ADLS_ACUITY_SCORE: 33
ADLS_ACUITY_SCORE: 33
ADLS_ACUITY_SCORE: 34
ADLS_ACUITY_SCORE: 33
ADLS_ACUITY_SCORE: 33
ADLS_ACUITY_SCORE: 35
ADLS_ACUITY_SCORE: 33
ADLS_ACUITY_SCORE: 34
ADLS_ACUITY_SCORE: 34
ADLS_ACUITY_SCORE: 33

## 2023-07-01 NOTE — PROGRESS NOTES
[facility]    Internal Medicine Hospitalist Progress Note  07/01/2023  I evaluated patient on the above date.            Assessment & Plan     Enrique Dumas is a 67 year old male with history including HTN; HLD; and congenital [place]; who presented 6/27/2023 with right leg swelling with recent back pain. 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     On initially evaluation, pt was afebrile, hemodynamically stable, not hypoxic. ECG showed SR with NSTWA inferior limb leads, no acute ischemic changes. Labs notable for CBC normal; BMP with sodium 135, potassium 2.7; LFT's and lipase normal; trop negative; d-dimer 0.69; UA negative for signs of infection.    Right lower extremity US 6/28 negative for DVT.     CT CAP 6/28 showed no evidence for pulmonary emboli; proximal gastric wall thickening concerning for an infiltrative or neoplastic process which included a soft tissue mass along the posterior margin of the proximal stomach confluent with the adjacent spleen and pancreatic tail; sclerotic changes involving the T10 -T12 vertebral bodies with destructive changes of T12 and abnormal surrounding paravertebral soft tissue extending into the posterior mediastinum, findings also concerning for a neoplastic process; indeterminate 9 mm pulmonary nodule right lung apex could represent a metastatic lesion; hypodense right hepatic lobe lesion concerning for metastasis. Splenic metastasis also possible.    MRI C-spine and L-spine 6/27 negative for acute findings. MRI T-spine 6/27 showed diffuse infiltrative process of the bone marrow of the thoracic spine both anteriorly and posteriorly with extraosseous extension a diffuse involvement of the neural foramen consistent with diffuse metastatic disease; extraosseous extension leads canal compromise and neural foraminal narrowing from the T8 to the T10-T11 disc space level; no abnormal signal thoracic  spinal cord; prominent involvement of the posterior ribs in the thoracic region with extension into the soft tissues.      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  RLE pain and swelling, suspect related to above.  Back pain and abdominal pain related to above.  * Initial presentation and imaging as above. Pt presented with right lower extremity edema but had been having back pain since the beginning of the year; also had c/o abdominal pain. GI and Oncology consulted on admit. Started on IV pantoprazole on admit.  * On 6/28, pt c/o worsened right thigh pain and swelling. CT thigh did not show any acute findings. Underwent EGD that showed normal esophagus; enlarged gastric folds with hemorrhage, biopsied; erosive gastropathy with no bleeding and no stigmata of recent bleeding; normal duodenal bulb, first portion of the duodenum and second portion of the duodenum. 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 Seen by Oncology and was started on dexamethasone.   * On 6/29, still with back pain, but thigh pain and swelling improved. Seen by Radiation Oncology and started on palliative spine radiation. Started scheduled acetaminophen and lidocaine patch.  * On 6/30,  symptoms improved.  - Follow-up path results - Dr. Lord reaching out to Pathologist.  - Continue palliative spine radiation 3 out of 5 today per Radiation Oncology   - Oncology following, plan palliative chemotherapy and outpatient PET scan pending path results.  - Continue dexamethasone 4 mg q12h.  - Continue acetaminophen 1000 mg TID and lidocaine patch.  - Continue PRN oxycodone 5-10 mg q4h. PRN cyclobenzaprine and PRN IV hydromorphone; minimize opioids as able.  - Continue to keep right lower extremity elevated.  - Appreciate consultant help.    Erosive gastropathy on EGD.  * Initial presentation as above. Started on pantoprazole on admit.  * EGD 6/28 as above.  - Continue pantoprazole .    Urinary retention, question from immobility, possible BPH.  Hematuria, suspect related to catheter trauma.  * On 6/28, pt noted with urinary retention requiring multiple SIC.  * On 6/29, started on tamsulosin. Continued to have retention with pain/discomfort with multiple straight catheterizations and Downing placed. Subsequently had bloody urine noted. Urology consulted.  - Continue Downing for 10-14d (Urology advised TOV no sooner than 7/5).  - Continue PRN irrigation.  - Continue tamsulosin.  - Follow-up with [facility] outpatient, appreciate help.    Indeterminate pulmonary nodule.  * Initial presentation as above. CT also showed an indeterminate 9 mm pulmonary nodule right lung apex, could represent a metastatic lesion.  - PET CT outpatient as above.    Weakness and physical deconditioning due to multiple acute medical issues.  * Pt with significant weakness with difficulty walking due to above issues.  * Pt had an assisted fall and PT and OT consulted 6/28.  - Continue PT and OT.  - Continue to treat other issues as noted.    Hypertension (benign essential).  [PTA: valsartan-HCTZ 320-25 mg daily.]  * Valsartan-HCTZ held on admit due to hypokalemia.  - Continue to hold valsartan-HCTZ.    Hyponatremia, suspect  "nutritional/hypovolemia.  * Sodium 135 on admit. Pt started on LR on admit.  * IVF's completed 6/28.  Recent Labs   Lab 06/30/23  0752 06/29/23  0727 06/28/23  0816 06/27/23  1856   * 134* 132* 135*   - Continue to monitor BMP.    Hypokalemia.  * Potassium low on admit, suspect due to decreased PO intake and diuretic. PTA diuretic held on admit.  Recent Labs   Lab 07/01/23  0650 06/30/23  0752 06/29/23  0727 06/28/23  0816 06/27/23  1856   POTASSIUM 3.9 3.5 3.6 3.6 2.7*   - Continue PRN K replacement.    Hyperlipidemia/dyslipidemia.  - Continue atorvastatin.      Clinically Significant Risk Factors                         # Obesity: Estimated body mass index is 31.58 kg/m  as calculated from the following:    Height as of this encounter: 1.803 m (5' 11\").    Weight as of this encounter: 102.7 kg (226 lb 6.6 oz)., PRESENT ON ADMISSION            COVID-19 testing.  COVID-19 PCR Results        9/20/2021    10:02   COVID-19 PCR Results   COVID-19 Virus by PCR (External Result) Negative        Details          This result is from an external source.         COVID-19 Antibody Results, Testing for Immunity         No data to display                Diet: Mechanical/Dental Soft Diet  Room Service  Snacks/Supplements Adult: Other; pt may order supplements prn; With Meals  Snacks/Supplements Adult: Other; vanilla [place] packet with dinner meal; With Meals    Prophylaxis: PCD's, ambulation.   Downing Catheter: PRESENT, indication: Retention  Lines: None     Code Status: Full Code    Disposition Plan   Expected discharge: 3-4d recommended to prior living arrangement pending continued radiation treatments, improved mobility.  Entered: Maria Guadalupe Clemente MD 07/01/2023, 10:01 AM       Communication.  - I d/w pt's wife 7/1/23 .    Interval History    Doing better today.  Pain and mobility better.  Still having hard time walking might need TCU on discharge.  Patient had a BM yesterday.  Denies any acute new complaints.    -Data " "reviewed today: I reviewed all new labs and imaging over the last 24 hours. I personally reviewed no images or EKG's today.    Physical Exam    , Blood pressure 105/61, pulse 91, temperature 97.6  F (36.4  C), temperature source Oral, resp. rate 16, height 1.803 m (5' 11\"), weight 102.7 kg (226 lb 6.6 oz), SpO2 96 %. O2 Device: None (Room air)    Vitals:    06/27/23 1845 06/28/23 1659   Weight: 104.3 kg (230 lb) 102.7 kg (226 lb 6.6 oz)     Vital Signs with Ranges  Temp:  [97.1  F (36.2  C)-97.6  F (36.4  C)] 97.6  F (36.4  C)  Pulse:  [82-91] 91  Resp:  [16] 16  BP: (105-115)/(61-64) 105/61  SpO2:  [96 %-99 %] 96 %  Patient Vitals for the past 24 hrs:   BP Temp Temp src Pulse Resp SpO2   07/01/23 0729 105/61 97.6  F (36.4  C) Oral 91 16 96 %   06/30/23 2344 115/64 97.1  F (36.2  C) Oral 82 16 99 %     I/O's Last 24 hours  I/O last 3 completed shifts:  In: 480 [P.O.:480]  Out: 1550 [Urine:1550]    Constitutional: Awake, alert, oriented, pleasant, conversant.  Respiratory: Diminished in bases. No crackles or wheezes.  Cardiovascular: RRR, no m/r/g.  GI:   Skin/Integumen:   Other:        Data    Labs reviewed.  Recent Labs   Lab 07/01/23  0650 06/30/23  0752 06/29/23  0727 06/28/23  0816 06/27/23  1856   WBC  --   --  6.8 7.0 7.9   HGB  --   --  15.3 14.7 13.8   MCV  --   --  87 87 85   PLT  --   --  261 244 246   NA  --  135* 134* 132* 135*   POTASSIUM 3.9 3.5 3.6 3.6 2.7*   CHLORIDE  --  96* 96* 94* 98   CO2  --  26 23 26 24   BUN  --  20.0 14.1 17.6 17.3   CR  --  0.66* 0.65* 0.81 0.74   ANIONGAP  --  13 15 12 13   ESTEFANY  --  9.5 9.9 9.4 8.9   GLC  --  114* 122* 111* 96   ALBUMIN  --   --   --  4.1 4.0   PROTTOTAL  --   --   --  7.1 6.7   BILITOTAL  --   --   --  0.7 0.3   ALKPHOS  --   --   --  91 82   ALT  --   --   --  19 18   AST  --   --   --  21 19   LIPASE  --   --   --   --  21     Recent Labs   Lab Test 06/27/23  1856   TROPONIN T HIGH SENSITIVITY 7     Recent Labs   Lab 06/30/23  0752 06/29/23  0727 " 06/28/23  0816 06/27/23  1856   * 122* 111* 96     No lab results found.    No results for input(s): INR, [id number] in the last 168 hours.  Recent Labs   Lab 06/29/23  0727 06/28/23  0816 06/27/23  1856   WBC 6.8 7.0 7.9   DD  --   --  0.69*       MICRO:  CULTURES (INCLUDING BLOOD AND URINE):  No lab results found in last 7 days.    No results found for this or any previous visit (from the past 24 hour(s)).    Medications   All medications were reviewed.    Infusions:    Scheduled Medications:    acetaminophen  1,000 mg Oral TID     atorvastatin  10 mg Oral Daily     dexamethasone  4 mg Oral Q12H MADHAVI (08/20)     lidocaine  1 patch Transdermal Q24H     lidocaine   Transdermal Q8H MADHAVI     pantoprazole  40 mg Oral BID AC     polyethylene glycol  17 g Oral Daily     senna-docusate  1 tablet Oral At Bedtime     sodium chloride (PF)  3 mL Intracatheter Q8H     tamsulosin  0.4 mg Oral Daily     PRN Medications:  benzonatate, bisacodyl, cyclobenzaprine, guaiFENesin-dextromethorphan, HYDROmorphone, lidocaine 4%, lidocaine (buffered or not buffered), melatonin, naloxone **OR** naloxone **OR** naloxone **OR** naloxone, ondansetron **OR** ondansetron, oxyCODONE, simethicone, sodium chloride (PF)

## 2023-07-01 NOTE — PLAN OF CARE
Goal Outcome Evaluation:       Pt is A&Ox 4. VSS on RA. Soft Mechanical Diet. Ax 2 with gait belt & Tricia Steady. [place]. Right Ear completely deaf. Left uses hearing aid and reads lips. K+ Protocol 3.5 yesterday will recheck this am. Downing in place for retention. R PIV Sl 'd. Denies pain. Denies N/V. Suppository given around 12:30 am for constipation. Pt did have Large BM this shift. Edema noted in legs and ankles. Biopsy results from Stomach pending. Discharge pending improvement.

## 2023-07-01 NOTE — PROGRESS NOTES
Radiation therapy treatment #3 of 5. Pt received 400 cGy with 10 MV photon radiation to the thoracic spine for a total dose of 1200 cGy given to date. Plan for 2 more treatments.

## 2023-07-01 NOTE — PLAN OF CARE
A&Ox4. VSS on RA. Pain while lying flat, 1x IV dilaudid given prior to radiation treatment.  PIV SL. Pt [place], reads lips.Up A2 with GB and Tricia Gibson. Downing in for retention. Tolerating mechanical dental soft diet.  Takes pills whole. Discharge pending.

## 2023-07-01 NOTE — PROGRESS NOTES
A/Ox4, VSS on RA. [place].  VSS on Room Air.  PRN dilaudid given 1x before bed when patient complained of abdominal pain.  Up A2 + binu steady with PT.   UP with Assist of 2 + GB/W and pivots to BSC.   Otherwise  not oob this shift.   Prefers to sit at edge of bed.  Refused bed alarm.  Tolerating mechanical soft diet but complains of constipation.  Given miralax and senna this evening - no BM yet.   Downing in place w/ adequate UOP - draining clear yellow.   Reports slight numbness/tingling in feet. K WNL - monitor AM recheck.

## 2023-07-01 NOTE — PROGRESS NOTES
Notified provider about indwelling wick catheter discussed removal or continued need.    Did provider choose to remove indwelling wick catheter? NO    Provider's wick indication for keeping indwelling wick catheter: Indication for continued use: Retention    Is there an order for indwelling wick catheter? YES    Per urology note on 6/30, wick to remain in for 10-14 days.

## 2023-07-02 LAB — POTASSIUM SERPL-SCNC: 3.9 MMOL/L (ref 3.4–5.3)

## 2023-07-02 PROCEDURE — 84132 ASSAY OF SERUM POTASSIUM: CPT | Performed by: INTERNAL MEDICINE

## 2023-07-02 PROCEDURE — 36415 COLL VENOUS BLD VENIPUNCTURE: CPT | Performed by: INTERNAL MEDICINE

## 2023-07-02 PROCEDURE — 120N000001 HC R&B MED SURG/OB

## 2023-07-02 PROCEDURE — 250N000013 HC RX MED GY IP 250 OP 250 PS 637: Performed by: PHYSICIAN ASSISTANT

## 2023-07-02 PROCEDURE — 250N000013 HC RX MED GY IP 250 OP 250 PS 637: Performed by: INTERNAL MEDICINE

## 2023-07-02 PROCEDURE — 99232 SBSQ HOSP IP/OBS MODERATE 35: CPT | Performed by: INTERNAL MEDICINE

## 2023-07-02 PROCEDURE — 250N000012 HC RX MED GY IP 250 OP 636 PS 637: Performed by: INTERNAL MEDICINE

## 2023-07-02 PROCEDURE — 250N000013 HC RX MED GY IP 250 OP 250 PS 637: Performed by: HOSPITALIST

## 2023-07-02 RX ADMIN — PANTOPRAZOLE SODIUM 40 MG: 40 TABLET, DELAYED RELEASE ORAL at 08:13

## 2023-07-02 RX ADMIN — ACETAMINOPHEN 1000 MG: 500 TABLET ORAL at 08:13

## 2023-07-02 RX ADMIN — OXYCODONE HYDROCHLORIDE 10 MG: 5 TABLET ORAL at 20:56

## 2023-07-02 RX ADMIN — TAMSULOSIN HYDROCHLORIDE 0.4 MG: 0.4 CAPSULE ORAL at 08:13

## 2023-07-02 RX ADMIN — ACETAMINOPHEN 1000 MG: 500 TABLET ORAL at 21:02

## 2023-07-02 RX ADMIN — ACETAMINOPHEN 1000 MG: 500 TABLET ORAL at 15:49

## 2023-07-02 RX ADMIN — PANTOPRAZOLE SODIUM 40 MG: 40 TABLET, DELAYED RELEASE ORAL at 15:49

## 2023-07-02 RX ADMIN — SENNOSIDES AND DOCUSATE SODIUM 1 TABLET: 50; 8.6 TABLET ORAL at 21:02

## 2023-07-02 RX ADMIN — DEXAMETHASONE 4 MG: 4 TABLET ORAL at 08:13

## 2023-07-02 RX ADMIN — DEXAMETHASONE 4 MG: 4 TABLET ORAL at 20:56

## 2023-07-02 RX ADMIN — ATORVASTATIN CALCIUM 10 MG: 10 TABLET, FILM COATED ORAL at 08:13

## 2023-07-02 ASSESSMENT — ACTIVITIES OF DAILY LIVING (ADL)
ADLS_ACUITY_SCORE: 33
ADLS_ACUITY_SCORE: 34

## 2023-07-02 NOTE — PLAN OF CARE
Goal Outcome Evaluation:       Pt is A&Ox 4. VSS on RA. Mechanical Soft Diet. Assist x 2 with gait belt and Tricia Steady. Downing Catheter in place with good output. No BM this shift. K+ Protocol. Pt R PIV was leaking last night when attempted to flush it. Removed it and replaced a new IV in the Left Forearm. Denies Pain, Nausea and Vomiting. [place] uses hearing aid in left ear and reads lips. Discharge pending.

## 2023-07-02 NOTE — PROGRESS NOTES
[facility]    Internal Medicine Hospitalist Progress Note  07/02/2023  I evaluated patient on the above date.            Assessment & Plan     Enrique Dumas is a 67 year old male with history including HTN; HLD; and congenital [place]; who presented 6/27/2023 with right leg swelling with recent back pain. 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     On initially evaluation, pt was afebrile, hemodynamically stable, not hypoxic. ECG showed SR with NSTWA inferior limb leads, no acute ischemic changes. Labs notable for CBC normal; BMP with sodium 135, potassium 2.7; LFT's and lipase normal; trop negative; d-dimer 0.69; UA negative for signs of infection.    Right lower extremity US 6/28 negative for DVT.     CT CAP 6/28 showed no evidence for pulmonary emboli; proximal gastric wall thickening concerning for an infiltrative or neoplastic process which included a soft tissue mass along the posterior margin of the proximal stomach confluent with the adjacent spleen and pancreatic tail; sclerotic changes involving the T10 -T12 vertebral bodies with destructive changes of T12 and abnormal surrounding paravertebral soft tissue extending into the posterior mediastinum, findings also concerning for a neoplastic process; indeterminate 9 mm pulmonary nodule right lung apex could represent a metastatic lesion; hypodense right hepatic lobe lesion concerning for metastasis. Splenic metastasis also possible.    MRI C-spine and L-spine 6/27 negative for acute findings. MRI T-spine 6/27 showed diffuse infiltrative process of the bone marrow of the thoracic spine both anteriorly and posteriorly with extraosseous extension a diffuse involvement of the neural foramen consistent with diffuse metastatic disease; extraosseous extension leads canal compromise and neural foraminal narrowing from the T8 to the T10-T11 disc space level; no abnormal signal thoracic  spinal cord; prominent involvement of the posterior ribs in the thoracic region with extension into the soft tissues.      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  RLE pain and swelling, suspect related to above.  Back pain and abdominal pain related to above.  * Initial presentation and imaging as above. Pt presented with right lower extremity edema but had been having back pain since the beginning of the year; also had c/o abdominal pain. GI and Oncology consulted on admit. Started on IV pantoprazole on admit.  * On 6/28, pt c/o worsened right thigh pain and swelling. CT thigh did not show any acute findings. Underwent EGD that showed normal esophagus; enlarged gastric folds with hemorrhage, biopsied; erosive gastropathy with no bleeding and no stigmata of recent bleeding; normal duodenal bulb, first portion of the duodenum and second portion of the duodenum. 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 Seen by Oncology and was started on dexamethasone.   * On 6/29, still with back pain, but thigh pain and swelling improved. Seen by Radiation Oncology and started on palliative spine radiation. Started scheduled acetaminophen and lidocaine patch.  * On 6/30,  symptoms improved.  - Follow-up path results - Dr. Lord reaching out to Pathologist.  - Continue palliative spine radiation 3 out of 5 today per Radiation Oncology   - Oncology following, plan palliative chemotherapy and outpatient PET scan pending path results.  - Continue dexamethasone 4 mg q12h.  - Continue acetaminophen 1000 mg TID and lidocaine patch.  - Continue PRN oxycodone 5-10 mg q4h. PRN cyclobenzaprine and PRN IV hydromorphone; minimize opioids as able.  - Continue to keep right lower extremity elevated.  - Appreciate consultant help.    Erosive gastropathy on EGD.  * Initial presentation as above. Started on pantoprazole on admit.  * EGD 6/28 as above.  - Continue pantoprazole .    Urinary retention, question from immobility, possible BPH.  Hematuria, suspect related to catheter trauma.  * On 6/28, pt noted with urinary retention requiring multiple SIC.  * On 6/29, started on tamsulosin. Continued to have retention with pain/discomfort with multiple straight catheterizations and Downing placed. Subsequently had bloody urine noted. Urology consulted.  - Continue Downing for 10-14d (Urology advised TOV no sooner than 7/5).  - Continue PRN irrigation.  - Continue tamsulosin.  - Follow-up with [facility] outpatient, appreciate help.    Indeterminate pulmonary nodule.  * Initial presentation as above. CT also showed an indeterminate 9 mm pulmonary nodule right lung apex, could represent a metastatic lesion.  - PET CT outpatient as above.    Weakness and physical deconditioning due to multiple acute medical issues.  * Pt with significant weakness with difficulty walking due to above issues.  * Pt had an assisted fall and PT and OT consulted 6/28.  - Continue PT and OT.  - Continue to treat other issues as noted.    Hypertension (benign essential).  [PTA: valsartan-HCTZ 320-25 mg daily.]  * Valsartan-HCTZ held on admit due to hypokalemia.  - Continue to hold valsartan-HCTZ.    Hyponatremia, suspect  "nutritional/hypovolemia.  * Sodium 135 on admit. Pt started on LR on admit.  * IVF's completed 6/28.  Recent Labs   Lab 06/30/23  0752 06/29/23  0727 06/28/23  0816 06/27/23  1856   * 134* 132* 135*   - Continue to monitor BMP.    Hypokalemia.  * Potassium low on admit, suspect due to decreased PO intake and diuretic. PTA diuretic held on admit.  Recent Labs   Lab 07/02/23  0806 07/01/23  0650 06/30/23  0752 06/29/23  0727 06/28/23  0816 06/27/23  1856   POTASSIUM 3.9 3.9 3.5 3.6 3.6 2.7*   - Continue PRN K replacement.    Hyperlipidemia/dyslipidemia.  - Continue atorvastatin.      Clinically Significant Risk Factors                         # Obesity: Estimated body mass index is 31.58 kg/m  as calculated from the following:    Height as of this encounter: 1.803 m (5' 11\").    Weight as of this encounter: 102.7 kg (226 lb 6.6 oz).             COVID-19 testing.  COVID-19 PCR Results        9/20/2021    10:02   COVID-19 PCR Results   COVID-19 Virus by PCR (External Result) Negative        Details          This result is from an external source.         COVID-19 Antibody Results, Testing for Immunity         No data to display                Diet: Mechanical/Dental Soft Diet  Room Service  Snacks/Supplements Adult: Other; pt may order supplements prn; With Meals  Snacks/Supplements Adult: Other; vanilla [place] packet with dinner meal; With Meals    Prophylaxis: PCD's, ambulation.   Downing Catheter: PRESENT, indication: Retention  Lines: None     Code Status: Full Code    Disposition Plan   Expected discharge: 3-4d recommended to prior living arrangement pending continued radiation treatments, improved mobility.  Entered: Maria Guadalupe Clemente MD 07/02/2023, 10:14 AM       Communication.  - I d/w pt's wife 7/2/23 .    Interval History      Pain and mobility better.   Denies any acute new complaints.    -Data reviewed today: I reviewed all new labs and imaging over the last 24 hours. I personally reviewed no images or " "EKG's today.    Physical Exam    , Blood pressure 111/71, pulse 99, temperature 98.5  F (36.9  C), temperature source Oral, resp. rate 18, height 1.803 m (5' 11\"), weight 102.7 kg (226 lb 6.6 oz), SpO2 98 %. O2 Device: None (Room air)    Vitals:    06/27/23 1845 06/28/23 1659   Weight: 104.3 kg (230 lb) 102.7 kg (226 lb 6.6 oz)     Vital Signs with Ranges  Temp:  [97.6  F (36.4  C)-98.5  F (36.9  C)] 98.5  F (36.9  C)  Pulse:  [70-99] 99  Resp:  [16-18] 18  BP: (105-115)/(59-71) 111/71  SpO2:  [94 %-99 %] 98 %  Patient Vitals for the past 24 hrs:   BP Temp Temp src Pulse Resp SpO2   07/02/23 0801 111/71 98.5  F (36.9  C) Oral 99 18 98 %   07/01/23 2338 115/59 97.6  F (36.4  C) Oral 70 16 99 %   07/01/23 1551 105/62 97.7  F (36.5  C) Oral 81 18 94 %     I/O's Last 24 hours  I/O last 3 completed shifts:  In: 590 [P.O.:590]  Out: 2400 [Urine:2400]    Constitutional: Awake, alert, oriented, pleasant, conversant.  Respiratory: Diminished in bases. No crackles or wheezes.  Cardiovascular: RRR, no m/r/g.  GI:   Skin/Integumen:   Other:        Data    Labs reviewed.  Recent Labs   Lab 07/02/23  0806 07/01/23  0650 06/30/23  0752 06/29/23  0727 06/28/23  0816 06/27/23  1856   WBC  --   --   --  6.8 7.0 7.9   HGB  --   --   --  15.3 14.7 13.8   MCV  --   --   --  87 87 85   PLT  --   --   --  261 244 246   NA  --   --  135* 134* 132* 135*   POTASSIUM 3.9 3.9 3.5 3.6 3.6 2.7*   CHLORIDE  --   --  96* 96* 94* 98   CO2  --   --  26 23 26 24   BUN  --   --  20.0 14.1 17.6 17.3   CR  --   --  0.66* 0.65* 0.81 0.74   ANIONGAP  --   --  13 15 12 13   ESTEFANY  --   --  9.5 9.9 9.4 8.9   GLC  --   --  114* 122* 111* 96   ALBUMIN  --   --   --   --  4.1 4.0   PROTTOTAL  --   --   --   --  7.1 6.7   BILITOTAL  --   --   --   --  0.7 0.3   ALKPHOS  --   --   --   --  91 82   ALT  --   --   --   --  19 18   AST  --   --   --   --  21 19   LIPASE  --   --   --   --   --  21     Recent Labs   Lab Test 06/27/23  1856   TROPONIN T HIGH " SENSITIVITY 7     Recent Labs   Lab 06/30/23  0752 06/29/23  0727 06/28/23  0816 06/27/23  1856   * 122* 111* 96     No lab results found.    No results for input(s): INR, [id number] in the last 168 hours.  Recent Labs   Lab 06/29/23  0727 06/28/23  0816 06/27/23  1856   WBC 6.8 7.0 7.9   DD  --   --  0.69*       MICRO:  CULTURES (INCLUDING BLOOD AND URINE):  No lab results found in last 7 days.    No results found for this or any previous visit (from the past 24 hour(s)).    Medications   All medications were reviewed.    Infusions:    Scheduled Medications:    acetaminophen  1,000 mg Oral TID     atorvastatin  10 mg Oral Daily     dexamethasone  4 mg Oral Q12H MADHAVI (08/20)     pantoprazole  40 mg Oral BID AC     polyethylene glycol  17 g Oral Daily     senna-docusate  1 tablet Oral At Bedtime     sodium chloride (PF)  3 mL Intracatheter Q8H     tamsulosin  0.4 mg Oral Daily     PRN Medications:  benzonatate, bisacodyl, cyclobenzaprine, guaiFENesin-dextromethorphan, HYDROmorphone, melatonin, naloxone **OR** naloxone **OR** naloxone **OR** naloxone, ondansetron **OR** ondansetron, oxyCODONE, simethicone, sodium chloride (PF)

## 2023-07-02 NOTE — PLAN OF CARE
Orientation: A&Ox4. Deaf. Limited hearing in L ear - hearing aides in place     Vital signs: VSS on RA.     Aggression Stop Light: Green    Mobility: A2 with sarasteady    Pain Management: Denies pain this shift. On scheduled Tylenol     Diet: Mechanical soft     Bowel/Bladder: Downing, adequate output. Plan for TOV per urology after 7/5 (Keep in place for 10 -12 days) Also had x1 continent BM on BSC.     Abnormal Lab/Assessments: BLE numbness and tingling, but improving per pt.     Drain/Device/Wound: IVSL     Other: Biopsy results still pending.     Consults: Hem Onc, Radiation Oncology, PT, OT     D/C Day/Goals/Place: Plan for radiation on Monday and Wednesday and then discharge to TCU pending placement.

## 2023-07-03 ENCOUNTER — APPOINTMENT (OUTPATIENT)
Dept: OCCUPATIONAL THERAPY | Facility: CLINIC | Age: 68
DRG: 824 | End: 2023-07-03
Payer: MEDICARE

## 2023-07-03 LAB — POTASSIUM SERPL-SCNC: 4.2 MMOL/L (ref 3.4–5.3)

## 2023-07-03 PROCEDURE — 250N000011 HC RX IP 250 OP 636: Performed by: INTERNAL MEDICINE

## 2023-07-03 PROCEDURE — 250N000013 HC RX MED GY IP 250 OP 250 PS 637: Performed by: INTERNAL MEDICINE

## 2023-07-03 PROCEDURE — 97535 SELF CARE MNGMENT TRAINING: CPT | Mod: GO

## 2023-07-03 PROCEDURE — 77412 RADIATION TX DELIVERY LVL 3: CPT

## 2023-07-03 PROCEDURE — 36415 COLL VENOUS BLD VENIPUNCTURE: CPT | Performed by: INTERNAL MEDICINE

## 2023-07-03 PROCEDURE — 250N000013 HC RX MED GY IP 250 OP 250 PS 637: Performed by: HOSPITALIST

## 2023-07-03 PROCEDURE — 120N000001 HC R&B MED SURG/OB

## 2023-07-03 PROCEDURE — 250N000013 HC RX MED GY IP 250 OP 250 PS 637: Performed by: PHYSICIAN ASSISTANT

## 2023-07-03 PROCEDURE — 84132 ASSAY OF SERUM POTASSIUM: CPT | Performed by: INTERNAL MEDICINE

## 2023-07-03 PROCEDURE — 77387 GUIDANCE FOR RADJ TX DLVR: CPT

## 2023-07-03 PROCEDURE — 99232 SBSQ HOSP IP/OBS MODERATE 35: CPT | Performed by: INTERNAL MEDICINE

## 2023-07-03 PROCEDURE — 250N000012 HC RX MED GY IP 250 OP 636 PS 637: Performed by: INTERNAL MEDICINE

## 2023-07-03 RX ADMIN — HYDROMORPHONE HYDROCHLORIDE 0.3 MG: 1 INJECTION, SOLUTION INTRAMUSCULAR; INTRAVENOUS; SUBCUTANEOUS at 13:28

## 2023-07-03 RX ADMIN — ACETAMINOPHEN 1000 MG: 500 TABLET ORAL at 09:13

## 2023-07-03 RX ADMIN — DEXAMETHASONE 4 MG: 4 TABLET ORAL at 21:26

## 2023-07-03 RX ADMIN — ATORVASTATIN CALCIUM 10 MG: 10 TABLET, FILM COATED ORAL at 09:14

## 2023-07-03 RX ADMIN — PANTOPRAZOLE SODIUM 40 MG: 40 TABLET, DELAYED RELEASE ORAL at 16:40

## 2023-07-03 RX ADMIN — TAMSULOSIN HYDROCHLORIDE 0.4 MG: 0.4 CAPSULE ORAL at 09:14

## 2023-07-03 RX ADMIN — OXYCODONE HYDROCHLORIDE 10 MG: 5 TABLET ORAL at 07:37

## 2023-07-03 RX ADMIN — DEXAMETHASONE 4 MG: 4 TABLET ORAL at 09:14

## 2023-07-03 RX ADMIN — POLYETHYLENE GLYCOL 3350 17 G: 17 POWDER, FOR SOLUTION ORAL at 16:40

## 2023-07-03 RX ADMIN — PANTOPRAZOLE SODIUM 40 MG: 40 TABLET, DELAYED RELEASE ORAL at 06:19

## 2023-07-03 RX ADMIN — SENNOSIDES AND DOCUSATE SODIUM 1 TABLET: 50; 8.6 TABLET ORAL at 21:26

## 2023-07-03 RX ADMIN — ACETAMINOPHEN 1000 MG: 500 TABLET ORAL at 21:27

## 2023-07-03 RX ADMIN — ACETAMINOPHEN 1000 MG: 500 TABLET ORAL at 16:40

## 2023-07-03 ASSESSMENT — ACTIVITIES OF DAILY LIVING (ADL)
ADLS_ACUITY_SCORE: 33

## 2023-07-03 NOTE — PROGRESS NOTES
Radiation treatment # 4 to T-spine.  1600 cGy total dose to date delivered with 10  MV Photons.  Patient has 1 treatment remaining.  SE  RTT

## 2023-07-03 NOTE — PROGRESS NOTES
[facility]  Gastroenterology Progress Note     Enrique Dumas MRN# [id number]   YOB: 1955 Age: 67 year old          Assessment and Plan:       Hypokalemia    Right leg swelling    Metastatic malignant neoplasm, unspecified site (H)  Patient is clinically stable patient is overall improving slowly patient's pain is better awaiting biopsy results from upper endoscopy and endoscopic ultrasound.  No active new GI complaint GI will continue to follow along.            Right leg swelling  Metastatic malignant neoplasm, unspecified site (H)  Hypokalemia      Interval History:     doing well; no cp, sob, n/v/d, or abd pain.              Review of Systems:     C: NEGATIVE for fever, chills, change in weight  E/M: NEGATIVE for ear, mouth and throat problems  R: NEGATIVE for significant cough or SOB  CV: NEGATIVE for chest pain, palpitations or peripheral edema             Medications:   I have reviewed this patient's current medications    acetaminophen  1,000 mg Oral TID     atorvastatin  10 mg Oral Daily     dexamethasone  4 mg Oral Q12H MADHAVI (08/20)     pantoprazole  40 mg Oral BID AC     polyethylene glycol  17 g Oral Daily     senna-docusate  1 tablet Oral At Bedtime     sodium chloride (PF)  3 mL Intracatheter Q8H     tamsulosin  0.4 mg Oral Daily                  Physical Exam:   Vitals were reviewed  Vital Signs with Ranges  Temp:  [97.6  F (36.4  C)-98.5  F (36.9  C)] 97.6  F (36.4  C)  Pulse:  [70-99] 88  Resp:  [16-18] 16  BP: (107-115)/(59-71) 107/70  SpO2:  [97 %-99 %] 97 %  I/O last 3 completed shifts:  In: 1430 [P.O.:1430]  Out: 2800 [Urine:2800]  Cardiovascular: negative, PMI normal. No lifts, heaves, or thrills. RRR. No murmurs, clicks gallops or rub  Respiratory: negative, Percussion normal. Good diaphragmatic excursion. Lungs clear  Abdomen: Abdomen soft, non-tender. BS normal. No masses, organomegaly           Data:   I reviewed the patient's new clinical lab test  results.   Recent Labs   Lab Test 06/29/23  0727 06/28/23  0816 06/27/23  1856   WBC 6.8 7.0 7.9   HGB 15.3 14.7 13.8   MCV 87 87 85    244 246     Recent Labs   Lab Test 07/02/23  0806 07/01/23  0650 06/30/23  0752 06/29/23 0727 06/28/23  0816   POTASSIUM 3.9 3.9 3.5 3.6 3.6   CHLORIDE  --   --  96* 96* 94*   CO2  --   --  26 23 26   BUN  --   --  20.0 14.1 17.6   ANIONGAP  --   --  13 15 12     Recent Labs   Lab Test 06/28/23 0816 06/27/23 2001 06/27/23  1856   ALBUMIN 4.1  --  4.0   BILITOTAL 0.7  --  0.3   ALT 19  --  18   AST 21  --  19   PROTEIN  --  Negative  --    LIPASE  --   --  21       I reviewed the patient's new imaging results.    All laboratory data reviewed  All imaging studies reviewed by me.    Emory Gaston MD,  7/2/2023  [facility]  Office : [phone]  Cell: [phone]      [facility]  Ph: [phone] Fax: [phone]

## 2023-07-03 NOTE — PROGRESS NOTES
Oncology Chart Check  - Patient away at radiation but spoke with wife  - No pathology available yet, we will continue to await results  - Patient continues radiation and steroids in the interim  Yoli An PA-C (cell: [phone])

## 2023-07-03 NOTE — PLAN OF CARE
[id number]  A&Ox4. Pt is deaf, w/ limited hearing in L ear. Pt lip reads, has hearing aid in L. VSS on RA. C/o of back pain, PRN oxycodone provided w/ relief. L MARIVEL Downing in place, plan for trail void after 7/5. [facility] soft diet. Ax2 w/ binu steady. On K protocol. Plan for radiation to spine tx #4/5 on 7/3.

## 2023-07-03 NOTE — PLAN OF CARE
[id number]: A&O- lip reads due to hearing impairment. VSS on RA. Pain in back, oxy this AM and then IV dilaudid prior to radiation. PIV removed- pt will try oxy on Wednesday prior to last radiation. Pt's sit to stand strength significantly improved today- using binu steady. BLE edema. Tingling in feet. Good appetite. Passing gas, miralax given this afternoon. Downing in place- TO 7/5 or later. K WNL. Plan pending biopsy results.

## 2023-07-03 NOTE — PROGRESS NOTES
[facility]    Internal Medicine Hospitalist Progress Note  07/03/2023  I evaluated patient on the above date.            Assessment & Plan     Enrique Dumas is a 67 year old male with history including HTN; HLD; and congenital [place]; who presented 6/27/2023 with right leg swelling with recent back pain. 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     On initially evaluation, pt was afebrile, hemodynamically stable, not hypoxic. ECG showed SR with NSTWA inferior limb leads, no acute ischemic changes. Labs notable for CBC normal; BMP with sodium 135, potassium 2.7; LFT's and lipase normal; trop negative; d-dimer 0.69; UA negative for signs of infection.    Right lower extremity US 6/28 negative for DVT.     CT CAP 6/28 showed no evidence for pulmonary emboli; proximal gastric wall thickening concerning for an infiltrative or neoplastic process which included a soft tissue mass along the posterior margin of the proximal stomach confluent with the adjacent spleen and pancreatic tail; sclerotic changes involving the T10 -T12 vertebral bodies with destructive changes of T12 and abnormal surrounding paravertebral soft tissue extending into the posterior mediastinum, findings also concerning for a neoplastic process; indeterminate 9 mm pulmonary nodule right lung apex could represent a metastatic lesion; hypodense right hepatic lobe lesion concerning for metastasis. Splenic metastasis also possible.    MRI C-spine and L-spine 6/27 negative for acute findings. MRI T-spine 6/27 showed diffuse infiltrative process of the bone marrow of the thoracic spine both anteriorly and posteriorly with extraosseous extension a diffuse involvement of the neural foramen consistent with diffuse metastatic disease; extraosseous extension leads canal compromise and neural foraminal narrowing from the T8 to the T10-T11 disc space level; no abnormal signal thoracic  spinal cord; prominent involvement of the posterior ribs in the thoracic region with extension into the soft tissues.      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  RLE pain and swelling, suspect related to above.  Back pain and abdominal pain related to above.  * Initial presentation and imaging as above. Pt presented with right lower extremity edema but had been having back pain since the beginning of the year; also had c/o abdominal pain. GI and Oncology consulted on admit. Started on IV pantoprazole on admit.  * On 6/28, pt c/o worsened right thigh pain and swelling. CT thigh did not show any acute findings. Underwent EGD that showed normal esophagus; enlarged gastric folds with hemorrhage, biopsied; erosive gastropathy with no bleeding and no stigmata of recent bleeding; normal duodenal bulb, first portion of the duodenum and second portion of the duodenum. 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 Seen by Oncology and was started on dexamethasone.   * On 6/29, still with back pain, but thigh pain and swelling improved. Seen by Radiation Oncology and started on palliative spine radiation. Started scheduled acetaminophen and lidocaine patch.  * On 6/30,  symptoms improved.  - Follow-up path results - Dr. Lord reaching out to Pathologist.  - Continue palliative spine radiation 4 out of 5 today per Radiation Oncology   - Oncology following, plan palliative chemotherapy and outpatient PET scan pending path results.  - Continue dexamethasone 4 mg q12h.  - Continue acetaminophen 1000 mg TID and lidocaine patch.  - Continue PRN oxycodone 5-10 mg q4h. PRN cyclobenzaprine and PRN IV hydromorphone; minimize opioids as able.  - Continue to keep right lower extremity elevated.  - Appreciate consultant help.    Erosive gastropathy on EGD.  * Initial presentation as above. Started on pantoprazole on admit.  * EGD 6/28 as above.  - Continue pantoprazole .    Urinary retention, question from immobility, possible BPH.  Hematuria, suspect related to catheter trauma.  * On 6/28, pt noted with urinary retention requiring multiple SIC.  * On 6/29, started on tamsulosin. Continued to have retention with pain/discomfort with multiple straight catheterizations and Downing placed. Subsequently had bloody urine noted. Urology consulted.  - Continue Downing for 10-14d (Urology advised TOV no sooner than 7/5).  - Continue PRN irrigation.  - Continue tamsulosin.  - Follow-up with [facility] outpatient, appreciate help.    Indeterminate pulmonary nodule.  * Initial presentation as above. CT also showed an indeterminate 9 mm pulmonary nodule right lung apex, could represent a metastatic lesion.  - PET CT outpatient as above.    Weakness and physical deconditioning due to multiple acute medical issues.  * Pt with significant weakness with difficulty walking due to above issues.  * Pt had an assisted fall and PT and OT consulted 6/28.  - Continue PT and OT.  - Continue to treat other issues as noted.    Hypertension (benign essential).  [PTA: valsartan-HCTZ 320-25 mg daily.]  * Valsartan-HCTZ held on admit due to hypokalemia.  - Continue to hold valsartan-HCTZ.    Hyponatremia, suspect  "nutritional/hypovolemia.  * Sodium 135 on admit. Pt started on LR on admit.  * IVF's completed 6/28.  Recent Labs   Lab 06/30/23  0752 06/29/23  0727 06/28/23  0816 06/27/23  1856   * 134* 132* 135*   - Continue to monitor BMP.    Hypokalemia.  * Potassium low on admit, suspect due to decreased PO intake and diuretic. PTA diuretic held on admit.  Recent Labs   Lab 07/03/23  0717 07/02/23  0806 07/01/23  0650 06/30/23  0752 06/29/23  0727 06/28/23  0816   POTASSIUM 4.2 3.9 3.9 3.5 3.6 3.6   - Continue PRN K replacement.    Hyperlipidemia/dyslipidemia.  - Continue atorvastatin.      Clinically Significant Risk Factors                         # Obesity: Estimated body mass index is 31.58 kg/m  as calculated from the following:    Height as of this encounter: 1.803 m (5' 11\").    Weight as of this encounter: 102.7 kg (226 lb 6.6 oz).             COVID-19 testing.  COVID-19 PCR Results        9/20/2021    10:02   COVID-19 PCR Results   COVID-19 Virus by PCR (External Result) Negative        Details          This result is from an external source.         COVID-19 Antibody Results, Testing for Immunity         No data to display                Diet: Mechanical/Dental Soft Diet  Room Service  Snacks/Supplements Adult: Other; pt may order supplements prn; With Meals  Snacks/Supplements Adult: Other; vanilla [place] packet with dinner meal; With Meals    Prophylaxis: PCD's, ambulation.   Downing Catheter: PRESENT, indication: Retention  Lines: None     Code Status: Full Code    Disposition Plan   Expected discharge: 3-4d recommended to prior living arrangement pending continued radiation treatments, improved mobility.  Entered: Maria Guadalupe Clemente MD 07/03/2023, 11:32 AM       Communication.  - I d/w pt's wife 7/3/23 .    Interval History      Pain and mobility better.   Denies any acute new complaints. Going for 4th radiation treatment today     -Data reviewed today: I reviewed all new labs and imaging over the last 24 " "hours. I personally reviewed no images or EKG's today.    Physical Exam    , Blood pressure (!) 141/100, pulse 85, temperature 98  F (36.7  C), temperature source Oral, resp. rate 18, height 1.803 m (5' 11\"), weight 102.7 kg (226 lb 6.6 oz), SpO2 98 %. O2 Device: None (Room air)    Vitals:    06/27/23 1845 06/28/23 1659   Weight: 104.3 kg (230 lb) 102.7 kg (226 lb 6.6 oz)     Vital Signs with Ranges  Temp:  [97.4  F (36.3  C)-98  F (36.7  C)] 98  F (36.7  C)  Pulse:  [81-88] 85  Resp:  [16-18] 18  BP: (107-151)/() 141/100  SpO2:  [97 %-98 %] 98 %  Patient Vitals for the past 24 hrs:   BP Temp Temp src Pulse Resp SpO2   07/03/23 0806 (!) 141/100 98  F (36.7  C) Oral 85 18 98 %   07/03/23 0420 138/88 -- -- -- -- --   07/02/23 2340 (!) 151/90 97.4  F (36.3  C) Oral 81 18 98 %   07/02/23 1529 107/70 97.6  F (36.4  C) Oral 88 16 97 %     I/O's Last 24 hours  I/O last 3 completed shifts:  In: 840 [P.O.:840]  Out: 1500 [Urine:1500]    Constitutional: Awake, alert, oriented, pleasant, conversant.  Respiratory: Diminished in bases. No crackles or wheezes.  Cardiovascular: RRR, no m/r/g.  GI:   Skin/Integumen:   Other:        Data    Labs reviewed.  Recent Labs   Lab 07/03/23  0717 07/02/23  0806 07/01/23  0650 06/30/23  0752 06/29/23  0727 06/28/23  0816 06/27/23  1856   WBC  --   --   --   --  6.8 7.0 7.9   HGB  --   --   --   --  15.3 14.7 13.8   MCV  --   --   --   --  87 87 85   PLT  --   --   --   --  261 244 246   NA  --   --   --  135* 134* 132* 135*   POTASSIUM 4.2 3.9 3.9 3.5 3.6 3.6 2.7*   CHLORIDE  --   --   --  96* 96* 94* 98   CO2  --   --   --  26 23 26 24   BUN  --   --   --  20.0 14.1 17.6 17.3   CR  --   --   --  0.66* 0.65* 0.81 0.74   ANIONGAP  --   --   --  13 15 12 13   ESTEFANY  --   --   --  9.5 9.9 9.4 8.9   GLC  --   --   --  114* 122* 111* 96   ALBUMIN  --   --   --   --   --  4.1 4.0   PROTTOTAL  --   --   --   --   --  7.1 6.7   BILITOTAL  --   --   --   --   --  0.7 0.3   ALKPHOS  --   --   --   " --   --  91 82   ALT  --   --   --   --   --  19 18   AST  --   --   --   --   --  21 19   LIPASE  --   --   --   --   --   --  21     Recent Labs   Lab Test 06/27/23  1856   TROPONIN T HIGH SENSITIVITY 7     Recent Labs   Lab 06/30/23  0752 06/29/23  0727 06/28/23  0816 06/27/23  1856   * 122* 111* 96     No lab results found.    No results for input(s): INR, [id number] in the last 168 hours.  Recent Labs   Lab 06/29/23  0727 06/28/23  0816 06/27/23  1856   WBC 6.8 7.0 7.9   DD  --   --  0.69*       MICRO:  CULTURES (INCLUDING BLOOD AND URINE):  No lab results found in last 7 days.    No results found for this or any previous visit (from the past 24 hour(s)).    Medications   All medications were reviewed.    Infusions:    Scheduled Medications:    acetaminophen  1,000 mg Oral TID     atorvastatin  10 mg Oral Daily     dexamethasone  4 mg Oral Q12H MADHAVI (08/20)     pantoprazole  40 mg Oral BID AC     polyethylene glycol  17 g Oral Daily     senna-docusate  1 tablet Oral At Bedtime     sodium chloride (PF)  3 mL Intracatheter Q8H     tamsulosin  0.4 mg Oral Daily     PRN Medications:  benzonatate, bisacodyl, cyclobenzaprine, guaiFENesin-dextromethorphan, HYDROmorphone, melatonin, naloxone **OR** naloxone **OR** naloxone **OR** naloxone, ondansetron **OR** ondansetron, oxyCODONE, simethicone, sodium chloride (PF)

## 2023-07-04 LAB
ANION GAP SERPL CALCULATED.3IONS-SCNC: 10 MMOL/L (ref 7–15)
BUN SERPL-MCNC: 28.8 MG/DL (ref 8–23)
CALCIUM SERPL-MCNC: 9.3 MG/DL (ref 8.8–10.2)
CHLORIDE SERPL-SCNC: 100 MMOL/L (ref 98–107)
CREAT SERPL-MCNC: 0.69 MG/DL (ref 0.67–1.17)
DEPRECATED HCO3 PLAS-SCNC: 25 MMOL/L (ref 22–29)
ERYTHROCYTE [DISTWIDTH] IN BLOOD BY AUTOMATED COUNT: 15 % (ref 10–15)
GFR SERPL CREATININE-BSD FRML MDRD: >90 ML/MIN/1.73M2
GLUCOSE SERPL-MCNC: 129 MG/DL (ref 70–99)
HCT VFR BLD AUTO: 41.5 % (ref 40–53)
HGB BLD-MCNC: 14.1 G/DL (ref 13.3–17.7)
MCH RBC QN AUTO: 29.4 PG (ref 26.5–33)
MCHC RBC AUTO-ENTMCNC: 34 G/DL (ref 31.5–36.5)
MCV RBC AUTO: 87 FL (ref 78–100)
PLATELET # BLD AUTO: 227 10E3/UL (ref 150–450)
POTASSIUM SERPL-SCNC: 4.4 MMOL/L (ref 3.4–5.3)
RBC # BLD AUTO: 4.8 10E6/UL (ref 4.4–5.9)
SODIUM SERPL-SCNC: 135 MMOL/L (ref 136–145)
WBC # BLD AUTO: 9.4 10E3/UL (ref 4–11)

## 2023-07-04 PROCEDURE — 99232 SBSQ HOSP IP/OBS MODERATE 35: CPT | Performed by: INTERNAL MEDICINE

## 2023-07-04 PROCEDURE — 120N000001 HC R&B MED SURG/OB

## 2023-07-04 PROCEDURE — 85027 COMPLETE CBC AUTOMATED: CPT | Performed by: INTERNAL MEDICINE

## 2023-07-04 PROCEDURE — 250N000013 HC RX MED GY IP 250 OP 250 PS 637: Performed by: PHYSICIAN ASSISTANT

## 2023-07-04 PROCEDURE — 36415 COLL VENOUS BLD VENIPUNCTURE: CPT | Performed by: INTERNAL MEDICINE

## 2023-07-04 PROCEDURE — 250N000013 HC RX MED GY IP 250 OP 250 PS 637: Performed by: INTERNAL MEDICINE

## 2023-07-04 PROCEDURE — 80048 BASIC METABOLIC PNL TOTAL CA: CPT | Performed by: INTERNAL MEDICINE

## 2023-07-04 PROCEDURE — 99233 SBSQ HOSP IP/OBS HIGH 50: CPT | Performed by: INTERNAL MEDICINE

## 2023-07-04 PROCEDURE — 250N000012 HC RX MED GY IP 250 OP 636 PS 637: Performed by: INTERNAL MEDICINE

## 2023-07-04 PROCEDURE — 250N000013 HC RX MED GY IP 250 OP 250 PS 637: Performed by: HOSPITALIST

## 2023-07-04 RX ADMIN — POLYETHYLENE GLYCOL 3350 17 G: 17 POWDER, FOR SOLUTION ORAL at 08:54

## 2023-07-04 RX ADMIN — DEXAMETHASONE 4 MG: 4 TABLET ORAL at 08:54

## 2023-07-04 RX ADMIN — ACETAMINOPHEN 1000 MG: 500 TABLET ORAL at 17:42

## 2023-07-04 RX ADMIN — ACETAMINOPHEN 1000 MG: 500 TABLET ORAL at 08:54

## 2023-07-04 RX ADMIN — TAMSULOSIN HYDROCHLORIDE 0.4 MG: 0.4 CAPSULE ORAL at 08:54

## 2023-07-04 RX ADMIN — PANTOPRAZOLE SODIUM 40 MG: 40 TABLET, DELAYED RELEASE ORAL at 17:42

## 2023-07-04 RX ADMIN — DEXAMETHASONE 4 MG: 4 TABLET ORAL at 19:53

## 2023-07-04 RX ADMIN — ATORVASTATIN CALCIUM 10 MG: 10 TABLET, FILM COATED ORAL at 08:54

## 2023-07-04 RX ADMIN — BISACODYL 10 MG: 10 SUPPOSITORY RECTAL at 14:19

## 2023-07-04 RX ADMIN — SENNOSIDES AND DOCUSATE SODIUM 1 TABLET: 50; 8.6 TABLET ORAL at 21:25

## 2023-07-04 RX ADMIN — ACETAMINOPHEN 1000 MG: 500 TABLET ORAL at 21:25

## 2023-07-04 RX ADMIN — PANTOPRAZOLE SODIUM 40 MG: 40 TABLET, DELAYED RELEASE ORAL at 06:38

## 2023-07-04 ASSESSMENT — ACTIVITIES OF DAILY LIVING (ADL)
ADLS_ACUITY_SCORE: 33
ADLS_ACUITY_SCORE: 33
ADLS_ACUITY_SCORE: 37
ADLS_ACUITY_SCORE: 33
ADLS_ACUITY_SCORE: 37
ADLS_ACUITY_SCORE: 37
ADLS_ACUITY_SCORE: 33
ADLS_ACUITY_SCORE: 33
ADLS_ACUITY_SCORE: 37

## 2023-07-04 NOTE — PROGRESS NOTES
[facility]    Internal Medicine Hospitalist Progress Note  07/04/2023  I evaluated patient on the above date.            Assessment & Plan     Enrique Dumas is a 67 year old male with history including HTN; HLD; and congenital [place]; who presented 6/27/2023 with right leg swelling with recent back pain. 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     On initially evaluation, pt was afebrile, hemodynamically stable, not hypoxic. ECG showed SR with NSTWA inferior limb leads, no acute ischemic changes. Labs notable for CBC normal; BMP with sodium 135, potassium 2.7; LFT's and lipase normal; trop negative; d-dimer 0.69; UA negative for signs of infection.    Right lower extremity US 6/28 negative for DVT.     CT CAP 6/28 showed no evidence for pulmonary emboli; proximal gastric wall thickening concerning for an infiltrative or neoplastic process which included a soft tissue mass along the posterior margin of the proximal stomach confluent with the adjacent spleen and pancreatic tail; sclerotic changes involving the T10 -T12 vertebral bodies with destructive changes of T12 and abnormal surrounding paravertebral soft tissue extending into the posterior mediastinum, findings also concerning for a neoplastic process; indeterminate 9 mm pulmonary nodule right lung apex could represent a metastatic lesion; hypodense right hepatic lobe lesion concerning for metastasis. Splenic metastasis also possible.    MRI C-spine and L-spine 6/27 negative for acute findings. MRI T-spine 6/27 showed diffuse infiltrative process of the bone marrow of the thoracic spine both anteriorly and posteriorly with extraosseous extension a diffuse involvement of the neural foramen consistent with diffuse metastatic disease; extraosseous extension leads canal compromise and neural foraminal narrowing from the T8 to the T10-T11 disc space level; no abnormal signal thoracic  spinal cord; prominent involvement of the posterior ribs in the thoracic region with extension into the soft tissues.      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  RLE pain and swelling, suspect related to above.  Back pain and abdominal pain related to above.  * Initial presentation and imaging as above. Pt presented with right lower extremity edema but had been having back pain since the beginning of the year; also had c/o abdominal pain. GI and Oncology consulted on admit. Started on IV pantoprazole on admit.  * On 6/28, pt c/o worsened right thigh pain and swelling. CT thigh did not show any acute findings. Underwent EGD that showed normal esophagus; enlarged gastric folds with hemorrhage, biopsied; erosive gastropathy with no bleeding and no stigmata of recent bleeding; normal duodenal bulb, first portion of the duodenum and second portion of the duodenum. 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 Seen by Oncology and was started on dexamethasone.   * On 6/29, still with back pain, but thigh pain and swelling improved. Seen by Radiation Oncology and started on palliative spine radiation. Started scheduled acetaminophen and lidocaine patch.  * On 6/30,  symptoms improved.  - Follow-up path results - Dr. Lord reaching out to Pathologist.  - Continue palliative spine radiation 4 out of 5 today per Radiation Oncology   - Oncology following, plan palliative chemotherapy and outpatient PET scan pending path results.  - Continue dexamethasone 4 mg q12h.  - Continue acetaminophen 1000 mg TID and lidocaine patch.  - Continue PRN oxycodone 5-10 mg q4h. PRN cyclobenzaprine and PRN IV hydromorphone; minimize opioids as able.  - Continue to keep right lower extremity elevated.  - Appreciate consultant help.    Erosive gastropathy on EGD.  * Initial presentation as above. Started on pantoprazole on admit.  * EGD 6/28 as above.  - Continue pantoprazole .    Urinary retention, question from immobility, possible BPH.  Hematuria, suspect related to catheter trauma.  * On 6/28, pt noted with urinary retention requiring multiple SIC.  * On 6/29, started on tamsulosin. Continued to have retention with pain/discomfort with multiple straight catheterizations and Downing placed. Subsequently had bloody urine noted. Urology consulted.  - Continue Downing for 10-14d (Urology advised TOV no sooner than 7/5).  - Continue PRN irrigation.  - Continue tamsulosin.  - Follow-up with [facility] outpatient, appreciate help.    Indeterminate pulmonary nodule.  * Initial presentation as above. CT also showed an indeterminate 9 mm pulmonary nodule right lung apex, could represent a metastatic lesion.  - PET CT outpatient as above.    Weakness and physical deconditioning due to multiple acute medical issues.  * Pt with significant weakness with difficulty walking due to above issues.  * Pt had an assisted fall and PT and OT consulted 6/28.  - Continue PT and OT.  - Continue to treat other issues as noted.    Hypertension (benign essential).  [PTA: valsartan-HCTZ 320-25 mg daily.]  * Valsartan-HCTZ held on admit due to hypokalemia.  - Continue to hold valsartan-HCTZ.    Hyponatremia, suspect  "nutritional/hypovolemia.  * Sodium 135 on admit. Pt started on LR on admit.  * IVF's completed 6/28.  Recent Labs   Lab 07/04/23  0620 06/30/23  0752 06/29/23  0727 06/28/23  0816 06/27/23  1856   * 135* 134* 132* 135*   - Continue to monitor BMP.    Hypokalemia.  * Potassium low on admit, suspect due to decreased PO intake and diuretic. PTA diuretic held on admit.  Recent Labs   Lab 07/04/23  0620 07/03/23  0717 07/02/23  0806 07/01/23  0650 06/30/23  0752 06/29/23  0727   POTASSIUM 4.4 4.2 3.9 3.9 3.5 3.6   - Continue PRN K replacement.    Hyperlipidemia/dyslipidemia.  - Continue atorvastatin.      Clinically Significant Risk Factors                         # Obesity: Estimated body mass index is 32.41 kg/m  as calculated from the following:    Height as of this encounter: 1.803 m (5' 11\").    Weight as of this encounter: 105.4 kg (232 lb 5.8 oz).             COVID-19 testing.  COVID-19 PCR Results        9/20/2021    10:02   COVID-19 PCR Results   COVID-19 Virus by PCR (External Result) Negative        Details          This result is from an external source.         COVID-19 Antibody Results, Testing for Immunity         No data to display                Diet: Mechanical/Dental Soft Diet  Room Service  Snacks/Supplements Adult: Other; pt may order supplements prn; With Meals  Snacks/Supplements Adult: Other; vanilla [place] packet with dinner meal; With Meals    Prophylaxis: PCD's, ambulation.   Downing Catheter: PRESENT, indication: Retention  Lines: None     Code Status: Full Code    Disposition Plan   Expected discharge: 2-3days recommended to prior living arrangement pending continued radiation treatments, improved mobility.  Entered: Maria Guadalupe Clemente MD 07/04/2023, 11:01 AM       Communication.  - I d/w pt's wife 7/4/23 .    Interval History      Pain and mobility better.   Denies any acute new complaints. C/o constipation will try suppository today    -Data reviewed today: I reviewed all new labs and " "imaging over the last 24 hours. I personally reviewed no images or EKG's today.    Physical Exam    , Blood pressure 139/88, pulse 70, temperature 97.4  F (36.3  C), temperature source Oral, resp. rate 18, height 1.803 m (5' 11\"), weight 105.4 kg (232 lb 5.8 oz), SpO2 95 %. O2 Device: None (Room air)    Vitals:    06/27/23 1845 06/28/23 1659 07/04/23 0652   Weight: 104.3 kg (230 lb) 102.7 kg (226 lb 6.6 oz) 105.4 kg (232 lb 5.8 oz)     Vital Signs with Ranges  Temp:  [97.4  F (36.3  C)-98.5  F (36.9  C)] 97.4  F (36.3  C)  Pulse:  [70-98] 70  Resp:  [16-18] 18  BP: (112-139)/(78-88) 139/88  SpO2:  [94 %-98 %] 95 %  Patient Vitals for the past 24 hrs:   BP Temp Temp src Pulse Resp SpO2 Weight   07/04/23 0756 139/88 97.4  F (36.3  C) Oral 70 18 95 % --   07/04/23 0652 -- -- -- -- -- -- 105.4 kg (232 lb 5.8 oz)   07/03/23 2340 131/82 98.2  F (36.8  C) Oral 72 18 98 % --   07/03/23 1900 112/78 98.5  F (36.9  C) Oral 88 18 97 % --   07/03/23 1508 137/85 97.6  F (36.4  C) Oral 98 18 94 % --   07/03/23 1328 -- -- -- -- 16 -- --     I/O's Last 24 hours  I/O last 3 completed shifts:  In: 240 [P.O.:240]  Out: 2075 [Urine:2075]    Constitutional: Awake, alert, oriented, pleasant, conversant.  Respiratory: Diminished in bases. No crackles or wheezes.  Cardiovascular: RRR, no m/r/g.  GI:   Skin/Integumen:   Other:        Data    Labs reviewed.  Recent Labs   Lab 07/04/23  0620 07/03/23  0717 07/02/23  0806 07/01/23  0650 06/30/23  0752 06/29/23  0727 06/28/23  0816 06/27/23  1856   WBC 9.4  --   --   --   --  6.8 7.0 7.9   HGB 14.1  --   --   --   --  15.3 14.7 13.8   MCV 87  --   --   --   --  87 87 85     --   --   --   --  261 244 246   *  --   --   --  135* 134* 132* 135*   POTASSIUM 4.4 4.2 3.9   < > 3.5 3.6 3.6 2.7*   CHLORIDE 100  --   --   --  96* 96* 94* 98   CO2 25  --   --   --  26 23 26 24   BUN 28.8*  --   --   --  20.0 14.1 17.6 17.3   CR 0.69  --   --   --  0.66* 0.65* 0.81 0.74   ANIONGAP 10  --   -- "   --  13 15 12 13   ESTEFANY 9.3  --   --   --  9.5 9.9 9.4 8.9   *  --   --   --  114* 122* 111* 96   ALBUMIN  --   --   --   --   --   --  4.1 4.0   PROTTOTAL  --   --   --   --   --   --  7.1 6.7   BILITOTAL  --   --   --   --   --   --  0.7 0.3   ALKPHOS  --   --   --   --   --   --  91 82   ALT  --   --   --   --   --   --  19 18   AST  --   --   --   --   --   --  21 19   LIPASE  --   --   --   --   --   --   --  21    < > = values in this interval not displayed.     Recent Labs   Lab Test 06/27/23  1856   TROPONIN T HIGH SENSITIVITY 7     Recent Labs   Lab 07/04/23  0620 06/30/23  0752 06/29/23  0727 06/28/23  0816 06/27/23  1856   * 114* 122* 111* 96     No lab results found.    No results for input(s): INR, [id number] in the last 168 hours.  Recent Labs   Lab 07/04/23  0620 06/29/23  0727 06/28/23  0816 06/27/23  1856   WBC 9.4 6.8 7.0 7.9   DD  --   --   --  0.69*       MICRO:  CULTURES (INCLUDING BLOOD AND URINE):  No lab results found in last 7 days.    No results found for this or any previous visit (from the past 24 hour(s)).    Medications   All medications were reviewed.    Infusions:    Scheduled Medications:    acetaminophen  1,000 mg Oral TID     atorvastatin  10 mg Oral Daily     dexamethasone  4 mg Oral Q12H MADHAVI (08/20)     pantoprazole  40 mg Oral BID AC     polyethylene glycol  17 g Oral Daily     senna-docusate  1 tablet Oral At Bedtime     sodium chloride (PF)  3 mL Intracatheter Q8H     tamsulosin  0.4 mg Oral Daily     PRN Medications:  benzonatate, bisacodyl, cyclobenzaprine, guaiFENesin-dextromethorphan, HYDROmorphone, melatonin, naloxone **OR** naloxone **OR** naloxone **OR** naloxone, ondansetron **OR** ondansetron, oxyCODONE, simethicone, sodium chloride (PF)

## 2023-07-04 NOTE — PROGRESS NOTES
Med Onc       Patient seen and examined   He feels a lot better since start of radiation and meds   Continues steroids and pain meds   Denies any pain today   Appetite good   Feels legs are working better   Constipated   Has U catheter       O/E  VSS Alert and orientated   Chest clear   Heart normal   GI normal   Legs non tender no edema     Downing present     Biopsy from stomach pending       IMP Probable met cancer , await bx results   Spoke with patient and wife today   Continue current cares   Will get formal Oncology Rx Plan once we have path   Radiation will resume tomorrow       Leonor Pelaez MD     Time 35 mins

## 2023-07-04 NOTE — PROGRESS NOTES
Notified provider about indwelling wick catheter discussed removal or continued need.    Did provider choose to remove indwelling wick catheter? Yes    Provider's wick indication for keeping indwelling wick catheter: Retention.    Is there an order for indwelling wick catheter? Yes    *If there is a plan to keep wick catheter in place at discharge daily notification with provider is not necessary, but please add a notation in the treatment team sticky note that the patient will be discharging with the catheter.

## 2023-07-04 NOTE — PLAN OF CARE
[id number]  VSS on RA. Denied pain today- no radiation so no PRN med needed. No IV. Good appetite. Salina patent, plan for TOV- possibly tomorrow. Suppository given for constipation- effective. No changes in BLE, edema/tingling. Up to chair using binu steady most of day. Biopsy results pending. Last radiation tomorrow.

## 2023-07-05 ENCOUNTER — APPOINTMENT (OUTPATIENT)
Dept: PHYSICAL THERAPY | Facility: CLINIC | Age: 68
DRG: 824 | End: 2023-07-05
Payer: MEDICARE

## 2023-07-05 ENCOUNTER — APPOINTMENT (OUTPATIENT)
Dept: OCCUPATIONAL THERAPY | Facility: CLINIC | Age: 68
DRG: 824 | End: 2023-07-05
Payer: MEDICARE

## 2023-07-05 LAB
PATH REPORT.COMMENTS IMP SPEC: ABNORMAL
PATH REPORT.COMMENTS IMP SPEC: ABNORMAL
PATH REPORT.COMMENTS IMP SPEC: YES
PATH REPORT.FINAL DX SPEC: ABNORMAL
PATH REPORT.GROSS SPEC: ABNORMAL
PATH REPORT.MICROSCOPIC SPEC OTHER STN: ABNORMAL
PATH REPORT.MICROSCOPIC SPEC OTHER STN: ABNORMAL
PATH REPORT.RELEVANT HX SPEC: ABNORMAL
PHOTO IMAGE: ABNORMAL
POTASSIUM SERPL-SCNC: 5.2 MMOL/L (ref 3.4–5.3)

## 2023-07-05 PROCEDURE — 250N000013 HC RX MED GY IP 250 OP 250 PS 637: Performed by: INTERNAL MEDICINE

## 2023-07-05 PROCEDURE — 82787 IGG 1 2 3 OR 4 EACH: CPT | Performed by: INTERNAL MEDICINE

## 2023-07-05 PROCEDURE — 97530 THERAPEUTIC ACTIVITIES: CPT | Mod: GP

## 2023-07-05 PROCEDURE — 84132 ASSAY OF SERUM POTASSIUM: CPT | Performed by: INTERNAL MEDICINE

## 2023-07-05 PROCEDURE — 36415 COLL VENOUS BLD VENIPUNCTURE: CPT | Performed by: INTERNAL MEDICINE

## 2023-07-05 PROCEDURE — 77412 RADIATION TX DELIVERY LVL 3: CPT

## 2023-07-05 PROCEDURE — 250N000012 HC RX MED GY IP 250 OP 636 PS 637: Performed by: INTERNAL MEDICINE

## 2023-07-05 PROCEDURE — 77387 GUIDANCE FOR RADJ TX DLVR: CPT

## 2023-07-05 PROCEDURE — 250N000013 HC RX MED GY IP 250 OP 250 PS 637: Performed by: HOSPITALIST

## 2023-07-05 PROCEDURE — 99232 SBSQ HOSP IP/OBS MODERATE 35: CPT | Performed by: INTERNAL MEDICINE

## 2023-07-05 PROCEDURE — 97530 THERAPEUTIC ACTIVITIES: CPT | Mod: GO

## 2023-07-05 PROCEDURE — 120N000001 HC R&B MED SURG/OB

## 2023-07-05 PROCEDURE — 250N000013 HC RX MED GY IP 250 OP 250 PS 637: Performed by: PHYSICIAN ASSISTANT

## 2023-07-05 RX ADMIN — OXYCODONE HYDROCHLORIDE 10 MG: 5 TABLET ORAL at 12:31

## 2023-07-05 RX ADMIN — OXYCODONE HYDROCHLORIDE 5 MG: 5 TABLET ORAL at 04:47

## 2023-07-05 RX ADMIN — DEXAMETHASONE 4 MG: 4 TABLET ORAL at 20:33

## 2023-07-05 RX ADMIN — ACETAMINOPHEN 1000 MG: 500 TABLET ORAL at 09:06

## 2023-07-05 RX ADMIN — DEXAMETHASONE 4 MG: 4 TABLET ORAL at 09:06

## 2023-07-05 RX ADMIN — ATORVASTATIN CALCIUM 10 MG: 10 TABLET, FILM COATED ORAL at 09:06

## 2023-07-05 RX ADMIN — PANTOPRAZOLE SODIUM 40 MG: 40 TABLET, DELAYED RELEASE ORAL at 15:55

## 2023-07-05 RX ADMIN — POLYETHYLENE GLYCOL 3350 17 G: 17 POWDER, FOR SOLUTION ORAL at 09:06

## 2023-07-05 RX ADMIN — ACETAMINOPHEN 1000 MG: 500 TABLET ORAL at 21:29

## 2023-07-05 RX ADMIN — PANTOPRAZOLE SODIUM 40 MG: 40 TABLET, DELAYED RELEASE ORAL at 06:12

## 2023-07-05 RX ADMIN — ACETAMINOPHEN 1000 MG: 500 TABLET ORAL at 15:55

## 2023-07-05 RX ADMIN — SENNOSIDES AND DOCUSATE SODIUM 1 TABLET: 50; 8.6 TABLET ORAL at 21:29

## 2023-07-05 RX ADMIN — TAMSULOSIN HYDROCHLORIDE 0.4 MG: 0.4 CAPSULE ORAL at 09:07

## 2023-07-05 ASSESSMENT — ACTIVITIES OF DAILY LIVING (ADL)
ADLS_ACUITY_SCORE: 33
ADLS_ACUITY_SCORE: 37
ADLS_ACUITY_SCORE: 31
ADLS_ACUITY_SCORE: 33
ADLS_ACUITY_SCORE: 31
ADLS_ACUITY_SCORE: 37
ADLS_ACUITY_SCORE: 35
ADLS_ACUITY_SCORE: 33
ADLS_ACUITY_SCORE: 33
ADLS_ACUITY_SCORE: 37
ADLS_ACUITY_SCORE: 33
ADLS_ACUITY_SCORE: 37

## 2023-07-05 NOTE — PROVIDER NOTIFICATION
MD Notification    Notified Person: Hem/Onc    Notified Person Name: Dr. Green    Notification Date/Time: 07/05/23 1236    Notification Interaction: paged    Purpose of Notification: paged regarding biopsy results    Orders Received:    Comments: MD aware. This will be addressed tomorrow.

## 2023-07-05 NOTE — PLAN OF CARE
Goal Outcome Evaluation:     7/4/23  1900-0730H  A/Ox4. VSS, RA. Prefers to read lips while communicating due to hearing impairment. A1, sit to stand with binu steady. Mechanical soft diet with good appetite. Downing in place with adequate UO- for TOV possibly today. No BM this shift. No PIV-MD aware. C/o lower back pain, Oxy PRN PO given 1x.K Protocol, repeat labs this am. For palliative spine radiation this morning. Biopsy from stomach pending. PT/OT/Hem/Onc following. Discharge pending.

## 2023-07-05 NOTE — PLAN OF CARE
1738-2024  VSS on RA. Denied pain except pre-medicated with oxycodone for radiation but unable to receive dose due to machine issues. Salina removed- DTV. NO IV. Up A1, binu mcknight. Biopsy results back- results to be discussed tomorrow with patient/spouse.

## 2023-07-05 NOTE — PROGRESS NOTES
No new recs from oncology, path is still pending. If pt clinically stable and done with radiation he can be discharged and followed up in the outpatient setting but we will continue to return to check to see if the pathology is back we can discuss prognosis while he is in the hospital.    Nick Green MD

## 2023-07-05 NOTE — PROGRESS NOTES
EGD/EUS pathology still pending- will update chart when available    Amelia HARO, [facility]  [phone]

## 2023-07-05 NOTE — PROGRESS NOTES
[facility]    Internal Medicine Hospitalist Progress Note  07/05/2023  I evaluated patient on the above date.            Assessment & Plan     Enrique Dumas is a 67 year old male with history including HTN; HLD; and congenital [place]; who presented 6/27/2023 with right leg swelling with recent back pain. 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     On initially evaluation, pt was afebrile, hemodynamically stable, not hypoxic. ECG showed SR with NSTWA inferior limb leads, no acute ischemic changes. Labs notable for CBC normal; BMP with sodium 135, potassium 2.7; LFT's and lipase normal; trop negative; d-dimer 0.69; UA negative for signs of infection.    Right lower extremity US 6/28 negative for DVT.     CT CAP 6/28 showed no evidence for pulmonary emboli; proximal gastric wall thickening concerning for an infiltrative or neoplastic process which included a soft tissue mass along the posterior margin of the proximal stomach confluent with the adjacent spleen and pancreatic tail; sclerotic changes involving the T10 -T12 vertebral bodies with destructive changes of T12 and abnormal surrounding paravertebral soft tissue extending into the posterior mediastinum, findings also concerning for a neoplastic process; indeterminate 9 mm pulmonary nodule right lung apex could represent a metastatic lesion; hypodense right hepatic lobe lesion concerning for metastasis. Splenic metastasis also possible.    MRI C-spine and L-spine 6/27 negative for acute findings. MRI T-spine 6/27 showed diffuse infiltrative process of the bone marrow of the thoracic spine both anteriorly and posteriorly with extraosseous extension a diffuse involvement of the neural foramen consistent with diffuse metastatic disease; extraosseous extension leads canal compromise and neural foraminal narrowing from the T8 to the T10-T11 disc space level; no abnormal signal thoracic  spinal cord; prominent involvement of the posterior ribs in the thoracic region with extension into the soft tissues.      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  RLE pain and swelling, suspect related to above.  Back pain and abdominal pain related to above.  * Initial presentation and imaging as above. Pt presented with right lower extremity edema but had been having back pain since the beginning of the year; also had c/o abdominal pain. GI and Oncology consulted on admit. Started on IV pantoprazole on admit.  * On 6/28, pt c/o worsened right thigh pain and swelling. CT thigh did not show any acute findings. Underwent EGD that showed normal esophagus; enlarged gastric folds with hemorrhage, biopsied; erosive gastropathy with no bleeding and no stigmata of recent bleeding; normal duodenal bulb, first portion of the duodenum and second portion of the duodenum. 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 Seen by Oncology and was started on dexamethasone.   * On 6/29, still with back pain, but thigh pain and swelling improved. Seen by Radiation Oncology and started on palliative spine radiation. Started scheduled acetaminophen and lidocaine patch.  * On 6/30,  symptoms improved.  - Follow-up path results - Dr. Lord reaching out to Pathologist.  - Continue palliative spine radiation 4 out of 5 today per Radiation Oncology   - Oncology following, plan palliative chemotherapy and outpatient PET scan pending path results.  - Continue dexamethasone 4 mg q12h.  - Continue acetaminophen 1000 mg TID and lidocaine patch.  - Continue PRN oxycodone 5-10 mg q4h. PRN cyclobenzaprine and PRN IV hydromorphone; minimize opioids as able.  - Continue to keep right lower extremity elevated.  - Appreciate consultant help.  Pathology biopsy results are still pending  Patient to receive last dose of radiation therapy on 7/5/2023    Erosive gastropathy on EGD.  * Initial presentation as above. Started on pantoprazole on admit.  * EGD 6/28 as above.  - Continue pantoprazole .    Urinary retention, question from immobility, possible BPH.  Hematuria, suspect related to catheter trauma.  * On 6/28, pt noted with urinary retention requiring multiple SIC.  * On 6/29, started on tamsulosin. Continued to have retention with pain/discomfort with multiple straight catheterizations and Downing placed. Subsequently had bloody urine noted. Urology consulted.  - Continue Downing for 10-14d (Urology advised TOV no sooner than 7/5).  - Continue PRN irrigation.  - Continue tamsulosin.  - Follow-up with [facility] outpatient, appreciate help.  Voiding trial ordered on 7/5/2023    Constipation;  Patient is receiving MiraLAX daily but was unable to have bowel movement  Needing as needed Dulcolax suppository    Indeterminate pulmonary nodule.  * Initial presentation as above. CT also showed an indeterminate 9 mm pulmonary nodule right lung apex, could represent a metastatic lesion.  - PET CT outpatient as above.    Weakness and physical deconditioning due to multiple acute medical issues.  * Pt with significant weakness with difficulty walking due to above issues.  * Pt had an assisted fall and PT and OT consulted  "6/28.  - Continue PT and OT.  - Continue to treat other issues as noted.    Patient is still not able to walk independently needs to discharge to TCU.  Discussed with  on 7/5/2023    Hypertension (benign essential).  PTA: valsartan-HCTZ 320-25 mg daily.  * Valsartan-HCTZ held on admit due to hypokalemia.  - Continue to hold valsartan-HCTZ.    Hyponatremia, suspect nutritional/hypovolemia.  * Sodium 135 on admit. Pt started on LR on admit.  * IVF's completed 6/28.  Recent Labs   Lab 07/04/23  0620 06/30/23  0752 06/29/23  0727   * 135* 134*   - Continue to monitor BMP.    Hypokalemia.  * Potassium low on admit, suspect due to decreased PO intake and diuretic. PTA diuretic held on admit.  Recent Labs   Lab 07/05/23  0650 07/04/23  0620 07/03/23  0717 07/02/23  0806 07/01/23  0650 06/30/23  0752   POTASSIUM 5.2 4.4 4.2 3.9 3.9 3.5   - Continue PRN K replacement.    Hyperlipidemia/dyslipidemia.  - Continue atorvastatin.      Clinically Significant Risk Factors                         # Obesity: Estimated body mass index is 32.41 kg/m  as calculated from the following:    Height as of this encounter: 1.803 m (5' 11\").    Weight as of this encounter: 105.4 kg (232 lb 5.8 oz).             COVID-19 testing.  COVID-19 PCR Results        9/20/2021    10:02   COVID-19 PCR Results   COVID-19 Virus by PCR (External Result) Negative        Details          This result is from an external source.         COVID-19 Antibody Results, Testing for Immunity         No data to display                Diet: Mechanical/Dental Soft Diet  Room Service  Snacks/Supplements Adult: Other; pt may order supplements prn; With Meals  Snacks/Supplements Adult: Other; vanilla [place] packet with dinner meal; With Meals    Prophylaxis: PCD's, ambulation.   Downing Catheter: PRESENT, indication: Retention  Lines: None     Code Status: Full Code    Disposition Plan   Expected discharge: 2-3days recommended to prior living arrangement " "pending continued radiation treatments, improved mobility.  Entered: Maria Guadalupe Clemente MD 07/05/2023, 12:31 PM       Communication.  - I d/w pt's wife 7/5/23 .    Interval History      Pain and mobility better.   Denies any acute new complaints. Had a bm with suppository yesterday. Voiding trial today     -Data reviewed today: I reviewed all new labs and imaging over the last 24 hours. I personally reviewed no images or EKG's today.    Physical Exam    , Blood pressure (!) 149/88, pulse 71, temperature 97.3  F (36.3  C), temperature source Axillary, resp. rate 18, height 1.803 m (5' 11\"), weight 105.4 kg (232 lb 5.8 oz), SpO2 96 %. O2 Device: None (Room air)    Vitals:    06/27/23 1845 06/28/23 1659 07/04/23 0652   Weight: 104.3 kg (230 lb) 102.7 kg (226 lb 6.6 oz) 105.4 kg (232 lb 5.8 oz)     Vital Signs with Ranges  Temp:  [97.3  F (36.3  C)-98.8  F (37.1  C)] 97.3  F (36.3  C)  Pulse:  [71-85] 71  Resp:  [18] 18  BP: (133-149)/(77-88) 149/88  SpO2:  [96 %-97 %] 96 %  Patient Vitals for the past 24 hrs:   BP Temp Temp src Pulse Resp SpO2   07/05/23 0742 (!) 149/88 97.3  F (36.3  C) Axillary 71 18 96 %   07/04/23 2218 137/85 -- -- -- -- --   07/04/23 2156 -- 97.7  F (36.5  C) Oral 78 18 97 %   07/04/23 1541 133/77 98.8  F (37.1  C) Oral 85 18 96 %     I/O's Last 24 hours  I/O last 3 completed shifts:  In: 960 [P.O.:960]  Out: 4200 [Urine:4200]    Constitutional: Awake, alert, oriented, pleasant, conversant.  Respiratory: Diminished in bases. No crackles or wheezes.  Cardiovascular: RRR, no m/r/g.  GI:   Skin/Integumen:   Other:        Data    Labs reviewed.  Recent Labs   Lab 07/05/23  0650 07/04/23  0620 07/03/23  0717 07/01/23  0650 06/30/23  0752 06/29/23  0727   WBC  --  9.4  --   --   --  6.8   HGB  --  14.1  --   --   --  15.3   MCV  --  87  --   --   --  87   PLT  --  227  --   --   --  261   NA  --  135*  --   --  135* 134*   POTASSIUM 5.2 4.4 4.2   < > 3.5 3.6   CHLORIDE  --  100  --   --  96* 96*   CO2  --  " 25  --   --  26 23   BUN  --  28.8*  --   --  20.0 14.1   CR  --  0.69  --   --  0.66* 0.65*   ANIONGAP  --  10  --   --  13 15   ESTEFANY  --  9.3  --   --  9.5 9.9   GLC  --  129*  --   --  114* 122*    < > = values in this interval not displayed.     Recent Labs   Lab Test 06/27/23  1856   TROPONIN T HIGH SENSITIVITY 7     Recent Labs   Lab 07/04/23 0620 06/30/23  0752 06/29/23  0727   * 114* 122*     No lab results found.    No results for input(s): INR, [id number] in the last 168 hours.  Recent Labs   Lab 07/04/23 0620 06/29/23 0727   WBC 9.4 6.8       MICRO:  CULTURES (INCLUDING BLOOD AND URINE):  No lab results found in last 7 days.    No results found for this or any previous visit (from the past 24 hour(s)).    Medications   All medications were reviewed.    Infusions:    Scheduled Medications:    acetaminophen  1,000 mg Oral TID     atorvastatin  10 mg Oral Daily     dexamethasone  4 mg Oral Q12H MADHAVI (08/20)     pantoprazole  40 mg Oral BID AC     polyethylene glycol  17 g Oral Daily     senna-docusate  1 tablet Oral At Bedtime     sodium chloride (PF)  3 mL Intracatheter Q8H     tamsulosin  0.4 mg Oral Daily     PRN Medications:  benzonatate, bisacodyl, cyclobenzaprine, guaiFENesin-dextromethorphan, HYDROmorphone, melatonin, naloxone **OR** naloxone **OR** naloxone **OR** naloxone, ondansetron **OR** ondansetron, oxyCODONE, simethicone, sodium chloride (PF)

## 2023-07-05 NOTE — CONSULTS
Care Management Initial Consult    General Information  Assessment completed with: Patient, Family, patient, spouse       Primary Care Provider verified and updated as needed: No   Readmission within the last 30 days: no previous admission in last 30 days         Advance Care Planning:            Communication Assessment  Patient's communication style: spoken language (English or Bilingual)    Hearing Difficulty or Deaf: yes   Wear Glasses or Blind: yes    Cognitive  Cognitive/Neuro/Behavioral: WDL  Level of Consciousness: alert  Arousal Level: opens eyes spontaneously  Orientation: oriented x 4  Mood/Behavior: calm, cooperative  Best Language: 0 - No aphasia  Speech: garbled, spontaneous, logical    Living Environment:   People in home: spouse     Current living Arrangements: house      Able to return to prior arrangements: yes       Family/Social Support:  Care provided by: self  Provides care for: no one                Description of Support System: Supportive, Involved         Current Resources:   Patient receiving home care services: No     Community Resources: None  Equipment currently used at home: none  Supplies currently used at home: None    Employment/Financial:  Employment Status: employed full-time        Financial Concerns: No concerns identified           Does the patient's insurance plan have a 3 day qualifying hospital stay waiver?  No    Lifestyle & Psychosocial Needs:  Social Determinants of Health     Tobacco Use: Not on file   Alcohol Use: Not on file   Financial Resource Strain: Not on file   Food Insecurity: Not on file   Transportation Needs: Not on file   Physical Activity: Not on file   Stress: Not on file   Social Connections: Not on file   Intimate Partner Violence: Not on file   Depression: Not on file   Housing Stability: Not on file       Functional Status:  Prior to admission patient needed assistance:          Mental Health Status:          Chemical Dependency Status:                 Values/Beliefs:  Spiritual, Cultural Beliefs, Taoism Practices, Values that affect care: no       Cultural/Taoism Practices Patient Routinely Participates In:  (N/A)  Values/Beliefs Comment: No Spiritism listed    Additional Information:  AVTAR Hollins and AVTAR Jennings met with patient and spouse to introduce themselves and explain their role. Patient is a 67 year old male with congenital [place], history of hypertension and hyperlipidemia who presents with right leg swelling. He comes from [place], [place] where he resides in a home with his spouse.  AVTAR Jennings explained recommendations for TCU after this hospitalization and patient and family are agreeable to this. Patient is awaiting biopsy results and may need treatment, which would need to be held during a TCU stay. Due to this, spouse is interested in a swing bed at [facility].     TCU referrals (in order of preference) sent to:  - [facility]  - [facility] (not in DOD, faxed to [phone])   - [facility]  - [facility]     AVTAR Jennings contacted [facility] and left a message inquiring about a TCU/swing bed at this location.     Gunjan Staples, JUAN, LGSW   Social Work   [facility]

## 2023-07-05 NOTE — PROGRESS NOTES
Pt came down to radiation therapy department, but didn't receive a treatment today due to our treatment machine being down. Pt didn't want to wait. Will resume final treatment 7/6/23

## 2023-07-06 ENCOUNTER — TRANSFERRED RECORDS (OUTPATIENT)
Dept: HEALTH INFORMATION MANAGEMENT | Facility: CLINIC | Age: 68
End: 2023-07-06

## 2023-07-06 ENCOUNTER — APPOINTMENT (OUTPATIENT)
Dept: CARDIOLOGY | Facility: CLINIC | Age: 68
DRG: 824 | End: 2023-07-06
Attending: INTERNAL MEDICINE
Payer: MEDICARE

## 2023-07-06 ENCOUNTER — APPOINTMENT (OUTPATIENT)
Dept: OCCUPATIONAL THERAPY | Facility: CLINIC | Age: 68
DRG: 824 | End: 2023-07-06
Payer: MEDICARE

## 2023-07-06 DIAGNOSIS — C83.32 DIFFUSE LARGE B-CELL LYMPHOMA OF INTRATHORACIC LYMPH NODES (H): ICD-10-CM

## 2023-07-06 PROBLEM — C83.30 DLBCL (DIFFUSE LARGE B CELL LYMPHOMA) (H): Status: ACTIVE | Noted: 2023-07-06

## 2023-07-06 LAB
ALBUMIN SERPL BCG-MCNC: 3.6 G/DL (ref 3.5–5.2)
ALP SERPL-CCNC: 66 U/L (ref 40–129)
ALT SERPL W P-5'-P-CCNC: 25 U/L (ref 0–70)
ANION GAP SERPL CALCULATED.3IONS-SCNC: 13 MMOL/L (ref 7–15)
AST SERPL W P-5'-P-CCNC: 18 U/L (ref 0–45)
BILIRUB SERPL-MCNC: 0.3 MG/DL
BUN SERPL-MCNC: 30.4 MG/DL (ref 8–23)
CALCIUM SERPL-MCNC: 9.4 MG/DL (ref 8.8–10.2)
CHLORIDE SERPL-SCNC: 104 MMOL/L (ref 98–107)
CREAT SERPL-MCNC: 0.72 MG/DL (ref 0.67–1.17)
DEPRECATED HCO3 PLAS-SCNC: 21 MMOL/L (ref 22–29)
GFR SERPL CREATININE-BSD FRML MDRD: >90 ML/MIN/1.73M2
GLUCOSE SERPL-MCNC: 100 MG/DL (ref 70–99)
HBV SURFACE AB SERPL IA-ACNC: 0.37 M[IU]/ML
HBV SURFACE AB SERPL IA-ACNC: NONREACTIVE M[IU]/ML
HBV SURFACE AG SERPL QL IA: NONREACTIVE
LDH SERPL L TO P-CCNC: 209 U/L (ref 0–250)
LVEF ECHO: NORMAL
POTASSIUM SERPL-SCNC: 4.8 MMOL/L (ref 3.4–5.3)
POTASSIUM SERPL-SCNC: 5 MMOL/L (ref 3.4–5.3)
PROT SERPL-MCNC: 6.3 G/DL (ref 6.4–8.3)
SODIUM SERPL-SCNC: 138 MMOL/L (ref 136–145)
URATE SERPL-MCNC: 3.4 MG/DL (ref 3.4–7)

## 2023-07-06 PROCEDURE — 97535 SELF CARE MNGMENT TRAINING: CPT | Mod: GO

## 2023-07-06 PROCEDURE — 99233 SBSQ HOSP IP/OBS HIGH 50: CPT | Performed by: INTERNAL MEDICINE

## 2023-07-06 PROCEDURE — 36415 COLL VENOUS BLD VENIPUNCTURE: CPT | Performed by: INTERNAL MEDICINE

## 2023-07-06 PROCEDURE — 77336 RADIATION PHYSICS CONSULT: CPT

## 2023-07-06 PROCEDURE — 87340 HEPATITIS B SURFACE AG IA: CPT | Performed by: INTERNAL MEDICINE

## 2023-07-06 PROCEDURE — 97530 THERAPEUTIC ACTIVITIES: CPT | Mod: GO

## 2023-07-06 PROCEDURE — 250N000013 HC RX MED GY IP 250 OP 250 PS 637: Performed by: INTERNAL MEDICINE

## 2023-07-06 PROCEDURE — 255N000002 HC RX 255 OP 636: Performed by: INTERNAL MEDICINE

## 2023-07-06 PROCEDURE — 77412 RADIATION TX DELIVERY LVL 3: CPT

## 2023-07-06 PROCEDURE — 77387 GUIDANCE FOR RADJ TX DLVR: CPT

## 2023-07-06 PROCEDURE — 83615 LACTATE (LD) (LDH) ENZYME: CPT | Performed by: INTERNAL MEDICINE

## 2023-07-06 PROCEDURE — 250N000012 HC RX MED GY IP 250 OP 636 PS 637: Performed by: INTERNAL MEDICINE

## 2023-07-06 PROCEDURE — 84132 ASSAY OF SERUM POTASSIUM: CPT | Performed by: INTERNAL MEDICINE

## 2023-07-06 PROCEDURE — 93306 TTE W/DOPPLER COMPLETE: CPT | Mod: 26 | Performed by: INTERNAL MEDICINE

## 2023-07-06 PROCEDURE — 999N000208 ECHOCARDIOGRAM COMPLETE

## 2023-07-06 PROCEDURE — 250N000013 HC RX MED GY IP 250 OP 250 PS 637: Performed by: PHYSICIAN ASSISTANT

## 2023-07-06 PROCEDURE — 999N000128 HC STATISTIC PERIPHERAL IV START W/O US GUIDANCE

## 2023-07-06 PROCEDURE — 86704 HEP B CORE ANTIBODY TOTAL: CPT | Performed by: INTERNAL MEDICINE

## 2023-07-06 PROCEDURE — 86706 HEP B SURFACE ANTIBODY: CPT | Performed by: INTERNAL MEDICINE

## 2023-07-06 PROCEDURE — 120N000001 HC R&B MED SURG/OB

## 2023-07-06 PROCEDURE — 84550 ASSAY OF BLOOD/URIC ACID: CPT | Performed by: INTERNAL MEDICINE

## 2023-07-06 PROCEDURE — 93306 TTE W/DOPPLER COMPLETE: CPT

## 2023-07-06 RX ORDER — PROCHLORPERAZINE MALEATE 5 MG
5-10 TABLET ORAL EVERY 6 HOURS PRN
Status: CANCELLED
Start: 2023-07-06

## 2023-07-06 RX ORDER — VALSARTAN 80 MG/1
80 TABLET ORAL DAILY
Status: DISCONTINUED | OUTPATIENT
Start: 2023-07-06 | End: 2023-07-10 | Stop reason: HOSPADM

## 2023-07-06 RX ORDER — ALBUTEROL SULFATE 0.83 MG/ML
2.5 SOLUTION RESPIRATORY (INHALATION)
Status: CANCELLED | OUTPATIENT
Start: 2023-07-06

## 2023-07-06 RX ORDER — MEPERIDINE HYDROCHLORIDE 25 MG/ML
25 INJECTION INTRAMUSCULAR; INTRAVENOUS; SUBCUTANEOUS EVERY 30 MIN PRN
Status: CANCELLED | OUTPATIENT
Start: 2023-07-06

## 2023-07-06 RX ORDER — ALBUTEROL SULFATE 90 UG/1
1-2 AEROSOL, METERED RESPIRATORY (INHALATION)
Status: CANCELLED
Start: 2023-07-06

## 2023-07-06 RX ORDER — CEFAZOLIN SODIUM 2 G/100ML
2 INJECTION, SOLUTION INTRAVENOUS
Status: COMPLETED | OUTPATIENT
Start: 2023-07-07 | End: 2023-07-07

## 2023-07-06 RX ORDER — ENOXAPARIN SODIUM 100 MG/ML
40 INJECTION SUBCUTANEOUS EVERY 24 HOURS
Status: DISCONTINUED | OUTPATIENT
Start: 2023-07-07 | End: 2023-07-10 | Stop reason: HOSPADM

## 2023-07-06 RX ORDER — ONDANSETRON 8 MG/1
16 TABLET, FILM COATED ORAL ONCE
Status: CANCELLED | OUTPATIENT
Start: 2023-07-06

## 2023-07-06 RX ORDER — DIPHENHYDRAMINE HCL 50 MG
50 CAPSULE ORAL ONCE
Status: CANCELLED
Start: 2023-07-06

## 2023-07-06 RX ORDER — PREDNISONE 50 MG/1
100 TABLET ORAL DAILY
Status: CANCELLED
Start: 2023-07-06

## 2023-07-06 RX ORDER — ACETAMINOPHEN 325 MG/1
650 TABLET ORAL ONCE
Status: CANCELLED
Start: 2023-07-06

## 2023-07-06 RX ORDER — DOXORUBICIN HYDROCHLORIDE 2 MG/ML
50 INJECTION, SOLUTION INTRAVENOUS ONCE
Status: CANCELLED | OUTPATIENT
Start: 2023-07-06

## 2023-07-06 RX ORDER — LORAZEPAM 0.5 MG/1
.5-1 TABLET ORAL EVERY 6 HOURS PRN
Status: CANCELLED
Start: 2023-07-06

## 2023-07-06 RX ORDER — METHYLPREDNISOLONE SODIUM SUCCINATE 125 MG/2ML
125 INJECTION, POWDER, LYOPHILIZED, FOR SOLUTION INTRAMUSCULAR; INTRAVENOUS
Status: CANCELLED
Start: 2023-07-06

## 2023-07-06 RX ORDER — LORAZEPAM 2 MG/ML
.5-1 INJECTION INTRAMUSCULAR EVERY 6 HOURS PRN
Status: CANCELLED | OUTPATIENT
Start: 2023-07-06

## 2023-07-06 RX ORDER — EPINEPHRINE 1 MG/ML
0.3 INJECTION, SOLUTION, CONCENTRATE INTRAVENOUS EVERY 5 MIN PRN
Status: CANCELLED | OUTPATIENT
Start: 2023-07-06

## 2023-07-06 RX ORDER — ALLOPURINOL 300 MG/1
300 TABLET ORAL DAILY
Status: CANCELLED
Start: 2023-07-06

## 2023-07-06 RX ORDER — DIPHENHYDRAMINE HYDROCHLORIDE 50 MG/ML
50 INJECTION INTRAMUSCULAR; INTRAVENOUS
Status: CANCELLED
Start: 2023-07-06

## 2023-07-06 RX ORDER — PREDNISONE 50 MG/1
100 TABLET ORAL DAILY
Status: COMPLETED | OUTPATIENT
Start: 2023-07-06 | End: 2023-07-10

## 2023-07-06 RX ORDER — DEXTROSE MONOHYDRATE 50 MG/ML
10-20 INJECTION, SOLUTION INTRAVENOUS
Status: CANCELLED | OUTPATIENT
Start: 2023-07-07

## 2023-07-06 RX ADMIN — ACETAMINOPHEN 1000 MG: 500 TABLET ORAL at 22:34

## 2023-07-06 RX ADMIN — ACETAMINOPHEN 1000 MG: 500 TABLET ORAL at 16:01

## 2023-07-06 RX ADMIN — HUMAN ALBUMIN MICROSPHERES AND PERFLUTREN 3 ML: 10; .22 INJECTION, SOLUTION INTRAVENOUS at 14:22

## 2023-07-06 RX ADMIN — ACETAMINOPHEN 1000 MG: 500 TABLET ORAL at 08:56

## 2023-07-06 RX ADMIN — VALSARTAN 80 MG: 80 TABLET, FILM COATED ORAL at 14:30

## 2023-07-06 RX ADMIN — PANTOPRAZOLE SODIUM 40 MG: 40 TABLET, DELAYED RELEASE ORAL at 08:56

## 2023-07-06 RX ADMIN — DEXAMETHASONE 4 MG: 4 TABLET ORAL at 08:57

## 2023-07-06 RX ADMIN — PANTOPRAZOLE SODIUM 40 MG: 40 TABLET, DELAYED RELEASE ORAL at 16:01

## 2023-07-06 RX ADMIN — PREDNISONE 100 MG: 50 TABLET ORAL at 16:01

## 2023-07-06 RX ADMIN — ATORVASTATIN CALCIUM 10 MG: 10 TABLET, FILM COATED ORAL at 08:57

## 2023-07-06 RX ADMIN — TAMSULOSIN HYDROCHLORIDE 0.4 MG: 0.4 CAPSULE ORAL at 08:56

## 2023-07-06 RX ADMIN — OXYCODONE HYDROCHLORIDE 5 MG: 5 TABLET ORAL at 07:23

## 2023-07-06 ASSESSMENT — ACTIVITIES OF DAILY LIVING (ADL)
ADLS_ACUITY_SCORE: 31
ADLS_ACUITY_SCORE: 35
ADLS_ACUITY_SCORE: 31
ADLS_ACUITY_SCORE: 35
ADLS_ACUITY_SCORE: 31
ADLS_ACUITY_SCORE: 35

## 2023-07-06 NOTE — CONSULTS
Patient is on IR schedule Friday 7/7/23 for a Port a catheter placement with IV moderate sedation at 9-930am.     Enrique has an allergy/rash to lidocaine patches. He has received dental injected numbing medication without any issues so subcutaneous lidocaine will be used during the port placement.     -Labs WNL for procedure.    -Orders for NPO and antibiotics have been entered.   -Procedural education reviewed with patient and wife in detail including, but not limited to risks, benefits and alternatives, questions answered with understanding verbalized by both.and consent is in IR.     Please contact the IR department at [phone] for procedural related questions.     Total time: 35 minutes    Thanks, Ruthy [facility] Interventional Radiology CNP ([phone]) (phone [phone])

## 2023-07-06 NOTE — PROGRESS NOTES
Radiation treatment # 5 to T-Spine, 2000 cGy total dose to date delivered with 10 MV Photons.  Patient has completed his course of Radiation Therapy.  SE  RTT

## 2023-07-06 NOTE — PLAN OF CARE
A/O x4, VSS on RA. C/o back pain, PRN PO oxy given x1. Denies N/V. Up A1 with binu steady. Continent of bowel and Incontinent of bladder at times, pt passed voiding test with adequate urine output,had 1 large BM this shift. Last radiation session to be completed today. Biopsy results back- results discussed with pt and family per MD note, ongoing discussion about treatment plan. Discharge Pending

## 2023-07-06 NOTE — PLAN OF CARE
Goal Outcome Evaluation:    7/6/23   [id number]     Pt hypertensive, otherwise all VSS on RA. No pain reported during shift. A&Ox4, speech garbled. 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 Tolerating mechanical soft diet well. Denies nausea or vomiting. Left PIV SL. Patient occasionally incontinent of B/B. One BM during shift. Bladder scanned patient per orders for over 700 ml. Patient could not void, straight cath for 750 ml. Patient is up to the commode with 1-2 and sera steady. Plan is for patient to go to IR tomorrow (7/7) at 0900 for port placement. Will be NPO at midnight. After the port is placed patient will begin chemo treatment. Social work following for discharge needs. Will continue with plan of care.

## 2023-07-06 NOTE — PROGRESS NOTES
[facility]    Medicine Progress Note - Hospitalist Service    Date of Admission:  6/27/2023    Assessment & Plan     Enrique Dumas is a 67 year old male with history including HTN; HLD; and congenital [place]; who presented 6/27/2023 with right leg swelling with recent back pain. 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      On initially evaluation, pt was afebrile, hemodynamically stable, not hypoxic. ECG showed SR with NSTWA inferior limb leads, no acute ischemic changes. Labs notable for CBC normal; BMP with sodium 135, potassium 2.7; LFT's and lipase normal; trop negative; d-dimer 0.69; UA negative for signs of infection.     Right lower extremity US 6/28 negative for DVT.      CT CAP 6/28 showed no evidence for pulmonary emboli; proximal gastric wall thickening concerning for an infiltrative or neoplastic process which included a soft tissue mass along the posterior margin of the proximal stomach confluent with the adjacent spleen and pancreatic tail; sclerotic changes involving the T10 -T12 vertebral bodies with destructive changes of T12 and abnormal surrounding paravertebral soft tissue extending into the posterior mediastinum, findings also concerning for a neoplastic process; indeterminate 9 mm pulmonary nodule right lung apex could represent a metastatic lesion; hypodense right hepatic lobe lesion concerning for metastasis. Splenic metastasis also possible.     MRI C-spine and L-spine 6/27 negative for acute findings. MRI T-spine 6/27 showed diffuse infiltrative process of the bone marrow of the thoracic spine both anteriorly and posteriorly with extraosseous extension a diffuse involvement of the neural foramen consistent with diffuse metastatic disease; extraosseous extension leads canal compromise and neural foraminal narrowing from the T8 to the T10-T11 disc space level; no abnormal signal thoracic spinal cord; prominent  involvement of the posterior ribs in the thoracic region with extension into the soft tissues.     1.  Large, B-cell lymphoma  RLE pain and swelling, suspect related to above.  Back pain and abdominal pain related to above.    * Initial presentation and imaging as above. Pt presented with right lower extremity edema but had been having back pain since the beginning of the year; also had c/o abdominal pain. GI and Oncology consulted on admit. Started on IV pantoprazole on admit.  * On 6/28, pt c/o worsened right thigh pain and swelling. CT thigh did not show any acute findings. Underwent EGD that showed normal esophagus; enlarged gastric folds with hemorrhage, biopsied; erosive gastropathy with no bleeding and no stigmata of recent bleeding; normal duodenal bulb, first portion of the duodenum and second portion of the duodenum. 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 Seen by Oncology and was started on dexamethasone.   * On 6/29, still with back pain, but thigh pain and swelling improved. Seen by Radiation Oncology and started on palliative spine radiation. Started scheduled acetaminophen and lidocaine patch.  * On 6/30, symptoms improved.    - 7/5/23 -Dr. Lord confirmed that path revealed diffuse, large B-cell lymphoma (Stage IV) and spoke with pt and family    Awaiting  Oncology rounds today (Dr. Green) for further recommendation on treatment and plan of care    - Continue palliative spine radiation per Radiation Oncology  (tolerating)    - Continue dexamethasone 4 mg q12h.  - Continue acetaminophen 1000 mg TID and lidocaine patch.  - Continue PRN oxycodone 5-10 mg q4h. PRN cyclobenzaprine and PRN IV hydromorphone; minimize opioids as able.  - Continue to keep right lower extremity elevated.  - Appreciate consultant help.  Pathology biopsy results are still pending  Patient to receive last dose of radiation therapy on 7/5/2023     2. Erosive gastropathy on EGD.  * Initial presentation as above. Started on pantoprazole on admit.  * EGD 6/28 as above.  - Continue pantoprazole .     3. Urinary retention, question from immobility, possible BPH.  Hematuria, suspect related to catheter trauma.  * On 6/28, pt noted with urinary retention requiring multiple SIC.  * On 6/29, started on tamsulosin. Continued to have retention with pain/discomfort with multiple straight catheterizations and Wick placed. Subsequently had bloody urine noted. Urology consulted.  - Continue Wick for 10-14d (Urology advised TOV no sooner than 7/5).  - Continue PRN irrigation.  - Continue tamsulosin.  - Follow-up with [facility] outpatient, appreciate help.  Voiding trial ordered on 7/5/2023     Addendum - [id number]  Paged by nursing that he is having persistent difficulty again with urinary retention since wick removed for voiding trial 7/5/23.  [facility] consulted 6/30/23 - have asked if they can be paged for any further recommendations.  Will place new urology consult if needed.  D/W bedside RN.    4. Constipation;  Patient is receiving MiraLAX daily but was unable to have bowel movement  Needing as needed Dulcolax suppository     5. Indeterminate pulmonary nodule.  * Initial presentation as above. CT also showed an indeterminate 9 mm pulmonary nodule right lung apex, could represent a metastatic lesion.  - PET  "CT outpatient as above.     6. Weakness and physical deconditioning due to multiple acute medical issues.  * Pt with significant weakness with difficulty walking due to above issues.  * Pt had an assisted fall and PT and OT consulted 6/28.  - Continue PT and OT.  - Continue to treat other issues as noted.     Patient is still not able to walk independently needs to discharge to TCU.    SW working on disposition    7. Hypertension (benign essential).  PTA: valsartan-HCTZ 320-25 mg daily.  * Valsartan-HCTZ held on admit due to hypokalemia.  - Continue to hold valsartan-HCTZ.    SBP more elevated - will resume smaller dose of valsartan today and monitor closely     8. Hyponatremia, suspect nutritional/hypovolemia.  * Sodium 135 on admit. Pt started on LR on admit.  * IVF's completed 6/28.   Continue to monitor BMP.     9. Hypokalemia.  Supplemented and resolved     10. Hyperlipidemia/dyslipidemia.  - Continue atorvastatin.     Medical Decision Making       50 MINUTES SPENT BY ME on the date of service doing chart review, history, exam, documentation & further activities per the note.        PPE Worn:  Mask, gloves     Diet: Mechanical/Dental Soft Diet  Room Service  Snacks/Supplements Adult: Other; pt may order supplements prn; With Meals  Snacks/Supplements Adult: Other; vanilla [place] packet with dinner meal; With Meals    DVT Prophylaxis: Enoxaparin (Lovenox) subcutaneous - ? Start after port placement today  Downing Catheter: Not present  Lines: None     Cardiac Monitoring: None  Code Status: Full Code      Clinically Significant Risk Factors                         # Obesity: Estimated body mass index is 32.75 kg/m  as calculated from the following:    Height as of this encounter: 1.803 m (5' 11\").    Weight as of this encounter: 106.5 kg (234 lb 12.6 oz).           Disposition Plan     Expected Discharge Date: 07/07/2023                  Lauren Carroll, DO  Hospitalist Service  [facility] " "[facility]  Securely message with Raul (more info)  Text page via [facility] Paging/Directory   ______________________________________________________________________    Interval History     Very [place] so we were able to communicate this am with written words (pen and paper).  Denies CP, SOB, HA, N/V.  Trying to \"exercise\" with lifting his walker above his head this am in his room.      Explained plan for echo and further discussion with Oncology for treatment plan later today.      No new concerns noted per nursing.     Physical Exam   Vital Signs: Temp: 98  F (36.7  C) Temp src: Oral BP: (!) 156/86 (pt states he is \"too excited', request to recheck later) Pulse: 68   Resp: 18 SpO2: 97 % O2 Device: None (Room air)    Weight: 234 lbs 12.64 oz    GEN:  Alert, oriented x 3, appears comfortable, no overt respiratory distress.   HEENT:  Normocephalic/atraumatic, no scleral icterus, no nasal discharge, mouth moist.  CV:  Regular rate and rhythm, somewhat distant but no loud murmur or JVD.  S1 + S2 noted, no S3 or S4.  LUNGS:  Clear to auscultation ant/lat bilaterally without rales/rhonchi/wheezing/retractions.  Symmetric chest rise on inhalation noted.  ABD:  Active bowel sounds, soft, non-tender/non-distended.  No rebound/guarding/rigidity.  EXT:  No significant pretibial edema.  No cyanosis.  No acute joint synovitis noted.  SKIN:  Dry to touch, no exanthems noted in the visualized areas.  PSYCH:  Pleasant, cooperative    Medications       acetaminophen  1,000 mg Oral TID     atorvastatin  10 mg Oral Daily     dexamethasone  4 mg Oral Q12H MADHAVI (08/20)     pantoprazole  40 mg Oral BID AC     polyethylene glycol  17 g Oral Daily     senna-docusate  1 tablet Oral At Bedtime     sodium chloride (PF)  3 mL Intracatheter Q8H     tamsulosin  0.4 mg Oral Daily       Data     Labs and Imaging results below reviewed today.  Recent Labs   Lab 07/04/23  0620   WBC 9.4   HGB 14.1   HCT 41.5   MCV 87        Recent Labs "   Lab 07/06/23  0702 07/05/23 0650 07/04/23 0620 07/01/23 0650 06/30/23  0752   NA  --   --  135*  --  135*   POTASSIUM 5.0 5.2 4.4   < > 3.5   CHLORIDE  --   --  100  --  96*   CO2  --   --  25  --  26   ANIONGAP  --   --  10  --  13   GLC  --   --  129*  --  114*   BUN  --   --  28.8*  --  20.0   CR  --   --  0.69  --  0.66*   GFRESTIMATED  --   --  >90  --  >90   ESTEFANY  --   --  9.3  --  9.5    < > = values in this interval not displayed.     7-Day Micro Results     No results found for the last 168 hours.        Recent Labs   Lab 07/06/23 0702 07/05/23 0650 07/04/23 0620 07/01/23 0650 06/30/23  0752   NA  --   --  135*  --  135*   POTASSIUM 5.0 5.2 4.4   < > 3.5   CHLORIDE  --   --  100  --  96*   CO2  --   --  25  --  26   ANIONGAP  --   --  10  --  13   GLC  --   --  129*  --  114*   BUN  --   --  28.8*  --  20.0   CR  --   --  0.69  --  0.66*   GFRESTIMATED  --   --  >90  --  >90   ESTEFANY  --   --  9.3  --  9.5    < > = values in this interval not displayed.

## 2023-07-06 NOTE — PROGRESS NOTES
Path is back. I reviewed the path with patient and his wife.  It reveals diffuse large B-cell lymphoma.  Explained to them this is not gastric cancer.  It is lymphoma.  It is stage IV disease.    Briefly discussed regarding treatment.  He will be treated with 6 cycles of R-CHOP.    Patient may be going to rehab.  It would be best that he get his first cycle of R-CHOP chemotherapy as inpatient.    Dr. Green will see the patient tomorrow and discuss regarding chemotherapy. As he will be getting adriamycin, will order an echocardiogram.    Total time spent 25 minutes.  Time spent in today's visit, discussing regarding chemotherapy and documentation today.

## 2023-07-06 NOTE — PROVIDER NOTIFICATION
MD Notification    Notified Person: MD    Notified Person Name:  Dr. Carroll     Notification Date/Time: 7/6/23 9244    Notification Interaction: vocera paged    Purpose of Notification: Straight cath with wick for 700ml. Pt states he is not able to urinate. Do you want me to leave wick in?     Orders Received: Leave wick in. Will re consult urology.     Comments: Had straight cath for 700ml this morning, only able to urinate small amount once throughout the day per chart.

## 2023-07-06 NOTE — PROGRESS NOTES
[facility]    Hematology/Oncology Follow-up Note      Today's Date: 07/06/23  Date of Admission:  6/27/2023  Reason for Consult:  DLBCL      ASSESSMENT/PLAN:  Enrique Dumas is a 67 year old male with new dx of DLBCL germinal subtype gastric involvment and spine     Echo pending. 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  We discussed that this is a curable cancer with obviously worse prognosis of patients have spine involvement or double hit, but he still remains curable      -start RCHOP tomorrow, prednisone today. Port placement labs. Discontinue dex   -discussed with patient, long term double hit testing pending may need EPOCH based regimen   -ppi  -add ldh and uric acid, cbc, cmp and hepatitis b s ag prior to Rituxan  -s/p xrt to the spine completed today , last dose  -SW to decide dispo after discussion with patient, if he goes to acute rehab then we may need to keep him here to give neupogen otherwise OP neulasta in clinic      Total time spent on day of visit, including review of tests, obtaining/reviewing separately obtained history, ordering medications/tests/procedures, communicating with PCP/consultants, and documenting in electronic medical record: 50 minutes  Nick Green MD       INTERIM HISTORY:  Doing well. .  He is eating well.  He is unable to walk still has his legs give out.  Physical therapy has not worked with him today.  He has his last radiation treatment today.  Plan is to start the prednisone get the port in and start the rest of the regimen tomorrow.  Labs pending     MEDICATIONS:  Current Facility-Administered Medications   Medication     acetaminophen (TYLENOL) tablet 1,000 mg     atorvastatin (LIPITOR) tablet 10 mg     benzonatate (TESSALON) capsule 100 mg     bisacodyl (DULCOLAX) suppository 10 mg     cyclobenzaprine (FLEXERIL) tablet 5-10  "mg     dexamethasone (DECADRON) tablet 4 mg     guaiFENesin-dextromethorphan (ROBITUSSIN DM) 100-10 MG/5ML syrup 10 mL     HYDROmorphone (PF) (DILAUDID) injection 0.3 mg     melatonin tablet 1 mg     naloxone (NARCAN) injection 0.2 mg    Or     naloxone (NARCAN) injection 0.4 mg    Or     naloxone (NARCAN) injection 0.2 mg    Or     naloxone (NARCAN) injection 0.4 mg     ondansetron (ZOFRAN ODT) ODT tab 4 mg    Or     ondansetron (ZOFRAN) injection 4 mg     oxyCODONE (ROXICODONE) tablet 5-10 mg     pantoprazole (PROTONIX) EC tablet 40 mg     polyethylene glycol (MIRALAX) Packet 17 g     senna-docusate (SENOKOT-S/PERICOLACE) 8.6-50 MG per tablet 1 tablet     simethicone (MYLICON) chewable tablet 80 mg     sodium chloride (PF) 0.9% PF flush 3 mL     sodium chloride (PF) 0.9% PF flush 3 mL     [START ON 2023] sodium chloride 0.9 % bag TABLE SOLN     tamsulosin (FLOMAX) capsule 0.4 mg           ALLERGIES:  Allergies   Allergen Reactions     Lidocaine Rash     Sustained rash from lidocaine patches in past          PHYSICAL EXAM:  Vital signs:  Temp: 97.6  F (36.4  C) Temp src: Oral BP: (!) 148/89 Pulse: 77   Resp: 18 SpO2: 95 % O2 Device: None (Room air)   Height: 180.3 cm (5' 11\") Weight: 106.5 kg (234 lb 12.6 oz)  Estimated body mass index is 32.75 kg/m  as calculated from the following:    Height as of this encounter: 1.803 m (5' 11\").    Weight as of this encounter: 106.5 kg (234 lb 12.6 oz).    ECO  GENERAL/CONSTITUTIONAL: No acute distress.  EYES: Pupils are equal, round, and react to light and accommodation. Extraocular movements intact.  No scleral icterus.  ENT/MOUTH: Neck supple. Oropharynx clear, no mucositis.  LYMPH: No anterior cervical, posterior cervical, supraclavicular, axillary or inguinal adenopathy.   RESPIRATORY: Clear to auscultation bilaterally. No crackles or wheezing.   CARDIOVASCULAR: Regular rate and rhythm without murmurs, gallops, or rubs.  GASTROINTESTINAL: No hepatosplenomegaly, " masses, or tenderness. The patient has normal bowel sounds. No guarding.  No distention.  MUSCULOSKELETAL: Warm and well-perfused, no cyanosis, clubbing, or edema.  NEUROLOGIC: Cranial nerves II-XII are intact. Alert, oriented, answers questions appropriately.  INTEGUMENTARY: No rashes or jaundice.  paitent unable to walk       LABS:  CBC RESULTS:   Recent Labs   Lab Test 07/04/23  0620   WBC 9.4   RBC 4.80   HGB 14.1   HCT 41.5   MCV 87   MCH 29.4   MCHC 34.0   RDW 15.0          Recent Labs   Lab Test 07/06/23  0702 07/05/23  0650 07/04/23  0620 07/01/23  0650 06/30/23  0752   NA  --   --  135*  --  135*   POTASSIUM 5.0 5.2 4.4   < > 3.5   CHLORIDE  --   --  100  --  96*   CO2  --   --  25  --  26   ANIONGAP  --   --  10  --  13   GLC  --   --  129*  --  114*   BUN  --   --  28.8*  --  20.0   CR  --   --  0.69  --  0.66*   ESTEFANY  --   --  9.3  --  9.5    < > = values in this interval not displayed.         PATHOLOGY:  na    IMAGING:  catalina Green MD  Hematology/Oncology  [facility]

## 2023-07-07 ENCOUNTER — APPOINTMENT (OUTPATIENT)
Dept: INTERVENTIONAL RADIOLOGY/VASCULAR | Facility: CLINIC | Age: 68
DRG: 824 | End: 2023-07-07
Attending: INTERNAL MEDICINE
Payer: MEDICARE

## 2023-07-07 ENCOUNTER — TELEPHONE (OUTPATIENT)
Dept: ONCOLOGY | Facility: CLINIC | Age: 68
End: 2023-07-07
Payer: COMMERCIAL

## 2023-07-07 ENCOUNTER — APPOINTMENT (OUTPATIENT)
Dept: OCCUPATIONAL THERAPY | Facility: CLINIC | Age: 68
DRG: 824 | End: 2023-07-07
Payer: MEDICARE

## 2023-07-07 DIAGNOSIS — C83.32 DIFFUSE LARGE B-CELL LYMPHOMA OF INTRATHORACIC LYMPH NODES (H): Primary | ICD-10-CM

## 2023-07-07 LAB
ALBUMIN SERPL BCG-MCNC: 3.5 G/DL (ref 3.5–5.2)
ALP SERPL-CCNC: 61 U/L (ref 40–129)
ALT SERPL W P-5'-P-CCNC: 30 U/L (ref 0–70)
ANION GAP SERPL CALCULATED.3IONS-SCNC: 11 MMOL/L (ref 7–15)
AST SERPL W P-5'-P-CCNC: 16 U/L (ref 0–45)
BASOPHILS # BLD AUTO: 0 10E3/UL (ref 0–0.2)
BASOPHILS NFR BLD AUTO: 0 %
BILIRUB SERPL-MCNC: 0.3 MG/DL
BUN SERPL-MCNC: 31 MG/DL (ref 8–23)
CALCIUM SERPL-MCNC: 9.2 MG/DL (ref 8.8–10.2)
CHLORIDE SERPL-SCNC: 103 MMOL/L (ref 98–107)
CREAT SERPL-MCNC: 0.68 MG/DL (ref 0.67–1.17)
DEPRECATED HCO3 PLAS-SCNC: 24 MMOL/L (ref 22–29)
EOSINOPHIL # BLD AUTO: 0 10E3/UL (ref 0–0.7)
EOSINOPHIL NFR BLD AUTO: 0 %
ERYTHROCYTE [DISTWIDTH] IN BLOOD BY AUTOMATED COUNT: 15.3 % (ref 10–15)
GFR SERPL CREATININE-BSD FRML MDRD: >90 ML/MIN/1.73M2
GLUCOSE SERPL-MCNC: 116 MG/DL (ref 70–99)
HBV CORE AB SERPL QL IA: NONREACTIVE
HCT VFR BLD AUTO: 40.1 % (ref 40–53)
HGB BLD-MCNC: 13.2 G/DL (ref 13.3–17.7)
IGG SERPL-MCNC: 820 MG/DL (ref 610–1616)
IGG1 SER-MCNC: 476 MG/DL (ref 382–929)
IGG2 SER-MCNC: 290 MG/DL (ref 242–700)
IGG3 SER-MCNC: 34 MG/DL (ref 22–176)
IGG4 SER-MCNC: 28 MG/DL (ref 4–86)
IMM GRANULOCYTES # BLD: 0.2 10E3/UL
IMM GRANULOCYTES NFR BLD: 2 %
LYMPHOCYTES # BLD AUTO: 0.3 10E3/UL (ref 0.8–5.3)
LYMPHOCYTES NFR BLD AUTO: 4 %
MCH RBC QN AUTO: 28.9 PG (ref 26.5–33)
MCHC RBC AUTO-ENTMCNC: 32.9 G/DL (ref 31.5–36.5)
MCV RBC AUTO: 88 FL (ref 78–100)
MONOCYTES # BLD AUTO: 0.9 10E3/UL (ref 0–1.3)
MONOCYTES NFR BLD AUTO: 10 %
NEUTROPHILS # BLD AUTO: 7.3 10E3/UL (ref 1.6–8.3)
NEUTROPHILS NFR BLD AUTO: 84 %
NRBC # BLD AUTO: 0 10E3/UL
NRBC BLD AUTO-RTO: 0 /100
PHOSPHATE SERPL-MCNC: 4.7 MG/DL (ref 2.5–4.5)
PLATELET # BLD AUTO: 235 10E3/UL (ref 150–450)
POTASSIUM SERPL-SCNC: 4.5 MMOL/L (ref 3.4–5.3)
PROT SERPL-MCNC: 6.1 G/DL (ref 6.4–8.3)
RBC # BLD AUTO: 4.56 10E6/UL (ref 4.4–5.9)
SODIUM SERPL-SCNC: 138 MMOL/L (ref 136–145)
SUBCLASSES, PERCENT: 101 %
WBC # BLD AUTO: 8.7 10E3/UL (ref 4–11)

## 2023-07-07 PROCEDURE — 250N000011 HC RX IP 250 OP 636: Mod: JZ | Performed by: INTERNAL MEDICINE

## 2023-07-07 PROCEDURE — 272N000116 HC CATH CR1

## 2023-07-07 PROCEDURE — 272N000196 HC ACCESSORY CR5

## 2023-07-07 PROCEDURE — 250N000011 HC RX IP 250 OP 636: Performed by: INTERNAL MEDICINE

## 2023-07-07 PROCEDURE — 120N000001 HC R&B MED SURG/OB

## 2023-07-07 PROCEDURE — 250N000013 HC RX MED GY IP 250 OP 250 PS 637: Performed by: INTERNAL MEDICINE

## 2023-07-07 PROCEDURE — 250N000011 HC RX IP 250 OP 636: Performed by: NURSE PRACTITIONER

## 2023-07-07 PROCEDURE — 97530 THERAPEUTIC ACTIVITIES: CPT | Mod: GO

## 2023-07-07 PROCEDURE — 250N000013 HC RX MED GY IP 250 OP 250 PS 637: Performed by: PHYSICIAN ASSISTANT

## 2023-07-07 PROCEDURE — 250N000012 HC RX MED GY IP 250 OP 636 PS 637: Performed by: INTERNAL MEDICINE

## 2023-07-07 PROCEDURE — 99233 SBSQ HOSP IP/OBS HIGH 50: CPT | Performed by: INTERNAL MEDICINE

## 2023-07-07 PROCEDURE — C1788 PORT, INDWELLING, IMP: HCPCS

## 2023-07-07 PROCEDURE — 97535 SELF CARE MNGMENT TRAINING: CPT | Mod: GO

## 2023-07-07 PROCEDURE — 99233 SBSQ HOSP IP/OBS HIGH 50: CPT | Performed by: PHYSICIAN ASSISTANT

## 2023-07-07 PROCEDURE — 250N000011 HC RX IP 250 OP 636: Performed by: RADIOLOGY

## 2023-07-07 PROCEDURE — 85025 COMPLETE CBC W/AUTO DIFF WBC: CPT | Performed by: INTERNAL MEDICINE

## 2023-07-07 PROCEDURE — 258N000003 HC RX IP 258 OP 636: Performed by: INTERNAL MEDICINE

## 2023-07-07 PROCEDURE — 250N000009 HC RX 250: Performed by: NURSE PRACTITIONER

## 2023-07-07 PROCEDURE — C1769 GUIDE WIRE: HCPCS

## 2023-07-07 PROCEDURE — 36415 COLL VENOUS BLD VENIPUNCTURE: CPT | Performed by: INTERNAL MEDICINE

## 2023-07-07 PROCEDURE — 84100 ASSAY OF PHOSPHORUS: CPT | Performed by: INTERNAL MEDICINE

## 2023-07-07 PROCEDURE — 02HV33Z INSERTION OF INFUSION DEVICE INTO SUPERIOR VENA CAVA, PERCUTANEOUS APPROACH: ICD-10-PCS | Performed by: RADIOLOGY

## 2023-07-07 PROCEDURE — 99152 MOD SED SAME PHYS/QHP 5/>YRS: CPT

## 2023-07-07 PROCEDURE — 80053 COMPREHEN METABOLIC PANEL: CPT | Performed by: INTERNAL MEDICINE

## 2023-07-07 PROCEDURE — 0JH60WZ INSERTION OF TOTALLY IMPLANTABLE VASCULAR ACCESS DEVICE INTO CHEST SUBCUTANEOUS TISSUE AND FASCIA, OPEN APPROACH: ICD-10-PCS | Performed by: RADIOLOGY

## 2023-07-07 RX ORDER — ALLOPURINOL 300 MG/1
300 TABLET ORAL DAILY
Status: DISCONTINUED | OUTPATIENT
Start: 2023-07-07 | End: 2023-07-10 | Stop reason: HOSPADM

## 2023-07-07 RX ORDER — NALOXONE HYDROCHLORIDE 0.4 MG/ML
0.2 INJECTION, SOLUTION INTRAMUSCULAR; INTRAVENOUS; SUBCUTANEOUS
Status: DISCONTINUED | OUTPATIENT
Start: 2023-07-07 | End: 2023-07-07 | Stop reason: HOSPADM

## 2023-07-07 RX ORDER — DIPHENHYDRAMINE HYDROCHLORIDE 50 MG/ML
50 INJECTION INTRAMUSCULAR; INTRAVENOUS
Status: DISCONTINUED | OUTPATIENT
Start: 2023-07-07 | End: 2023-07-10 | Stop reason: HOSPADM

## 2023-07-07 RX ORDER — FLUMAZENIL 0.1 MG/ML
0.2 INJECTION, SOLUTION INTRAVENOUS
Status: DISCONTINUED | OUTPATIENT
Start: 2023-07-07 | End: 2023-07-07 | Stop reason: HOSPADM

## 2023-07-07 RX ORDER — ALBUTEROL SULFATE 90 UG/1
1-2 AEROSOL, METERED RESPIRATORY (INHALATION)
Status: DISCONTINUED | OUTPATIENT
Start: 2023-07-07 | End: 2023-07-10 | Stop reason: HOSPADM

## 2023-07-07 RX ORDER — PROCHLORPERAZINE MALEATE 5 MG
5-10 TABLET ORAL EVERY 6 HOURS PRN
Status: DISCONTINUED | OUTPATIENT
Start: 2023-07-07 | End: 2023-07-10 | Stop reason: HOSPADM

## 2023-07-07 RX ORDER — NALOXONE HYDROCHLORIDE 0.4 MG/ML
0.4 INJECTION, SOLUTION INTRAMUSCULAR; INTRAVENOUS; SUBCUTANEOUS
Status: DISCONTINUED | OUTPATIENT
Start: 2023-07-07 | End: 2023-07-07 | Stop reason: HOSPADM

## 2023-07-07 RX ORDER — LORAZEPAM 2 MG/ML
.5-1 INJECTION INTRAMUSCULAR EVERY 6 HOURS PRN
Status: DISCONTINUED | OUTPATIENT
Start: 2023-07-07 | End: 2023-07-10 | Stop reason: HOSPADM

## 2023-07-07 RX ORDER — HEPARIN SODIUM (PORCINE) LOCK FLUSH IV SOLN 100 UNIT/ML 100 UNIT/ML
500 SOLUTION INTRAVENOUS ONCE
Status: COMPLETED | OUTPATIENT
Start: 2023-07-07 | End: 2023-07-07

## 2023-07-07 RX ORDER — HEPARIN SODIUM,PORCINE 10 UNIT/ML
5-10 VIAL (ML) INTRAVENOUS EVERY 24 HOURS
Status: DISCONTINUED | OUTPATIENT
Start: 2023-07-07 | End: 2023-07-10 | Stop reason: HOSPADM

## 2023-07-07 RX ORDER — EPINEPHRINE 1 MG/ML
0.3 INJECTION, SOLUTION, CONCENTRATE INTRAVENOUS EVERY 5 MIN PRN
Status: DISCONTINUED | OUTPATIENT
Start: 2023-07-07 | End: 2023-07-10 | Stop reason: HOSPADM

## 2023-07-07 RX ORDER — MEPERIDINE HYDROCHLORIDE 25 MG/ML
25 INJECTION INTRAMUSCULAR; INTRAVENOUS; SUBCUTANEOUS EVERY 30 MIN PRN
Status: DISCONTINUED | OUTPATIENT
Start: 2023-07-07 | End: 2023-07-10 | Stop reason: HOSPADM

## 2023-07-07 RX ORDER — FENTANYL CITRATE 50 UG/ML
25-50 INJECTION, SOLUTION INTRAMUSCULAR; INTRAVENOUS EVERY 5 MIN PRN
Status: DISCONTINUED | OUTPATIENT
Start: 2023-07-07 | End: 2023-07-07 | Stop reason: HOSPADM

## 2023-07-07 RX ORDER — DOXORUBICIN HYDROCHLORIDE 2 MG/ML
50 INJECTION, SOLUTION INTRAVENOUS ONCE
Status: COMPLETED | OUTPATIENT
Start: 2023-07-07 | End: 2023-07-07

## 2023-07-07 RX ORDER — ONDANSETRON 8 MG/1
16 TABLET, FILM COATED ORAL ONCE
Status: COMPLETED | OUTPATIENT
Start: 2023-07-07 | End: 2023-07-07

## 2023-07-07 RX ORDER — ACETAMINOPHEN 325 MG/1
650 TABLET ORAL ONCE
Status: COMPLETED | OUTPATIENT
Start: 2023-07-07 | End: 2023-07-07

## 2023-07-07 RX ORDER — METHYLPREDNISOLONE SODIUM SUCCINATE 125 MG/2ML
125 INJECTION, POWDER, LYOPHILIZED, FOR SOLUTION INTRAMUSCULAR; INTRAVENOUS
Status: DISCONTINUED | OUTPATIENT
Start: 2023-07-07 | End: 2023-07-10 | Stop reason: HOSPADM

## 2023-07-07 RX ORDER — HEPARIN SODIUM (PORCINE) LOCK FLUSH IV SOLN 100 UNIT/ML 100 UNIT/ML
5-10 SOLUTION INTRAVENOUS
Status: DISCONTINUED | OUTPATIENT
Start: 2023-07-07 | End: 2023-07-10 | Stop reason: HOSPADM

## 2023-07-07 RX ORDER — HEPARIN SODIUM,PORCINE 10 UNIT/ML
5-10 VIAL (ML) INTRAVENOUS
Status: DISCONTINUED | OUTPATIENT
Start: 2023-07-07 | End: 2023-07-10 | Stop reason: HOSPADM

## 2023-07-07 RX ORDER — DIPHENHYDRAMINE HCL 50 MG
50 CAPSULE ORAL ONCE
Status: COMPLETED | OUTPATIENT
Start: 2023-07-07 | End: 2023-07-07

## 2023-07-07 RX ORDER — ALBUTEROL SULFATE 0.83 MG/ML
2.5 SOLUTION RESPIRATORY (INHALATION)
Status: DISCONTINUED | OUTPATIENT
Start: 2023-07-07 | End: 2023-07-10 | Stop reason: HOSPADM

## 2023-07-07 RX ORDER — LORAZEPAM 0.5 MG/1
.5-1 TABLET ORAL EVERY 6 HOURS PRN
Status: DISCONTINUED | OUTPATIENT
Start: 2023-07-07 | End: 2023-07-10 | Stop reason: HOSPADM

## 2023-07-07 RX ADMIN — PANTOPRAZOLE SODIUM 40 MG: 40 TABLET, DELAYED RELEASE ORAL at 16:18

## 2023-07-07 RX ADMIN — CYCLOPHOSPHAMIDE 1735 MG: 2 INJECTION, POWDER, FOR SOLUTION INTRAVENOUS; ORAL at 17:50

## 2023-07-07 RX ADMIN — ACETAMINOPHEN 1000 MG: 500 TABLET ORAL at 10:14

## 2023-07-07 RX ADMIN — SODIUM CHLORIDE 1 BAG: 9 INJECTION, SOLUTION INTRAVENOUS at 09:06

## 2023-07-07 RX ADMIN — FENTANYL CITRATE 50 MCG: 50 INJECTION, SOLUTION INTRAMUSCULAR; INTRAVENOUS at 09:17

## 2023-07-07 RX ADMIN — FOSAPREPITANT 150 MG: 150 INJECTION, POWDER, LYOPHILIZED, FOR SOLUTION INTRAVENOUS at 16:19

## 2023-07-07 RX ADMIN — FENTANYL CITRATE 50 MCG: 50 INJECTION, SOLUTION INTRAMUSCULAR; INTRAVENOUS at 09:22

## 2023-07-07 RX ADMIN — CEFAZOLIN SODIUM 2 G: 2 INJECTION, SOLUTION INTRAVENOUS at 08:44

## 2023-07-07 RX ADMIN — MIDAZOLAM 1 MG: 1 INJECTION INTRAMUSCULAR; INTRAVENOUS at 09:16

## 2023-07-07 RX ADMIN — ACETAMINOPHEN 650 MG: 325 TABLET, FILM COATED ORAL at 11:35

## 2023-07-07 RX ADMIN — MIDAZOLAM 1 MG: 1 INJECTION INTRAMUSCULAR; INTRAVENOUS at 09:04

## 2023-07-07 RX ADMIN — ALLOPURINOL 300 MG: 300 TABLET ORAL at 11:35

## 2023-07-07 RX ADMIN — ACETAMINOPHEN 1000 MG: 500 TABLET ORAL at 16:18

## 2023-07-07 RX ADMIN — LIDOCAINE HYDROCHLORIDE 20 ML: 10 INJECTION, SOLUTION INFILTRATION; PERINEURAL at 09:26

## 2023-07-07 RX ADMIN — ACETAMINOPHEN 1000 MG: 500 TABLET ORAL at 22:12

## 2023-07-07 RX ADMIN — DOXORUBICIN HYDROCHLORIDE 120 MG: 2 INJECTION, SOLUTION INTRAVENOUS at 17:03

## 2023-07-07 RX ADMIN — VALSARTAN 80 MG: 80 TABLET, FILM COATED ORAL at 10:14

## 2023-07-07 RX ADMIN — DIPHENHYDRAMINE HYDROCHLORIDE 50 MG: 50 CAPSULE ORAL at 11:36

## 2023-07-07 RX ADMIN — PREDNISONE 100 MG: 50 TABLET ORAL at 10:13

## 2023-07-07 RX ADMIN — MIDAZOLAM 1 MG: 1 INJECTION INTRAMUSCULAR; INTRAVENOUS at 09:20

## 2023-07-07 RX ADMIN — TAMSULOSIN HYDROCHLORIDE 0.4 MG: 0.4 CAPSULE ORAL at 10:14

## 2023-07-07 RX ADMIN — ONDANSETRON HYDROCHLORIDE 16 MG: 8 TABLET, FILM COATED ORAL at 16:18

## 2023-07-07 RX ADMIN — ENOXAPARIN SODIUM 40 MG: 40 INJECTION SUBCUTANEOUS at 10:14

## 2023-07-07 RX ADMIN — HEPARIN SODIUM (PORCINE) LOCK FLUSH IV SOLN 100 UNIT/ML 500 UNITS: 100 SOLUTION at 09:37

## 2023-07-07 RX ADMIN — Medication 5 ML: at 18:48

## 2023-07-07 RX ADMIN — ATORVASTATIN CALCIUM 10 MG: 10 TABLET, FILM COATED ORAL at 10:14

## 2023-07-07 RX ADMIN — PANTOPRAZOLE SODIUM 40 MG: 40 TABLET, DELAYED RELEASE ORAL at 07:03

## 2023-07-07 RX ADMIN — RITUXIMAB-ABBS 900 MG: 10 INJECTION, SOLUTION INTRAVENOUS at 12:29

## 2023-07-07 RX ADMIN — VINCRISTINE SULFATE 2 MG: 1 INJECTION, SOLUTION INTRAVENOUS at 17:24

## 2023-07-07 ASSESSMENT — ACTIVITIES OF DAILY LIVING (ADL)
ADLS_ACUITY_SCORE: 35
ADLS_ACUITY_SCORE: 43
ADLS_ACUITY_SCORE: 38
ADLS_ACUITY_SCORE: 35
ADLS_ACUITY_SCORE: 38
ADLS_ACUITY_SCORE: 35
ADLS_ACUITY_SCORE: 31
ADLS_ACUITY_SCORE: 31
ADLS_ACUITY_SCORE: 35
ADLS_ACUITY_SCORE: 43

## 2023-07-07 NOTE — PLAN OF CARE
Shift note: [id number], 7/6/23, 7/7/23    Summary:  HX of congential [place], HTN, HPL  Primary Diagnosis: New mets found involving the stomach and thoracic spine.   Orientation: A&Ox4. Deaf. Limited hearing in L ear - hearing aides in place   Vital signs: VSS on RA.   Aggression Stop Light: Green  Mobility: A2 with sarasteady  Pain Management: Denies pain, on scheduled Tylenol   Diet: NPO  for port placement, and has hemo 7/7/23  Bowel/Bladder: Downing, adequate output. Patient occasionally incontinent of bowel.    Abnormal Lab/Assessments: BLE numbness and tingling, but improving per pt.   Drain/Device/Wound: PIV SL, Downing  Other: Final radiation done on 7/6/23  Consults: Hem Onc, Radiation Oncology, PT, OT , Social work following for discharge needs.     Discharge Plan: TCU pending placement.

## 2023-07-07 NOTE — PRE-PROCEDURE
GENERAL PRE-PROCEDURE:   Procedure:  Port a catheter placement with moderate sedation  Date/Time:  7/7/2023 8:40 AM    Written consent obtained?: Yes    Risks and benefits: Risks, benefits and alternatives were discussed    Consent given by:  Patient  Patient states understanding of procedure being performed: Yes    Patient's understanding of procedure matches consent: Yes    Procedure consent matches procedure scheduled: Yes    Expected level of sedation:  Moderate  Appropriately NPO:  Yes  ASA Class:  3  Mallampati  :  Grade 2- soft palate, base of uvula, tonsillar pillars, and portion of posterior pharyngeal wall visible  Lungs:  Lungs clear with good breath sounds bilaterally  Heart:  Normal heart sounds and rate  History & Physical reviewed:  History and physical reviewed and no updates needed  Statement of review:  I have reviewed the lab findings, diagnostic data, medications, and the plan for sedation    Total time: 10 minutes    Thanks [facility] CNP ([phone]) (phone [phone])

## 2023-07-07 NOTE — TELEPHONE ENCOUNTER
Writer called [facility] [phone] and LVM.    Writer received a return call from [facility] and nurse was able to view records and requested a referral to be placed.    Referral was faxed to [phone].    Iman Haines RN

## 2023-07-07 NOTE — PROGRESS NOTES
BRIEF NUTRITION REASSESSMENT      CURRENT DIET AND INTAKE:  Diet:  Mechanical Soft           Room Service with Assist              Chart reviewed  7/5: Onc - path revealed diffuse, large B-cell lymphoma (Stage IV), gastric involvment and spine - We discussed that this is a curable cancer with obviously worse prognosis if patients have spine involvement or double hit, but he still remains curable   7/6: s/p xrt to the spine completed today, last dose    Pt has been ordering full meals  Orders at least 1-2 Ensure supplements per day  Flowsheets reflect meal intake has been ~100%    Port placed  Plan to begin chemo today      ANTHROPOMETRICS:  07/07/23 0700 105.9 kg (233 lb 7.5 oz) Bed scale   07/05/23 2354 106.5 kg (234 lb 12.6 oz) --   07/04/23 0652 105.4 kg (232 lb 5.8 oz) Bed scale   06/28/23 1659 102.7 kg (226 lb 6.6 oz) Bed scale   06/27/23 1845 104.3 kg (230 lb)          LABS:  Labs noted    MALNUTRITION:  Patient does not meet two of the following criteria necessary for diagnosing malnutrition.     % Weight Loss:  Up to 10% in 6 months (moderate malnutrition) - 9% in the past 6 months PTA  % Intake:  Decreased intake does not meet criteria for malnutrition  - pt has had good po intake since admit (10 days) - improved since PTA  Subcutaneous Fat Loss:  None observed  Muscle Loss:  None observed  Fluid Retention:  None noted    NUTRITION INTERVENTION:  Nutrition Diagnosis:  No nutrition diagnosis at this time.    Implementation:  Continue to provide daily assistance ordering meals  Nutrition supplements prn (pt enjoys the Ensure)    FOLLOW UP/MONITORING:   Will re-evaluate in 7 - 10 days, or sooner, if re-consulted.

## 2023-07-07 NOTE — PROGRESS NOTES
Hematology-Oncology Follow-up Note  [facility]     Today's Date: 07/07/23  Date of Admission:  6/27/2023  Reason for Consult: new malignancy    ASSESSMENT/PLAN:  Enrique Dumas is a 67 year old male with new diagnosis of DLBCL with spinal and stomach involvement.    # Diffuse Large B-cell Lymphoma  - Initiate cycle 1 R-CHOP  - Prednisone through day 5   - Continue allopurinol through day 10 for tumor lysis prophylaxis  - Check TLS labs in AM  - Will require growth factor support due to age and possibly bone marrow involvement   - If outpatient, can give Neulasta injection 18-72H following chemo; if inpatient or at facility will need daily Neupogen due to insurance coverage  - Future cycles to be given at local clinic, appointment to establish care is pending  - Overall plan for PET-CT after 2 cycles followed and completion of 6 cycles pending response   - Has not had bone marrow biopsy or PET staging completed  - We reviewed antiemetics, warning symptoms such as fevers, prednisone, and pain management    INTERIM HISTORY:  Doing good. Feels back is generally improved. GI symptoms stable. Agreeable for treatment. Reviewed with wife and son as well.     HISTORY OF PRESENTING ILLNESS:  Developed back pain January 2023. Developed acute LE swellilng June 2023 and presented to ED 6/27. US negative for DVT.     CT C/A/P with proximal gastric wall thickening concerning for infiltrative or neoplastic process; sclerotic changes in the thoracic vertebra; indeterminate 9 mm right lung nodule; hypodense hepatic lesion and also splenic lesion.     MRI Spine with diffuse infiltrative process of bone marrow of the thoracic spine; pathological compression fracture of T10 vertebral body; extraosseous extension with neural foraminal narrowing from T8-T11; No abnormal signal in thoracic spinal cord.    EGD/EUS 6/28 with localized prominent gastric fold with infiltrative mass with hemorrhage; Mass in stomach  "fundus highly suspicious for malignancy, with malignant lymph nodes [id number]. Stomach biopsy confirmed DLBCL, GCB-type, H. Pylori negative, additional studies pending. CNS-IPI 3.     He underwent radiation to T-spine x 5 session 6/29-7/3.     Port placed 7/7. ECHO WNL, Hep b serologies negative.     Cycle 1 R-CHOP started 7/7.     Hospital course has been complicated by urinary retention requiring Downing catheter; follows with [facility] locally.     Medical History:  Congenital deaf/hard of hearing  HTN  Hyperlipidemia    MEDICATIONS:  Reviewed     PHYSICAL EXAM:  Vital signs:  Temp: 97.8  F (36.6  C) Temp src: Oral BP: 123/79 Pulse: 95   Resp: 18 SpO2: 96 % O2 Device: None (Room air)   Height: 180.3 cm (5' 11\") Weight: 105.9 kg (233 lb 7.5 oz)  Estimated body mass index is 32.56 kg/m  as calculated from the following:    Height as of this encounter: 1.803 m (5' 11\").    Weight as of this encounter: 105.9 kg (233 lb 7.5 oz).    General: Pleasant patient in no acute distress. Sitting in bed with ease. Receiving initial chemotherapy during exam, therefore exam limited.  Skin: Warm and dry. No abnormal ecchymosis or rashes. Port clean, dry, intact.  Eyes: Anicteric.  ENT: Reads lips/extremely hard of hearing.   Lungs:  Normal work of breathing.  Abdomen: Non-distended.  Extremities: No peripheral edema. Gross movement intact without difficulty.  Mental: Calm, cooperative, appropriate. Mood euthymic. Positive attitude.  Neuro:  Alert and oriented x 3.    LABS:  Recent Labs   Lab Test 07/07/23  0717      POTASSIUM 4.5   CHLORIDE 103   CO2 24   ANIONGAP 11   BUN 31.0*   CR 0.68   *   ESTEFANY 9.2     Recent Labs   Lab Test 07/07/23  0717 07/04/23  0620 06/29/23  0727   WBC 8.7 9.4 6.8   HGB 13.2* 14.1 15.3    227 261   MCV 88 87 87   NEUTROPHIL 84  --  86     Recent Labs   Lab Test 07/07/23  0717 07/06/23  1120   BILITOTAL 0.3 0.3   ALKPHOS 61 66   ALT 30 25   AST 16 18   ALBUMIN 3.5 3.6   LDH  --  209 "       Yoli An PA-C  [facility]   [phone]

## 2023-07-07 NOTE — TELEPHONE ENCOUNTER
----- Message from Nick Green MD sent at 7/6/2023 12:07 PM CDT -----  Hi Patient in the hospital we will transfer care to [place].  We need to fax a referral to [phone] at [facility] in [place] per their request to get the patient in    He is starting R-CHOP today actually prednisone today he will do the top part tomorrow after he gets the port in.  Depending on his disposition we may have to set him up for outpatient Neulasta where he can just come to the clinic and get it after 24 hours if he is discharged.  The regimen is in with the Neulasta so I just want to see if we can get that approved in the outpatient setting.  Let me know if you have any questions thanks    If there are any questions or would be easier for you to call me rather than sending you messages because I am running behind my cell is [phone]

## 2023-07-07 NOTE — IR NOTE
Interventional Radiology Intra-procedural Nursing Note    Patient Name: Enrique Dumas  Medical Record Number: [id number]  Today's Date: July 7, 2023    Procedure: Port a catheter placement with moderate sedation    Start time: 0916  End time: 0940  Report provided to: Sandra Mathew RN      Note: Patient entered Interventional Radiology Suite number 2 via cart. Patient awake, alert and oriented. Assisted onto procedural table in Supine position. Prepped and draped.  Dr. Henderson in room. Time out and procedure started. Vital signs stable. Telemetry reading NSR.    Procedure well tolerated by patient without complications. Procedure end with debrief by Dr. Henderson.  Internal Sutures and Dermabond.      Rt Port Left accessed  2G Ancef IV pre op      Administered medication totals:  Lidocaine 1% 20 mL Intradermal      Versed 3 mg IVP  Fentanyl 100 mcg IVP    Last dose of sedation administered at 0920.

## 2023-07-07 NOTE — PROGRESS NOTES
[facility]    Hospitalist Progress Note    Assessment & Plan   Enrique Dumas is a 67 year old male with history including HTN; HLD; and congenital Sycuan; who presented 6/27/2023 with right leg swelling with recent back pain. 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      On initially evaluation, pt was afebrile, hemodynamically stable, not hypoxic. ECG showed SR with NSTWA inferior limb leads, no acute ischemic changes. Labs notable for CBC normal; BMP with sodium 135, potassium 2.7; LFT's and lipase normal; trop negative; d-dimer 0.69; UA negative for signs of infection.     Right lower extremity US 6/28 negative for DVT.      CT CAP 6/28 showed no evidence for pulmonary emboli; proximal gastric wall thickening concerning for an infiltrative or neoplastic process which included a soft tissue mass along the posterior margin of the proximal stomach confluent with the adjacent spleen and pancreatic tail; sclerotic changes involving the T10 -T12 vertebral bodies with destructive changes of T12 and abnormal surrounding paravertebral soft tissue extending into the posterior mediastinum, findings also concerning for a neoplastic process; indeterminate 9 mm pulmonary nodule right lung apex could represent a metastatic lesion; hypodense right hepatic lobe lesion concerning for metastasis. Splenic metastasis also possible.     MRI C-spine and L-spine 6/27 negative for acute findings. MRI T-spine 6/27 showed diffuse infiltrative process of the bone marrow of the thoracic spine both anteriorly and posteriorly with extraosseous extension a diffuse involvement of the neural foramen consistent with diffuse metastatic disease; extraosseous extension leads canal compromise and neural foraminal narrowing from the T8 to the T10-T11 disc space level; no abnormal signal thoracic spinal cord; prominent involvement of the posterior ribs in the thoracic region  with extension into the soft tissues.     1.  Large, B-cell lymphoma  RLE pain and swelling, suspect related to above.  Back pain and abdominal pain related to above.     * Initial presentation and imaging as above. Pt presented with right lower extremity edema but had been having back pain since the beginning of the year; also had c/o abdominal pain. GI and Oncology consulted on admit. Started on IV pantoprazole on admit.  * On 6/28, pt c/o worsened right thigh pain and swelling. CT thigh did not show any acute findings. Underwent EGD that showed normal esophagus; enlarged gastric folds with hemorrhage, biopsied; erosive gastropathy with no bleeding and no stigmata of recent bleeding; normal duodenal bulb, first portion of the duodenum and second portion of the duodenum. 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 Seen by Oncology and was started on dexamethasone.   * On 6/29, still with back pain, but thigh pain and swelling improved. Seen by Radiation Oncology and started on palliative spine radiation. Started scheduled acetaminophen and lidocaine patch.  * On 6/30, symptoms improved.     - 7/5/23 -Dr. Lord confirmed that path revealed diffuse, large B-cell lymphoma (Stage IV) and spoke with pt and family, patient was seen by Dr. Green on 7/6 and decision was made to start  the first course of R-CHOP,.  Prednisone was initiated on 7/6, patient will complete the infusion today, will need to decide on Neupogen dosage following that, once we have that information patient can transfer to his swing bed in [facility] he is palliative radiation treatment to the spine on 7/5..     - Continue dexamethasone 4 mg q12h.  - Continue acetaminophen 1000 mg TID and lidocaine patch.  - Continue PRN oxycodone 5-10 mg q4h. PRN cyclobenzaprine and PRN IV hydromorphone; minimize opioids as able.  - Continue to keep right lower extremity elevated.     2. Erosive gastropathy on EGD.  * Initial presentation as above. Started on pantoprazole on admit.  * EGD 6/28 as above.  - Continue pantoprazole .     3. Urinary retention, question from immobility, possible BPH.  Hematuria, suspect related to catheter trauma.  * On 6/28, pt noted with urinary retention requiring multiple SIC.  * On 6/29, started on tamsulosin. Continued to have retention with pain/discomfort with multiple straight catheterizations and Downing placed. Subsequently had bloody urine noted. Urology consulted.  - Continue Downing for 10-14d (Urology advised TOV no sooner than 7/5).  - Continue PRN irrigation.  - Continue tamsulosin.  - Follow-up with [facility] outpatient, appreciate help.  Voiding trial ordered on 7/5/2023, patient failed voiding trials, seen by [place] [facility] again, plan is to leave Downing in and follow-up outpatient.        4. Constipation;  Patient is receiving MiraLAX daily but was unable to have bowel movement  Needing as needed Dulcolax suppository     5. Indeterminate pulmonary nodule.  * Initial presentation as above. CT also showed an indeterminate 9 mm pulmonary nodule right lung apex, could represent a metastatic lesion.  - PET CT outpatient as above.     6. Weakness and physical deconditioning due to multiple acute medical issues.  * Pt with significant weakness with difficulty walking due to  "above issues.  * Pt had an assisted fall and PT and OT consulted 6/28.  - Continue PT and OT.  - Continue to treat other issues as noted.  Patient has a swing bed in [place] close to his home, can discharge there once oncology decides on Neupogen dosage and timing.  This can be as early as 7/8, this was discussed with patient and family.       7. Hypertension (benign essential).  PTA: valsartan-HCTZ 320-25 mg daily.  * Valsartan-HCTZ held on admit due to hypokalemia.  - Continue to hold valsartan-HCTZ.  Elevated blood pressures smaller dose of valsartan was started, possibly can restart full dose on discharge and decide on holding hydrochlorothiazide going forward.     8. Hyponatremia, suspect nutritional/hypovolemia.  * Sodium 135 on admit. Pt started on LR on admit.  Hold     9. Hypokalemia.  Supplemented and resolved     10. Hyperlipidemia/dyslipidemia.  - Continue atorvastatin.         DVT Prophylaxis: Lovenox  Code Status: Full Code     52 MINUTES SPENT BY ME on the date of service doing chart review, history, exam, documentation & further activities per the note.  Disposition: Expected discharge 7/8  Clinically Significant Risk Factors                         # Obesity: Estimated body mass index is 32.56 kg/m  as calculated from the following:    Height as of this encounter: 1.803 m (5' 11\").    Weight as of this encounter: 105.9 kg (233 lb 7.5 oz).           Sarah Cordoba MD  Text Page   (7am to 6pm)    Interval History   Patient is resting, had port placed, plan is to start on R-CHOP today, possibly tomorrow he could be ready to be transferred to a swing bed in [place], [place].    -Data reviewed today: I reviewed all new labs and imaging results over the last 24 hours.  Physical Exam     Vital Signs with Ranges  Temp:  [97.3  F (36.3  C)-98.2  F (36.8  C)] 97.7  F (36.5  C)  Pulse:  [] 118  Resp:  [12-20] 18  BP: (117-149)/(70-91) 139/88  SpO2:  [93 %-97 %] 96 %  I/O last 3 completed " shifts:  In: -   Out: 3500 [Urine:3500]    Constitutional: Awake, alert, cooperative  Respiratory: Bilateral basilar crackles present  Cardiovascular: Regular rate and rhythm, normal S1 and S2, and no murmur noted  GI: Normal bowel sounds, soft, non-distended, non-tender  Skin/Integumen: No rashes, no cyanosis, no edema  Neuro : moving all 4 extremities, no focal deficit noted     Medications       acetaminophen  1,000 mg Oral TID     allopurinol  300 mg Oral Daily     atorvastatin  10 mg Oral Daily     cyclophosphamide  750 mg/m2 (Treatment Plan Recorded) Intravenous Once     [START ON 7/8/2023] dextrose 5% water  10-20 mL Intracatheter Daily at 8 pm     [START ON 7/8/2023] dextrose 5% water  10-20 mL Intracatheter Daily at 8 pm     DOXOrubicin  50 mg/m2 (Treatment Plan Recorded) Intravenous Once     enoxaparin ANTICOAGULANT  40 mg Subcutaneous Q24H     [START ON 7/8/2023] filgrastim-aafi  5 mcg/kg (Treatment Plan Recorded) Intravenous Daily at 8 pm     fosaprepitant (EMEND) 150 mg in sodium chloride 0.9 % 275 mL intermittent infusion  150 mg Intravenous Once     heparin  5-10 mL Intracatheter Q28 Days     heparin lock flush  5-10 mL Intracatheter Q24H     lidocaine-EPINEPHrine  1-20 mL Intradermal Once     ondansetron  16 mg Oral Once     pantoprazole  40 mg Oral BID AC     polyethylene glycol  17 g Oral Daily     predniSONE  100 mg Oral Daily     senna-docusate  1 tablet Oral At Bedtime     sodium chloride (PF)  10-20 mL Intracatheter Q28 Days     sodium chloride (PF)  3 mL Intracatheter Q8H     tamsulosin  0.4 mg Oral Daily     valsartan  80 mg Oral Daily     vinCRIStine (ONCOVIN) 2 mg in sodium chloride 0.9 % 29.5 mL infusion  2 mg Intravenous Once       Data   Recent Labs   Lab 07/07/23  0717 07/06/23  1120 07/06/23  0702 07/05/23  0650 07/04/23  0620   WBC 8.7  --   --   --  9.4   HGB 13.2*  --   --   --  14.1   MCV 88  --   --   --  87     --   --   --  227    138  --   --  135*   POTASSIUM 4.5  4.8 5.0   < > 4.4   CHLORIDE 103 104  --   --  100   CO2 24 21*  --   --  25   BUN 31.0* 30.4*  --   --  28.8*   CR 0.68 0.72  --   --  0.69   ANIONGAP 11 13  --   --  10   ESTEFANY 9.2 9.4  --   --  9.3   * 100*  --   --  129*   ALBUMIN 3.5 3.6  --   --   --    PROTTOTAL 6.1* 6.3*  --   --   --    BILITOTAL 0.3 0.3  --   --   --    ALKPHOS 61 66  --   --   --    ALT 30 25  --   --   --    AST 16 18  --   --   --     < > = values in this interval not displayed.     Recent Labs   Lab 07/07/23  0717 07/06/23  1120 07/04/23  0620   * 100* 129*       Imaging:   No results found for this or any previous visit (from the past 24 hour(s)).

## 2023-07-07 NOTE — PLAN OF CARE
Pt is A&Ox4. VSS on RA. [place]. Denies pain. Downing patent with good UOP. X1 BM this shift. Port placed today, good BR noted. Received chemotherapy today, tolerated infusions well. Trace edema to BLE. Up A2 with binu-steady. Up to chair for meals. On a mechanical dental soft diet, good appetite. Discharge to TCU pending.

## 2023-07-07 NOTE — PROGRESS NOTES
Care Management Follow Up    Length of Stay (days): 10    Expected Discharge Date: 07/08/2023     Concerns to be Addressed:       Patient plan of care discussed at interdisciplinary rounds: Yes    Anticipated Discharge Disposition: Skilled Nursing Facility, Other (Comments) (swing bed)     Anticipated Discharge Services: None (swing bed)  Anticipated Discharge DME: None    Patient/family educated on Medicare website which has current facility and service quality ratings: yes  Education Provided on the Discharge Plan: Yes  Patient/Family in Agreement with the Plan: yes    Referrals Placed by CM/SW:  (Transitional care)  Private pay costs discussed: Not applicable    Additional Information:  SW received a call from the  Jesica at [facility] ([phone]) who confirmed that the patient could come today to the swing bed, but she heard that the patient would not be ready today after his chemo treatment. They are likely able to accept tomorrow, but would need to confirm tomorrow after they know their census. The contact tomorrow is the nurses station, [phone]. Oncology must determine if the swing bed is a feasible option for the patient.     Gunjan Staples, JUAN, LGSW   Social Work   [facility]

## 2023-07-07 NOTE — PLAN OF CARE
A&Ox4. Deaf L ear, [place] R ear, reads lips well. Tolerating mech soft diet. BM x1- continent. Downing placed for retention. Urology re consulted. Particular with cares. New IV placed. Denies pain. VSS RA. NPO midnight for port placement and chemo tomorrow.

## 2023-07-07 NOTE — IR NOTE
RADIOLOGY POST PROCEDURE NOTE    Patient name: Enrique Dumas  MRN: [id number]  : 1955    Pre-procedure diagnosis: Malignancy  Post-procedure diagnosis: Same    Procedure Date/Time: 2023  12:18 PM  Procedure: Right Int Jug power port and catheter placement.  Tip at the RA/SVC junction.  Heparinized and ready for use.  No apparent complication.  Estimated blood loss: 10 ml  Specimen(s) collected with description: none    The patient tolerated the procedure well with no immediate complications.  Significant findings:  Please see above.    See imaging dictation for procedural details.    Provider name: Itz Henderson MD  Assistant(s):None

## 2023-07-07 NOTE — PROGRESS NOTES
Urology     Consult received again today regarding failed TOV    Wick replaced and functioning well per chart review     Discharge with wick and further workup with TOV and cystoscopy as outpatient. [facility] information added to AVS as option, though patient expresses interest in established urologic care with [facility].     Adwoa Kelsey PA-C   [facility]  Office Phone: [phone]  After 4pm and on weekends, please call [phone].

## 2023-07-08 LAB
ALBUMIN SERPL BCG-MCNC: 3.4 G/DL (ref 3.5–5.2)
ALP SERPL-CCNC: 62 U/L (ref 40–129)
ALT SERPL W P-5'-P-CCNC: 77 U/L (ref 0–70)
ANION GAP SERPL CALCULATED.3IONS-SCNC: 12 MMOL/L (ref 7–15)
AST SERPL W P-5'-P-CCNC: 35 U/L (ref 0–45)
BASOPHILS # BLD AUTO: 0 10E3/UL (ref 0–0.2)
BASOPHILS NFR BLD AUTO: 0 %
BILIRUB SERPL-MCNC: 0.3 MG/DL
BUN SERPL-MCNC: 32.5 MG/DL (ref 8–23)
CALCIUM SERPL-MCNC: 8.8 MG/DL (ref 8.8–10.2)
CHLORIDE SERPL-SCNC: 103 MMOL/L (ref 98–107)
CREAT SERPL-MCNC: 0.65 MG/DL (ref 0.67–1.17)
DEPRECATED HCO3 PLAS-SCNC: 21 MMOL/L (ref 22–29)
EOSINOPHIL # BLD AUTO: 0 10E3/UL (ref 0–0.7)
EOSINOPHIL NFR BLD AUTO: 0 %
ERYTHROCYTE [DISTWIDTH] IN BLOOD BY AUTOMATED COUNT: 15.3 % (ref 10–15)
GFR SERPL CREATININE-BSD FRML MDRD: >90 ML/MIN/1.73M2
GLUCOSE SERPL-MCNC: 132 MG/DL (ref 70–99)
HCT VFR BLD AUTO: 41.9 % (ref 40–53)
HGB BLD-MCNC: 13.9 G/DL (ref 13.3–17.7)
IMM GRANULOCYTES # BLD: 0.1 10E3/UL
IMM GRANULOCYTES NFR BLD: 1 %
LYMPHOCYTES # BLD AUTO: 0.4 10E3/UL (ref 0.8–5.3)
LYMPHOCYTES NFR BLD AUTO: 5 %
MCH RBC QN AUTO: 29.6 PG (ref 26.5–33)
MCHC RBC AUTO-ENTMCNC: 33.2 G/DL (ref 31.5–36.5)
MCV RBC AUTO: 89 FL (ref 78–100)
MONOCYTES # BLD AUTO: 0.6 10E3/UL (ref 0–1.3)
MONOCYTES NFR BLD AUTO: 8 %
NEUTROPHILS # BLD AUTO: 6.2 10E3/UL (ref 1.6–8.3)
NEUTROPHILS NFR BLD AUTO: 86 %
NRBC # BLD AUTO: 0 10E3/UL
NRBC BLD AUTO-RTO: 0 /100
PHOSPHATE SERPL-MCNC: 3.4 MG/DL (ref 2.5–4.5)
PLATELET # BLD AUTO: 191 10E3/UL (ref 150–450)
POTASSIUM SERPL-SCNC: 4 MMOL/L (ref 3.4–5.3)
PROT SERPL-MCNC: 5.9 G/DL (ref 6.4–8.3)
RBC # BLD AUTO: 4.69 10E6/UL (ref 4.4–5.9)
SODIUM SERPL-SCNC: 136 MMOL/L (ref 136–145)
URATE SERPL-MCNC: 2.9 MG/DL (ref 3.4–7)
WBC # BLD AUTO: 7.3 10E3/UL (ref 4–11)

## 2023-07-08 PROCEDURE — 250N000013 HC RX MED GY IP 250 OP 250 PS 637: Performed by: INTERNAL MEDICINE

## 2023-07-08 PROCEDURE — 99232 SBSQ HOSP IP/OBS MODERATE 35: CPT | Performed by: INTERNAL MEDICINE

## 2023-07-08 PROCEDURE — 250N000011 HC RX IP 250 OP 636: Performed by: INTERNAL MEDICINE

## 2023-07-08 PROCEDURE — 250N000013 HC RX MED GY IP 250 OP 250 PS 637: Performed by: HOSPITALIST

## 2023-07-08 PROCEDURE — 120N000001 HC R&B MED SURG/OB

## 2023-07-08 PROCEDURE — 84100 ASSAY OF PHOSPHORUS: CPT | Performed by: PHYSICIAN ASSISTANT

## 2023-07-08 PROCEDURE — 250N000011 HC RX IP 250 OP 636: Mod: JZ | Performed by: INTERNAL MEDICINE

## 2023-07-08 PROCEDURE — 80053 COMPREHEN METABOLIC PANEL: CPT | Performed by: PHYSICIAN ASSISTANT

## 2023-07-08 PROCEDURE — 250N000012 HC RX MED GY IP 250 OP 636 PS 637: Performed by: INTERNAL MEDICINE

## 2023-07-08 PROCEDURE — 85025 COMPLETE CBC W/AUTO DIFF WBC: CPT | Performed by: PHYSICIAN ASSISTANT

## 2023-07-08 PROCEDURE — 258N000003 HC RX IP 258 OP 636: Performed by: INTERNAL MEDICINE

## 2023-07-08 PROCEDURE — 250N000013 HC RX MED GY IP 250 OP 250 PS 637: Performed by: PHYSICIAN ASSISTANT

## 2023-07-08 PROCEDURE — 84550 ASSAY OF BLOOD/URIC ACID: CPT | Performed by: PHYSICIAN ASSISTANT

## 2023-07-08 RX ADMIN — OXYCODONE HYDROCHLORIDE 5 MG: 5 TABLET ORAL at 06:18

## 2023-07-08 RX ADMIN — VALSARTAN 80 MG: 80 TABLET, FILM COATED ORAL at 08:54

## 2023-07-08 RX ADMIN — ACETAMINOPHEN 1000 MG: 500 TABLET ORAL at 08:56

## 2023-07-08 RX ADMIN — SENNOSIDES AND DOCUSATE SODIUM 1 TABLET: 50; 8.6 TABLET ORAL at 22:03

## 2023-07-08 RX ADMIN — PREDNISONE 100 MG: 50 TABLET ORAL at 08:54

## 2023-07-08 RX ADMIN — ALLOPURINOL 300 MG: 300 TABLET ORAL at 08:54

## 2023-07-08 RX ADMIN — Medication 5 ML: at 06:28

## 2023-07-08 RX ADMIN — Medication 1 LOZENGE: at 02:42

## 2023-07-08 RX ADMIN — ATORVASTATIN CALCIUM 10 MG: 10 TABLET, FILM COATED ORAL at 08:54

## 2023-07-08 RX ADMIN — ACETAMINOPHEN 1000 MG: 500 TABLET ORAL at 22:03

## 2023-07-08 RX ADMIN — PANTOPRAZOLE SODIUM 40 MG: 40 TABLET, DELAYED RELEASE ORAL at 16:32

## 2023-07-08 RX ADMIN — ACETAMINOPHEN 1000 MG: 500 TABLET ORAL at 16:32

## 2023-07-08 RX ADMIN — Medication 5 ML: at 20:50

## 2023-07-08 RX ADMIN — TAMSULOSIN HYDROCHLORIDE 0.4 MG: 0.4 CAPSULE ORAL at 08:54

## 2023-07-08 RX ADMIN — DEXTROSE MONOHYDRATE 480 MCG: 50 INJECTION, SOLUTION INTRAVENOUS at 20:29

## 2023-07-08 RX ADMIN — PANTOPRAZOLE SODIUM 40 MG: 40 TABLET, DELAYED RELEASE ORAL at 08:54

## 2023-07-08 RX ADMIN — ENOXAPARIN SODIUM 40 MG: 40 INJECTION SUBCUTANEOUS at 11:02

## 2023-07-08 ASSESSMENT — ACTIVITIES OF DAILY LIVING (ADL)
ADLS_ACUITY_SCORE: 32
ADLS_ACUITY_SCORE: 31
ADLS_ACUITY_SCORE: 32
ADLS_ACUITY_SCORE: 32
ADLS_ACUITY_SCORE: 31
ADLS_ACUITY_SCORE: 32
ADLS_ACUITY_SCORE: 31
ADLS_ACUITY_SCORE: 31

## 2023-07-08 NOTE — PLAN OF CARE
A&Ox4; very [place].  VSS; RA.  Assist x 2 with GB and sarasteady.  Complained of mild back pain this AM; PRN oxycodone given.  Downing in-place with good output; one BM last night.  Port HL; PIV SL.  Tolerating mechanical/dental soft diet without problems.  Maintained chemo precautions.  Discharge to TCU pending progress.

## 2023-07-08 NOTE — PROGRESS NOTES
[facility]    Hospitalist Progress Note    Assessment & Plan   Enrique Dumas is a 67 year old male with history including HTN; HLD; and congenital [place]; who presented 6/27/2023 with right leg swelling with recent back pain. 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      On initially evaluation, pt was afebrile, hemodynamically stable, not hypoxic. ECG showed SR with NSTWA inferior limb leads, no acute ischemic changes. Labs notable for CBC normal; BMP with sodium 135, potassium 2.7; LFT's and lipase normal; trop negative; d-dimer 0.69; UA negative for signs of infection.     Right lower extremity US 6/28 negative for DVT.      CT CAP 6/28 showed no evidence for pulmonary emboli; proximal gastric wall thickening concerning for an infiltrative or neoplastic process which included a soft tissue mass along the posterior margin of the proximal stomach confluent with the adjacent spleen and pancreatic tail; sclerotic changes involving the T10 -T12 vertebral bodies with destructive changes of T12 and abnormal surrounding paravertebral soft tissue extending into the posterior mediastinum, findings also concerning for a neoplastic process; indeterminate 9 mm pulmonary nodule right lung apex could represent a metastatic lesion; hypodense right hepatic lobe lesion concerning for metastasis. Splenic metastasis also possible.     MRI C-spine and L-spine 6/27 negative for acute findings. MRI T-spine 6/27 showed diffuse infiltrative process of the bone marrow of the thoracic spine both anteriorly and posteriorly with extraosseous extension a diffuse involvement of the neural foramen consistent with diffuse metastatic disease; extraosseous extension leads canal compromise and neural foraminal narrowing from the T8 to the T10-T11 disc space level; no abnormal signal thoracic spinal cord; prominent involvement of the posterior ribs in the thoracic region  with extension into the soft tissues.     1.  Large, B-cell lymphoma  RLE pain and swelling, suspect related to above.  Back pain and abdominal pain related to above.     * Initial presentation and imaging as above. Pt presented with right lower extremity edema but had been having back pain since the beginning of the year; also had c/o abdominal pain. GI and Oncology consulted on admit. Started on IV pantoprazole on admit.  * On 6/28, pt c/o worsened right thigh pain and swelling. CT thigh did not show any acute findings. Underwent EGD that showed normal esophagus; enlarged gastric folds with hemorrhage, biopsied; erosive gastropathy with no bleeding and no stigmata of recent bleeding; normal duodenal bulb, first portion of the duodenum and second portion of the duodenum. 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 Seen by Oncology and was started on dexamethasone.   * On 6/29, still with back pain, but thigh pain and swelling improved. Seen by Radiation Oncology and started on palliative spine radiation. Started scheduled acetaminophen and lidocaine patch.  * On 6/30, symptoms improved.     - 7/5/23 -Dr. Lord confirmed that path revealed diffuse, large B-cell lymphoma (Stage IV) and spoke with pt and family, patient was seen by Dr. Green on 7/6 and decision was made to start  the first course of R-CHOP,.  Prednisone was initiated on 7/6, patient will complete the infusion today, will need to decide on Neupogen dosage following that, once we have that information patient can transfer to his swing bed in [facility] he is palliative radiation treatment to the spine on 7/5..     - Continue dexamethasone 4 mg q12h.  - Continue acetaminophen 1000 mg TID and lidocaine patch.  - Continue PRN oxycodone 5-10 mg q4h. PRN cyclobenzaprine and PRN IV hydromorphone; minimize opioids as able.  - Continue to keep right lower extremity elevated.  Patient to be started on filgrastim IV infusion tonight as per oncology     2. Erosive gastropathy on EGD.  * Initial presentation as above. Started on pantoprazole on admit.  * EGD 6/28 as above.  - Continue pantoprazole .     3. Urinary retention, question from immobility, possible BPH.  Hematuria, suspect related to catheter trauma.  * On 6/28, pt noted with urinary retention requiring multiple SIC.  * On 6/29, started on tamsulosin. Continued to have retention with pain/discomfort with multiple straight catheterizations and Downing placed. Subsequently had bloody urine noted. Urology consulted.  - Continue Downing for 10-14d (Urology advised TOV no sooner than 7/5).  - Continue PRN irrigation.  - Continue tamsulosin.  - Follow-up with [facility] outpatient, appreciate help.  Voiding trial ordered on 7/5/2023, patient failed voiding trials, seen by [facility] again, plan is to leave Downing in and follow-up outpatient.        4. Constipation;  Patient is receiving MiraLAX daily but was unable to have bowel movement  Needing as needed Dulcolax suppository     5. Indeterminate pulmonary nodule.  * Initial presentation as above. CT also showed an indeterminate 9 mm pulmonary nodule right lung apex, could represent a metastatic lesion.  - PET CT outpatient as above.     6. Weakness and physical deconditioning due to multiple acute medical  "issues.  * Pt with significant weakness with difficulty walking due to above issues.  * Pt had an assisted fall and PT and OT consulted 6/28.  - Continue PT and OT.  - Continue to treat other issues as noted.  Patient has a swing bed in [place] close to his home, can discharge there once oncology decides on Neupogen dosage and timing.  This can be as early as 7/8, this was discussed with patient and family.       7. Hypertension (benign essential).  PTA: valsartan-HCTZ 320-25 mg daily.  * Valsartan-HCTZ held on admit due to hypokalemia.  - Continue to hold valsartan-HCTZ.  Valsartan restarted at 80 mg p.o. daily blood pressure well controlled systolics in the 110s to 120s currently     8. Hyponatremia, suspect nutritional/hypovolemia.  * Sodium 135 on admit. Pt started on LR on admit.  Hold     9. Hypokalemia.  Supplemented and resolved     10. Hyperlipidemia/dyslipidemia.  - Continue atorvastatin.         DVT Prophylaxis: Lovenox  Code Status: Full Code     52 MINUTES SPENT BY ME on the date of service doing chart review, history, exam, documentation & further activities per the note.  Disposition: Expected discharge 7/8  Clinically Significant Risk Factors              # Hypoalbuminemia: Lowest albumin = 3.4 g/dL at 7/8/2023  6:28 AM, will monitor as appropriate            # Obesity: Estimated body mass index is 32.56 kg/m  as calculated from the following:    Height as of this encounter: 1.803 m (5' 11\").    Weight as of this encounter: 105.9 kg (233 lb 7.5 oz).         Maria Guadalupe Clemente MD   Page [phone](7AM-6PM)      Interval History    Chart reviewed, care resumed.  Patient is resting in chair.  Denies any new complaints today.  Mild pain at the port site with certain positions recommended to avoid the positions that causes more pain.  Patient to be started on filgrastim IV infusion tonight.  No other acute events  -Data reviewed today: I reviewed all new labs and imaging results over the last 24 " hours.    Physical Exam     Vital Signs with Ranges  Temp:  [97.3  F (36.3  C)-98.7  F (37.1  C)] 98.7  F (37.1  C)  Pulse:  [] 86  Resp:  [18-20] 18  BP: (115-149)/(70-88) 128/75  SpO2:  [95 %-96 %] 96 %  I/O last 3 completed shifts:  In: 1200 [P.O.:1200]  Out: 4950 [Urine:4950]    Constitutional: Awake, alert, cooperative  Respiratory: CTA b/l   Cardiovascular: Regular rate and rhythm, normal S1 and S2, and no murmur noted  GI: Normal bowel sounds, soft, non-distended, non-tender  Skin/Integumen: No rashes, no cyanosis, no edema  Neuro : moving all 4 extremities, no focal deficit noted     Medications       acetaminophen  1,000 mg Oral TID     allopurinol  300 mg Oral Daily     atorvastatin  10 mg Oral Daily     dextrose 5% water  10-20 mL Intracatheter Daily at 8 pm     dextrose 5% water  10-20 mL Intracatheter Daily at 8 pm     enoxaparin ANTICOAGULANT  40 mg Subcutaneous Q24H     filgrastim-aafi  5 mcg/kg (Treatment Plan Recorded) Intravenous Daily at 8 pm     heparin  5-10 mL Intracatheter Q28 Days     heparin lock flush  5-10 mL Intracatheter Q24H     lidocaine-EPINEPHrine  1-20 mL Intradermal Once     pantoprazole  40 mg Oral BID AC     polyethylene glycol  17 g Oral Daily     predniSONE  100 mg Oral Daily     senna-docusate  1 tablet Oral At Bedtime     sodium chloride (PF)  10-20 mL Intracatheter Q28 Days     sodium chloride (PF)  3 mL Intracatheter Q8H     tamsulosin  0.4 mg Oral Daily     valsartan  80 mg Oral Daily       Data   Recent Labs   Lab 07/08/23  0628 07/07/23  0717 07/06/23  1120 07/05/23  0650 07/04/23  0620   WBC 7.3 8.7  --   --  9.4   HGB 13.9 13.2*  --   --  14.1   MCV 89 88  --   --  87    235  --   --  227    138 138  --  135*   POTASSIUM 4.0 4.5 4.8   < > 4.4   CHLORIDE 103 103 104  --  100   CO2 21* 24 21*  --  25   BUN 32.5* 31.0* 30.4*  --  28.8*   CR 0.65* 0.68 0.72  --  0.69   ANIONGAP 12 11 13  --  10   ESTEFANY 8.8 9.2 9.4  --  9.3   * 116* 100*  --  129*    ALBUMIN 3.4* 3.5 3.6   < >  --    PROTTOTAL 5.9* 6.1* 6.3*   < >  --    BILITOTAL 0.3 0.3 0.3   < >  --    ALKPHOS 62 61 66   < >  --    ALT 77* 30 25   < >  --    AST 35 16 18   < >  --     < > = values in this interval not displayed.     Recent Labs   Lab 07/08/23  0628 07/07/23  0717 07/06/23  1120 07/04/23  0620   * 116* 100* 129*       Imaging:   No results found for this or any previous visit (from the past 24 hour(s)).

## 2023-07-08 NOTE — PROGRESS NOTES
Service Date: 07/08/2023    SUBJECTIVE:  The patient is a 67-year-old gentleman with newly diagnosed stage IV diffuse large B-cell lymphoma.  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  The patient got first cycle of R-CHOP.  He tolerated it well.  The patient also received palliative radiation to T-spine, which was completed on 07/06/2023.    The patient is better.  Back pain is much improved.  He still has problem with the leg.  He is still not able to walk independently.    The patient also has urinary retention.  Urology following.  He has a Downing catheter.    No headache.  No dizziness.  No chest pain.  No shortness of breath.  No nausea or vomiting.  Appetite is fairly good.  Overall, he feels better.    PHYSICAL EXAMINATION:    GENERAL:  He is alert and oriented x 3.  Not in distress.    VITALS:  Reviewed.    Rest of systems not examined.    LABS:  Reviewed.    ASSESSMENT:    1.  A 67-year-old gentleman with newly diagnosed stage IV diffuse large B-cell lymphoma. Patient is status post first cycle of R-CHOP.  He also received palliative radiation to T-spine.  2.  Leg weakness.  3.  Back pain, improved.    PLAN:  1.  Patient doing well from lymphoma.  He tolerated first cycle of chemotherapy well.  Explained to him he needs total of 6 cycles of chemotherapy.    Patient [place] close to [place].  He/his wife is going to set up an appointment to follow-up with a local oncology clinic.  He will get rest of the chemotherapy closer to his home.     The patient is on Neupogen.  We will give it while he is in the hospital.  We can stop it at discharge.     2.  Patient back pain has improved significantly.  I am hoping his leg weakness improves.    3.  He had few questions, which were all answered.  Case discussed with Dr. Clemente.    TOTAL TIME SPENT:  35 minutes.  Time spent in today's visit, review of chart/investigations today, communicating with other providers and documentation today.    Alexei Lord MD        D:  2023   T: 2023   MT: md    Name:     LYLA BOURGEOIS  MRN:      [id number]        Account:      [id number]   :      1955           Service Date: 2023       Document: [id number]

## 2023-07-08 NOTE — PLAN OF CARE
Pt is A&Ox4. VSS on RA. [place]. C/o R sided abdominal discomfort, declined interventions. Alleviated with repositioning. Downing patent with good UOP. No BM today, declined miralax. Port HL. Up A2 with binu-steady. Up to chair for meals. On a mechanical dental soft diet, good appetite. [place] [facility] [place].

## 2023-07-08 NOTE — PROGRESS NOTES
Care Management Follow Up    Length of Stay (days): 11    Expected Discharge Date:       Concerns to be Addressed:       Patient plan of care discussed at interdisciplinary rounds: Yes    Anticipated Discharge Disposition: Skilled Nursing Facility, Other (Comments) (swing bed)     Anticipated Discharge Services: None (swing bed)  Anticipated Discharge DME: None    Patient/family educated on Medicare website which has current facility and service quality ratings: yes  Education Provided on the Discharge Plan: Yes  Patient/Family in Agreement with the Plan: yes    Referrals Placed by CM/SW:  (Transitional care)  Private pay costs discussed: Not applicable    Additional Information:  SW spoke with [facility] Swing bed nurses station and informed they can accept patient on Neupogen once plan is determined by oncology. SW will continue to follow.      GIGI Schuler    [facility]

## 2023-07-09 PROCEDURE — 250N000013 HC RX MED GY IP 250 OP 250 PS 637: Performed by: INTERNAL MEDICINE

## 2023-07-09 PROCEDURE — 99231 SBSQ HOSP IP/OBS SF/LOW 25: CPT | Performed by: INTERNAL MEDICINE

## 2023-07-09 PROCEDURE — 250N000012 HC RX MED GY IP 250 OP 636 PS 637: Performed by: INTERNAL MEDICINE

## 2023-07-09 PROCEDURE — 250N000011 HC RX IP 250 OP 636: Mod: JZ | Performed by: INTERNAL MEDICINE

## 2023-07-09 PROCEDURE — 250N000013 HC RX MED GY IP 250 OP 250 PS 637: Performed by: HOSPITALIST

## 2023-07-09 PROCEDURE — 99232 SBSQ HOSP IP/OBS MODERATE 35: CPT | Performed by: INTERNAL MEDICINE

## 2023-07-09 PROCEDURE — 250N000011 HC RX IP 250 OP 636: Performed by: INTERNAL MEDICINE

## 2023-07-09 PROCEDURE — 250N000013 HC RX MED GY IP 250 OP 250 PS 637: Performed by: PHYSICIAN ASSISTANT

## 2023-07-09 PROCEDURE — 120N000001 HC R&B MED SURG/OB

## 2023-07-09 PROCEDURE — 258N000003 HC RX IP 258 OP 636: Performed by: INTERNAL MEDICINE

## 2023-07-09 RX ADMIN — DEXTROSE MONOHYDRATE 480 MCG: 50 INJECTION, SOLUTION INTRAVENOUS at 20:10

## 2023-07-09 RX ADMIN — PANTOPRAZOLE SODIUM 40 MG: 40 TABLET, DELAYED RELEASE ORAL at 08:05

## 2023-07-09 RX ADMIN — ATORVASTATIN CALCIUM 10 MG: 10 TABLET, FILM COATED ORAL at 08:05

## 2023-07-09 RX ADMIN — Medication 5 ML: at 08:08

## 2023-07-09 RX ADMIN — TAMSULOSIN HYDROCHLORIDE 0.4 MG: 0.4 CAPSULE ORAL at 08:05

## 2023-07-09 RX ADMIN — ACETAMINOPHEN 1000 MG: 500 TABLET ORAL at 16:21

## 2023-07-09 RX ADMIN — BISACODYL 10 MG: 10 SUPPOSITORY RECTAL at 16:26

## 2023-07-09 RX ADMIN — ACETAMINOPHEN 1000 MG: 500 TABLET ORAL at 21:58

## 2023-07-09 RX ADMIN — ENOXAPARIN SODIUM 40 MG: 40 INJECTION SUBCUTANEOUS at 11:40

## 2023-07-09 RX ADMIN — VALSARTAN 80 MG: 80 TABLET, FILM COATED ORAL at 08:05

## 2023-07-09 RX ADMIN — CYCLOBENZAPRINE 5 MG: 5 TABLET, FILM COATED ORAL at 01:44

## 2023-07-09 RX ADMIN — PANTOPRAZOLE SODIUM 40 MG: 40 TABLET, DELAYED RELEASE ORAL at 16:21

## 2023-07-09 RX ADMIN — SENNOSIDES AND DOCUSATE SODIUM 1 TABLET: 50; 8.6 TABLET ORAL at 21:58

## 2023-07-09 RX ADMIN — PREDNISONE 100 MG: 50 TABLET ORAL at 08:05

## 2023-07-09 RX ADMIN — OXYCODONE HYDROCHLORIDE 5 MG: 5 TABLET ORAL at 01:44

## 2023-07-09 RX ADMIN — Medication 1 LOZENGE: at 00:17

## 2023-07-09 RX ADMIN — Medication 5 ML: at 20:56

## 2023-07-09 RX ADMIN — CYCLOBENZAPRINE 5 MG: 5 TABLET, FILM COATED ORAL at 21:58

## 2023-07-09 RX ADMIN — CYCLOBENZAPRINE 5 MG: 5 TABLET, FILM COATED ORAL at 12:41

## 2023-07-09 RX ADMIN — ALLOPURINOL 300 MG: 300 TABLET ORAL at 08:05

## 2023-07-09 RX ADMIN — POLYETHYLENE GLYCOL 3350 17 G: 17 POWDER, FOR SOLUTION ORAL at 08:13

## 2023-07-09 RX ADMIN — ACETAMINOPHEN 1000 MG: 500 TABLET ORAL at 08:05

## 2023-07-09 ASSESSMENT — ACTIVITIES OF DAILY LIVING (ADL)
ADLS_ACUITY_SCORE: 32

## 2023-07-09 NOTE — PROGRESS NOTES
Care Management Follow Up    Length of Stay (days): 12    Expected Discharge Date: 07/10/2023     Concerns to be Addressed:       Patient plan of care discussed at interdisciplinary rounds: Yes    Anticipated Discharge Disposition: Skilled Nursing Facility, Other (Comments) (swing bed)     Anticipated Discharge Services: None (swing bed)  Anticipated Discharge DME: None    Patient/family educated on Medicare website which has current facility and service quality ratings: yes  Education Provided on the Discharge Plan: Yes  Patient/Family in Agreement with the Plan: yes    Referrals Placed by CM/SW:  (Transitional care)  Private pay costs discussed: Not applicable    Additional Information:  AVTAR informed patient will be ready for discharge tomorrow. AVTAR called Elizabeth at [facility] and informed her of this. AVTAR asked to call in AM to determine timing for transport as she does not know what the schedule will look like tomorrow.    Addendum: AVTAR spoke with Enrique and wife Erika and discussed plans for tomorrow. Erika in agreement with  w/c costs and inquired if she can ride with. AVTAR explained it is up to the individual  and they will not be able to let her know until they arrive for transport. Erika would like to be updated right away in the morning once we know what time [facility] can accept. [facility] can accept on Neupogen.    Robbie Torres, GIGI    [facility]

## 2023-07-09 NOTE — PROGRESS NOTES
[facility]    Hospitalist Progress Note    Assessment & Plan   Enrique Dumas is a 67 year old male with history including HTN; HLD; and congenital [place]; who presented 6/27/2023 with right leg swelling with recent back pain. 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      On initially evaluation, pt was afebrile, hemodynamically stable, not hypoxic. ECG showed SR with NSTWA inferior limb leads, no acute ischemic changes. Labs notable for CBC normal; BMP with sodium 135, potassium 2.7; LFT's and lipase normal; trop negative; d-dimer 0.69; UA negative for signs of infection.     Right lower extremity US 6/28 negative for DVT.      CT CAP 6/28 showed no evidence for pulmonary emboli; proximal gastric wall thickening concerning for an infiltrative or neoplastic process which included a soft tissue mass along the posterior margin of the proximal stomach confluent with the adjacent spleen and pancreatic tail; sclerotic changes involving the T10 -T12 vertebral bodies with destructive changes of T12 and abnormal surrounding paravertebral soft tissue extending into the posterior mediastinum, findings also concerning for a neoplastic process; indeterminate 9 mm pulmonary nodule right lung apex could represent a metastatic lesion; hypodense right hepatic lobe lesion concerning for metastasis. Splenic metastasis also possible.     MRI C-spine and L-spine 6/27 negative for acute findings. MRI T-spine 6/27 showed diffuse infiltrative process of the bone marrow of the thoracic spine both anteriorly and posteriorly with extraosseous extension a diffuse involvement of the neural foramen consistent with diffuse metastatic disease; extraosseous extension leads canal compromise and neural foraminal narrowing from the T8 to the T10-T11 disc space level; no abnormal signal thoracic spinal cord; prominent involvement of the posterior ribs in the thoracic region  with extension into the soft tissues.     1.  Large, B-cell lymphoma  RLE pain and swelling, suspect related to above.  Back pain and abdominal pain related to above.     * Initial presentation and imaging as above. Pt presented with right lower extremity edema but had been having back pain since the beginning of the year; also had c/o abdominal pain. GI and Oncology consulted on admit. Started on IV pantoprazole on admit.  * On 6/28, pt c/o worsened right thigh pain and swelling. CT thigh did not show any acute findings. Underwent EGD that showed normal esophagus; enlarged gastric folds with hemorrhage, biopsied; erosive gastropathy with no bleeding and no stigmata of recent bleeding; normal duodenal bulb, first portion of the duodenum and second portion of the duodenum. 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 Seen by Oncology and was started on dexamethasone.   * On 6/29, still with back pain, but thigh pain and swelling improved. Seen by Radiation Oncology and started on palliative spine radiation. Started scheduled acetaminophen and lidocaine patch.  * On 6/30, symptoms improved.     - 7/5/23 -Dr. Lord confirmed that path revealed diffuse, large B-cell lymphoma (Stage IV) and spoke with pt and family, patient was seen by Dr. Green on 7/6 and decision was made to start  the first course of R-CHOP,.  Prednisone was initiated on 7/6, patient will complete the infusion today, will need to decide on Neupogen dosage following that, once we have that information patient can transfer to his swing bed in [facility] he is palliative radiation treatment to the spine on 7/5..     - Continue dexamethasone 4 mg q12h.  - Continue acetaminophen 1000 mg TID and lidocaine patch.  - Continue PRN oxycodone 5-10 mg q4h. PRN cyclobenzaprine and PRN IV hydromorphone; minimize opioids as able.  - Continue to keep right lower extremity elevated.  Patient to be started on filgrastim IV infusion at night   Needs total of 10day course of Neupogen per Oncology   BAck pain much improved now      2. Erosive gastropathy on EGD.  * Initial presentation as above. Started on pantoprazole on admit.  * EGD 6/28 as above.  - Continue pantoprazole .     3. Urinary retention, question from immobility, possible BPH.  Hematuria, suspect related to catheter trauma.  * On 6/28, pt noted with urinary retention requiring multiple SIC.  * On 6/29, started on tamsulosin. Continued to have retention with pain/discomfort with multiple straight catheterizations and Downing placed. Subsequently had bloody urine noted. Urology consulted.  - Continue Downing for 10-14d (Urology advised TOV no sooner than 7/5).  - Continue PRN irrigation.  - Continue tamsulosin.  - Follow-up with [facility] outpatient, appreciate help.  Voiding trial ordered on 7/5/2023, patient failed voiding trials, seen by [facility] again, plan is to leave Downing in and follow-up outpatient.        4. Constipation;  Patient is receiving MiraLAX daily but was unable to have bowel movement  Needing as needed Dulcolax suppository     5. Indeterminate pulmonary nodule.  * Initial presentation as above. CT also showed an indeterminate 9 mm pulmonary nodule right lung apex, could represent a metastatic lesion.  - PET CT outpatient as  "above.     6. Weakness and physical deconditioning due to multiple acute medical issues.  * Pt with significant weakness with difficulty walking due to above issues.  * Pt had an assisted fall and PT and OT consulted 6/28.  - Continue PT and OT.  - Continue to treat other issues as noted.  Patient has a swing bed in [place] close to his home, can discharge there once oncology decides on Neupogen dosage and timing.  This can be as early as 7/8, this was discussed with patient and family.       7. Hypertension (benign essential).  PTA: valsartan-HCTZ 320-25 mg daily.  * Valsartan-HCTZ held on admit due to hypokalemia.  - Continue to hold valsartan-HCTZ.  Valsartan restarted at 80 mg p.o. daily blood pressure well controlled systolics in the 110s to 120s currently     8. Hyponatremia, suspect nutritional/hypovolemia.  * Sodium 135 on admit. Pt started on LR on admit.  Hold     9. Hypokalemia.  Supplemented and resolved     10. Hyperlipidemia/dyslipidemia.  - Continue atorvastatin.         DVT Prophylaxis: Lovenox  Code Status: Full Code     52 MINUTES SPENT BY ME on the date of service doing chart review, history, exam, documentation & further activities per the note.  Disposition: Expected discharge 7/9  Clinically Significant Risk Factors              # Hypoalbuminemia: Lowest albumin = 3.4 g/dL at 7/8/2023  6:28 AM, will monitor as appropriate            # Obesity: Estimated body mass index is 32.56 kg/m  as calculated from the following:    Height as of this encounter: 1.803 m (5' 11\").    Weight as of this encounter: 105.9 kg (233 lb 7.5 oz).         Maria Guadalupe Clemente MD   Page [phone](7AM-6PM)      Interval History    Patient is resting in bed .  Denies any new complaints today.  No other acute events  -Data reviewed today: I reviewed all new labs and imaging results over the last 24 hours.    Physical Exam     Vital Signs with Ranges  Temp:  [97.7  F (36.5  C)-98.6  F (37  C)] 97.7  F (36.5  C)  Pulse:  " [] 83  Resp:  [18] 18  BP: (130-147)/(79-88) 147/88  SpO2:  [96 %-98 %] 96 %  I/O last 3 completed shifts:  In: 480 [P.O.:480]  Out: 4950 [Urine:4950]    Constitutional: Awake, alert, cooperative  Respiratory: CTA b/l   Cardiovascular: Regular rate and rhythm, normal S1 and S2, and no murmur noted  GI: Normal bowel sounds, soft, non-distended, non-tender  Skin/Integumen: No rashes, no cyanosis, no edema  Neuro : moving all 4 extremities, no focal deficit noted     Medications       acetaminophen  1,000 mg Oral TID     allopurinol  300 mg Oral Daily     atorvastatin  10 mg Oral Daily     dextrose 5% water  10-20 mL Intracatheter Daily at 8 pm     dextrose 5% water  10-20 mL Intracatheter Daily at 8 pm     enoxaparin ANTICOAGULANT  40 mg Subcutaneous Q24H     filgrastim-aafi  5 mcg/kg (Treatment Plan Recorded) Intravenous Daily at 8 pm     heparin  5-10 mL Intracatheter Q28 Days     heparin lock flush  5-10 mL Intracatheter Q24H     lidocaine-EPINEPHrine  1-20 mL Intradermal Once     pantoprazole  40 mg Oral BID AC     polyethylene glycol  17 g Oral Daily     predniSONE  100 mg Oral Daily     senna-docusate  1 tablet Oral At Bedtime     sodium chloride (PF)  10-20 mL Intracatheter Q28 Days     sodium chloride (PF)  3 mL Intracatheter Q8H     tamsulosin  0.4 mg Oral Daily     valsartan  80 mg Oral Daily       Data   Recent Labs   Lab 07/08/23  0628 07/07/23  0717 07/06/23  1120 07/05/23  0650 07/04/23  0620   WBC 7.3 8.7  --   --  9.4   HGB 13.9 13.2*  --   --  14.1   MCV 89 88  --   --  87    235  --   --  227    138 138  --  135*   POTASSIUM 4.0 4.5 4.8   < > 4.4   CHLORIDE 103 103 104  --  100   CO2 21* 24 21*  --  25   BUN 32.5* 31.0* 30.4*  --  28.8*   CR 0.65* 0.68 0.72  --  0.69   ANIONGAP 12 11 13  --  10   ESTEFANY 8.8 9.2 9.4  --  9.3   * 116* 100*  --  129*   ALBUMIN 3.4* 3.5 3.6   < >  --    PROTTOTAL 5.9* 6.1* 6.3*   < >  --    BILITOTAL 0.3 0.3 0.3   < >  --    ALKPHOS 62 61 66   < >   --    ALT 77* 30 25   < >  --    AST 35 16 18   < >  --     < > = values in this interval not displayed.     Recent Labs   Lab 07/08/23  0628 07/07/23  0717 07/06/23  1120 07/04/23  0620   * 116* 100* 129*       Imaging:   No results found for this or any previous visit (from the past 24 hour(s)).

## 2023-07-09 NOTE — PLAN OF CARE
Pt is A&Ox4. VSS on RA. [place]. C/o back discomfort, gave x1 PRN flexeril, effective. Downing patent with good UOP. No BM today. Gave PRN suppository per pt request, no result yet. Port HL, good BR. Up A2 with binu-steady. Up to chair for meals. On a mechanical dental soft diet, good appetite. Took shower. Discharge to swing bed in [place] tomorrow, time pending.

## 2023-07-09 NOTE — PLAN OF CARE
A&Ox4; [place], can read lips.  VSS, occasional HTN; RA.  Complained of ongoing back pain; scheduled Tylenol, PRN oxycodone and Flexeril given.  Port HL; good blood return.  Up with assist x 2, GB and sarasteady.  Tolerating mechanical soft diet without problems.  Downing in-place with good output; no BM this shift.  Chemo precautions maintained.  Discharge to swing bed pending medication plan.

## 2023-07-09 NOTE — PROGRESS NOTES
Service Date: 07/09/2023    SUBJECTIVE:  The patient is a 67-year-old gentleman with diffuse large B-cell lymphoma.  He received first cycle of R-CHOP, which he tolerated well.  He also received palliative radiation to thoracic spine.    The patient says that back pain has resolved.  Main problem is weakness in the leg.  He is not able to ambulate independently.    No headache.  No dizziness.  No chest pain or shortness of breath.  No abdominal pain, nausea or vomiting.  Appetite good.    PHYSICAL EXAMINATION:    He is alert, oriented x 3.  Not in distress.  Rest of systems not examined.    ASSESSMENT:     1.  A 67-year-old gentleman with stage IV diffuse large B-cell lymphoma status post first cycle of R-CHOP chemotherapy, which he tolerated well.  2.  Back pain secondary to lymphoma.  The patient is status post radiation.  Pain has resolved.  3.  Leg weakness.  4.  Other medical problems including hypertension and hyperlipidemia.    PLAN:     1.  The patient is doing well.  Back pain has resolved.  He still has leg weakness.    The patient will be going to swing bed.  I am hoping that with physical therapy, his leg weakness will improve.     2.  Discussed regarding lymphoma.  He got first cycle of R-CHOP.  He needs 5 more cycles.  The patient plans to get it closer to his home.  Wife will arrange an oncology appointment.    3.  The patient is on Neupogen.  That will be continued.  Plan is for a total of 10 days of Neupogen.  As per the nurse, swing bed will be able to give him Neupogen.    4.  The patient and his wife had a few questions, which were all answered.  Our clinic contact information given to the wife.  I advised her to call us with any questions or concerns.  I explained to them that if there is delay in getting an oncology appointment in [place], the patient can come to our cancer center in [place] and we can give him the chemotherapy. Case discussed with Dr. Clemente.    TOTAL VISIT TIME:  Twenty five  minutes.  Time was spent in today's visit, review of chart/investigations today, and documentation today.      Alexei Lord MD        D: 2023   T: 2023   MT: raul    Name:     LYLA BOURGEOISJose  MRN:      [id number]        Account:      [id number]   :      1955           Service Date: 2023       Document: [id number]

## 2023-07-10 VITALS
BODY MASS INDEX: 34.2 KG/M2 | HEART RATE: 95 BPM | RESPIRATION RATE: 16 BRPM | HEIGHT: 71 IN | WEIGHT: 244.27 LBS | DIASTOLIC BLOOD PRESSURE: 95 MMHG | SYSTOLIC BLOOD PRESSURE: 138 MMHG | OXYGEN SATURATION: 96 % | TEMPERATURE: 99 F

## 2023-07-10 LAB — PLATELET # BLD AUTO: 158 10E3/UL (ref 150–450)

## 2023-07-10 PROCEDURE — 250N000013 HC RX MED GY IP 250 OP 250 PS 637: Performed by: INTERNAL MEDICINE

## 2023-07-10 PROCEDURE — 250N000011 HC RX IP 250 OP 636: Performed by: INTERNAL MEDICINE

## 2023-07-10 PROCEDURE — 99239 HOSP IP/OBS DSCHRG MGMT >30: CPT | Performed by: INTERNAL MEDICINE

## 2023-07-10 PROCEDURE — 250N000013 HC RX MED GY IP 250 OP 250 PS 637: Performed by: PHYSICIAN ASSISTANT

## 2023-07-10 PROCEDURE — 250N000013 HC RX MED GY IP 250 OP 250 PS 637: Performed by: HOSPITALIST

## 2023-07-10 PROCEDURE — 250N000012 HC RX MED GY IP 250 OP 636 PS 637: Performed by: INTERNAL MEDICINE

## 2023-07-10 PROCEDURE — 85049 AUTOMATED PLATELET COUNT: CPT | Performed by: INTERNAL MEDICINE

## 2023-07-10 PROCEDURE — 250N000011 HC RX IP 250 OP 636: Mod: JZ | Performed by: INTERNAL MEDICINE

## 2023-07-10 RX ORDER — TAMSULOSIN HYDROCHLORIDE 0.4 MG/1
0.4 CAPSULE ORAL DAILY
Qty: 30 CAPSULE | Refills: 0 | DISCHARGE
Start: 2023-07-11

## 2023-07-10 RX ORDER — AMOXICILLIN 250 MG
2 CAPSULE ORAL AT BEDTIME
Qty: 30 TABLET | Refills: 0 | DISCHARGE
Start: 2023-07-10

## 2023-07-10 RX ORDER — CYCLOBENZAPRINE HCL 10 MG
10 TABLET ORAL 3 TIMES DAILY PRN
DISCHARGE
Start: 2023-07-10

## 2023-07-10 RX ORDER — FILGRASTIM 480 UG/.8ML
480 INJECTION, SOLUTION INTRAVENOUS; SUBCUTANEOUS DAILY
Qty: 1.6 ML | Refills: 0 | DISCHARGE
Start: 2023-07-10 | End: 2023-07-18

## 2023-07-10 RX ORDER — POLYETHYLENE GLYCOL 3350 17 G/17G
17 POWDER, FOR SOLUTION ORAL DAILY
Qty: 510 G | Refills: 0 | DISCHARGE
Start: 2023-07-10

## 2023-07-10 RX ORDER — OXYCODONE HYDROCHLORIDE 5 MG/1
5-10 TABLET ORAL EVERY 4 HOURS PRN
Qty: 10 TABLET | Refills: 0 | Status: SHIPPED | DISCHARGE
Start: 2023-07-10

## 2023-07-10 RX ORDER — PANTOPRAZOLE SODIUM 40 MG/1
40 TABLET, DELAYED RELEASE ORAL
Qty: 60 TABLET | Refills: 0 | DISCHARGE
Start: 2023-07-10

## 2023-07-10 RX ORDER — VALSARTAN 80 MG/1
80 TABLET ORAL DAILY
Qty: 30 TABLET | Refills: 0 | DISCHARGE
Start: 2023-07-11

## 2023-07-10 RX ORDER — BISACODYL 10 MG
10 SUPPOSITORY, RECTAL RECTAL DAILY PRN
Qty: 20 SUPPOSITORY | Refills: 0 | DISCHARGE
Start: 2023-07-10

## 2023-07-10 RX ORDER — ALLOPURINOL 300 MG/1
300 TABLET ORAL DAILY
Qty: 7 TABLET | Refills: 0 | DISCHARGE
Start: 2023-07-11 | End: 2023-07-18

## 2023-07-10 RX ORDER — ACETAMINOPHEN 500 MG
1000 TABLET ORAL 3 TIMES DAILY
Qty: 100 TABLET | Refills: 0 | DISCHARGE
Start: 2023-07-10

## 2023-07-10 RX ADMIN — ACETAMINOPHEN 1000 MG: 500 TABLET ORAL at 08:51

## 2023-07-10 RX ADMIN — POLYETHYLENE GLYCOL 3350 17 G: 17 POWDER, FOR SOLUTION ORAL at 08:52

## 2023-07-10 RX ADMIN — ALLOPURINOL 300 MG: 300 TABLET ORAL at 08:51

## 2023-07-10 RX ADMIN — TAMSULOSIN HYDROCHLORIDE 0.4 MG: 0.4 CAPSULE ORAL at 08:52

## 2023-07-10 RX ADMIN — PANTOPRAZOLE SODIUM 40 MG: 40 TABLET, DELAYED RELEASE ORAL at 16:03

## 2023-07-10 RX ADMIN — ACETAMINOPHEN 1000 MG: 500 TABLET ORAL at 16:03

## 2023-07-10 RX ADMIN — ENOXAPARIN SODIUM 40 MG: 40 INJECTION SUBCUTANEOUS at 11:27

## 2023-07-10 RX ADMIN — VALSARTAN 80 MG: 80 TABLET, FILM COATED ORAL at 08:51

## 2023-07-10 RX ADMIN — ATORVASTATIN CALCIUM 10 MG: 10 TABLET, FILM COATED ORAL at 08:51

## 2023-07-10 RX ADMIN — PREDNISONE 100 MG: 50 TABLET ORAL at 08:52

## 2023-07-10 RX ADMIN — HEPARIN SODIUM (PORCINE) LOCK FLUSH IV SOLN 100 UNIT/ML 5 ML: 100 SOLUTION at 16:38

## 2023-07-10 RX ADMIN — Medication 5 ML: at 06:34

## 2023-07-10 RX ADMIN — Medication 1 LOZENGE: at 11:28

## 2023-07-10 RX ADMIN — PANTOPRAZOLE SODIUM 40 MG: 40 TABLET, DELAYED RELEASE ORAL at 06:35

## 2023-07-10 ASSESSMENT — ACTIVITIES OF DAILY LIVING (ADL)
ADLS_ACUITY_SCORE: 32
ADLS_ACUITY_SCORE: 35
ADLS_ACUITY_SCORE: 32

## 2023-07-10 NOTE — PLAN OF CARE
A&Ox4.  VSS; MAIDA KONG.  Complains of mild back pain; scheduled Tylenol and PRN Flexeril effective. Port HL; good blood return.  Downing in-place with good output, patient will; no BM overnight.  Assist x 2, GB and sarasteady; patient is fairly independent with transfers.  Discharge planned for today to a swing bed in [place]; unknown time, SW following.

## 2023-07-10 NOTE — PLAN OF CARE
"  Nursing [id number] shift  Pt is 3 days post R-CHOP chemo regimen (started 7-7-23) for newly diagnosed DLBCL mets to stomach and T spine. Completed 5 treatments of palliative radiation. The severe back pain reported on admission has greatly improved. Denied pain this shift. A&Ox4.  VSS ezcept low grad temp T99. No chills. Limited hearing in L ear - hearing aides in place   [place]. . Port HL  Downing in-place with good output, patient will. Remains unsteady, unable to stand up alone requires assist x 1-2/GB  with Tricia-Steady device.    Pt yelling at writer saying he smells perfume and it causing a headache. I told pt I never wear any perfume at work and I do use unscented lotions. Wife present, aware, came up closely to writer and sniffed my uniform and stated it could be the laundry soap or shampoo I use, but neither the pt nor the wife could identify what exact type of fragrance he's sensitive to. Pt yelled very loudly for me to leave the room and not to come back in. Writer entered Fragrance as an allergy (since it was not ever entered on this admission) and spoke with the Charge Nurse regarding above who replied unable to pbtain replacement. Writer asked her RN \"jamir\" Jenn ROBLERO to give pt his afternoon meds and if the NA is unable,to also answer his call light. Wife is at bedside majority of shift  Pt has been deemed medically stable for transfer today to a [facility] \"swing bed\" in [place], [place] pending SW/CC success in arranging medical transportation  "

## 2023-07-10 NOTE — DISCHARGE SUMMARY
"[facility]  Hospitalist Discharge Summary      Date of Admission:  6/27/2023  Date of Discharge:  7/10/2023  Discharging Provider: Maria Guadalupe Clemente MD  Discharge Service: Hospitalist Service    Discharge Diagnoses   Please see hospital course     Clinically Significant Risk Factors     # Obesity: Estimated body mass index is 34.07 kg/m  as calculated from the following:    Height as of this encounter: 1.803 m (5' 11\").    Weight as of this encounter: 110.8 kg (244 lb 4.3 oz).      HTN   Pt was on antiplatelet medication PTA     Follow-ups Needed After Discharge   Follow-up Appointments     Follow Up ([facility])      Recommend follow up with urology - can be locally with [facility] or   closer to home with [facility] urology.     IF you discharge with wick catheter, recommend appointment in 1-2 weeks   after discharge for catheter removal.   IF you discharge WITHOUT wick catheter, recommend appointment within 6   months for cystoscopy.     [facility] [address]  You may call [phone] with any questions or concerns.   Central Appointment #: [phone]        Follow Up and recommended labs and tests      Follow up with retirement physician.  The following labs/tests are   recommended: recheck CBC, BMP in 1week .  F/u with Oncology in 2 weeks   F/u with URology in 2weeks               Discharge Disposition   Discharged to TCU swing bed   Condition at discharge: Stable    Hospital Course   Enrique Dumas is a 67 year old male with history including HTN; HLD; and congenital [place]; who presented 6/27/2023 with right leg swelling with recent back pain. 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      On initially evaluation, pt was afebrile, hemodynamically stable, not hypoxic. ECG showed SR with NSTWA inferior limb leads, no acute ischemic changes. Labs notable for CBC normal; BMP with " sodium 135, potassium 2.7; LFT's and lipase normal; trop negative; d-dimer 0.69; UA negative for signs of infection.     Right lower extremity US 6/28 negative for DVT.      CT CAP 6/28 showed no evidence for pulmonary emboli; proximal gastric wall thickening concerning for an infiltrative or neoplastic process which included a soft tissue mass along the posterior margin of the proximal stomach confluent with the adjacent spleen and pancreatic tail; sclerotic changes involving the T10 -T12 vertebral bodies with destructive changes of T12 and abnormal surrounding paravertebral soft tissue extending into the posterior mediastinum, findings also concerning for a neoplastic process; indeterminate 9 mm pulmonary nodule right lung apex could represent a metastatic lesion; hypodense right hepatic lobe lesion concerning for metastasis. Splenic metastasis also possible.     MRI C-spine and L-spine 6/27 negative for acute findings. MRI T-spine 6/27 showed diffuse infiltrative process of the bone marrow of the thoracic spine both anteriorly and posteriorly with extraosseous extension a diffuse involvement of the neural foramen consistent with diffuse metastatic disease; extraosseous extension leads canal compromise and neural foraminal narrowing from the T8 to the T10-T11 disc space level; no abnormal signal thoracic spinal cord; prominent involvement of the posterior ribs in the thoracic region with extension into the soft tissues.     1.  Diffuse Large, B-cell lymphoma  Metaastic neoplasm of the stomach   RLE pain and swelling, suspect related to above.  Back pain and abdominal pain related to above.     * Initial presentation and imaging as above. Pt presented with right lower extremity edema but had been having back pain since the beginning of the year; also had c/o abdominal pain. GI and Oncology consulted on admit. Started on IV pantoprazole on admit.  * On 6/28, pt c/o worsened right thigh pain and swelling. CT thigh  did not show any acute findings. Underwent EGD that showed normal esophagus; enlarged gastric folds with hemorrhage, biopsied; erosive gastropathy with no bleeding and no stigmata of recent bleeding; normal duodenal bulb, first portion of the duodenum and second portion of the duodenum. 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 Seen by Oncology and was started on dexamethasone.   * On 6/29, still with back pain, but thigh pain and swelling improved. Seen by Radiation Oncology and started on palliative spine radiation. Started scheduled acetaminophen and lidocaine patch.  * On 6/30, symptoms improved.     - 7/5/23 -Dr. Lord confirmed that path revealed diffuse, large B-cell lymphoma (Stage IV) and spoke with pt and family, patient was seen by Dr. Green on 7/6 and decision was made to start the first course of R-CHOP,.  Prednisone was initiated on 7/6, patient will complete the infusion today, will need to decide on Neupogen dosage following that, once we have that information patient can transfer to his swing bed in [facility]  Completed he is palliative radiation treatment to the spine on 7/5..     -  - Continue acetaminophen 1000 mg TID   - Continue PRN oxycodone 5-10 mg q4h. PRN cyclobenzaprine and PRN IV hydromorphone; minimize opioids as able.  - Continue to  keep right lower extremity elevated.  Patient to be started on filgrastim IV infusion 2 doses given so far    Needs total of 10day course of Neupogen per Oncology   BAck pain much improved now      2. Erosive gastropathy on EGD.  * Initial presentation as above. Started on pantoprazole on admit.  * EGD 6/28 as above.  - Continue pantoprazole .     3. Urinary retention, question from immobility, possible BPH.  Hematuria, suspect related to catheter trauma.  * On 6/28, pt noted with urinary retention requiring multiple SIC.  * On 6/29, started on tamsulosin. Continued to have retention with pain/discomfort with multiple straight catheterizations and Downing placed. Subsequently had bloody urine noted. Urology consulted.  - Continue Downing for 10-14d (Urology advised TOV no sooner than 7/5).  - Continue PRN irrigation.  - Continue tamsulosin.  - Follow-up with [facility] outpatient, appreciate help.  Voiding trial ordered on 7/5/2023, patient failed voiding trials, seen by [facility] again, plan is to leave Downing in and follow-up outpatient.        4. Constipation;  Patient is receiving MiraLAX daily but was unable to have bowel movement  Needing as needed Dulcolax suppository     5. Indeterminate pulmonary nodule.  * Initial presentation as above. CT also showed an indeterminate 9 mm pulmonary nodule right lung apex, could represent a metastatic lesion.  - PET CT outpatient as above.     6. Weakness and physical deconditioning due to multiple acute medical issues.  * Pt with significant weakness with difficulty walking due to above issues.  * Pt had an assisted fall and PT and OT consulted 6/28.  - Continue PT and OT.  - Continue to treat other issues as noted.   Going to swing bed      7. Hypertension (benign essential).  PTA: valsartan-HCTZ 320-25 mg daily.  * Valsartan-HCTZ held on admit due to hypokalemia.  - Continue to hold valsartan-HCTZ.  Valsartan restarted at 80 mg p.o. daily blood pressure well  controlled systolics in the 110s to 120s currently     8. Hyponatremia, suspect nutritional/hypovolemia.  * Sodium 135 on admit. Pt started on LR on admit.sodium improved to 136-138     9. Hypokalemia.  Supplemented and resolved     10. Hyperlipidemia/dyslipidemia.  - Continue atorvastatin.         DVT Prophylaxis: Lovenox  Code Status: Full Code       Disposition: Expected discharge 7/10/23     Consultations This Hospital Stay   GASTROENTEROLOGY IP CONSULT  HEMATOLOGY & ONCOLOGY IP CONSULT  PHYSICAL THERAPY ADULT IP CONSULT  OCCUPATIONAL THERAPY ADULT IP CONSULT  RADIATION ONCOLOGY IP CONSULT  UROLOGY IP CONSULT  CARE MANAGEMENT / SOCIAL WORK IP CONSULT  INTERVENTIONAL RADIOLOGY ADULT/PEDS IP CONSULT  VASCULAR ACCESS ADULT IP CONSULT  UROLOGY IP CONSULT  VASCULAR ACCESS ADULT IP CONSULT  PHYSICAL THERAPY ADULT IP CONSULT  OCCUPATIONAL THERAPY ADULT IP CONSULT    Code Status   Full Code    Time Spent on this Encounter   I, Maria Guadalupe Clemente MD, personally saw the patient today and spent greater than 30 minutes discharging this patient.       Maria Guadalupe Clemente MD  [place] [address] [facility]  [address] [place] [address], [address]  [facility] [address]  Phone: [phone]  ______________________________________________________________________    Physical Exam   Vital Signs: Temp: 99  F (37.2  C) Temp src: Oral BP: (!) 138/95 Pulse: 95   Resp: 16 SpO2: 96 % O2 Device: None (Room air)    Weight: 244 lbs 4.31 oz  General Appearance: Alert, awake, NAD   Respiratory: CTA b/l   Cardiovascular: RRR  GI: soft, NT, ND, BS+  Skin: warm and dry   Other:  wick in place  With clear urine        Primary Care Physician   Lori Hallman    Discharge Orders      Follow Up ([facility])    Recommend follow up with urology - can be locally with [facility] or closer to home with [facility] urology.     IF you discharge with wick catheter, recommend appointment in 1-2 weeks after discharge for catheter removal.   IF you discharge WITHOUT  wick catheter, recommend appointment within 6 months for cystoscopy.     [facility]  [address] [place] [address]  You may call [phone] with any questions or concerns.   Central Appointment #: [phone]     General info for SNF    Length of Stay Estimate: Short Term Care: Estimated # of Days 31-90  Condition at Discharge: Stable  Level of care:skilled   Rehabilitation Potential: Good  Admission H&P remains valid and up-to-date: Yes  Recent Chemotherapy: N/A  Use Nursing Home Standing Orders: Yes     Mantoux instructions    Give two-step Mantoux (PPD) Per Facility Policy Yes     Follow Up and recommended labs and tests    Follow up with [facility] physician.  The following labs/tests are recommended: recheck CBC, BMP in 1week .  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 Keep wick in until out pt follow up with URology     Activity - Up with nursing assistance     Full Code     Physical Therapy Adult Consult    Evaluate and treat as clinically indicated.    Reason:  metastatic Lymphoma     Occupational Therapy Adult Consult    Evaluate and treat as clinically indicated.    Reason:  metastatic lymphoma     Fall precautions     Diet    Follow this diet upon discharge: Orders Placed This Encounter      Room Service      Snacks/Supplements Adult: Other; pt may order supplements prn; With Meals      Snacks/Supplements Adult: Other; vanilla [employer] packet with dinner meal; With Meals      Mechanical/Dental Soft Diet       Significant Results and Procedures   Most Recent 3 CBC's:Recent Labs   Lab Test 07/10/23  0628 07/08/23  0628 07/07/23  0717 07/04/23  0620   WBC  --  7.3 8.7 9.4   HGB  --  13.9 13.2* 14.1   MCV  --  89 88 87    191 235 227     Most Recent 3 BMP's:Recent Labs   Lab Test  07/08/23 0628 07/07/23 0717 07/06/23  1120    138 138   POTASSIUM 4.0 4.5 4.8   CHLORIDE 103 103 104   CO2 21* 24 21*   BUN 32.5* 31.0* 30.4*   CR 0.65* 0.68 0.72   ANIONGAP 12 11 13   ESTEFANY 8.8 9.2 9.4   * 116* 100*     Most Recent 2 LFT's:Recent Labs   Lab Test 07/08/23 0628 07/07/23 0717   AST 35 16   ALT 77* 30   ALKPHOS 62 61   BILITOTAL 0.3 0.3     Most Recent 3 INR's:No lab results found.  Most Recent INR's and Anticoagulation Dosing History:  Anticoagulation Dose History         No data to display              ,   Results for orders placed or performed during the hospital encounter of 06/27/23   US Lower Extremity Venous Duplex Right    Narrative    EXAM: US LOWER EXTREMITY VENOUS DUPLEX RIGHT  LOCATION: [facility]  DATE: 6/27/2023    INDICATION: Right lower extremity swelling.  COMPARISON: None available.  TECHNIQUE: Venous Duplex ultrasound of the right lower extremity with and without compression, augmentation and duplex. Color flow and spectral Doppler with waveform analysis performed.    FINDINGS: Exam includes the common femoral, femoral, popliteal, and contralateral common femoral veins as well as segmentally visualized deep calf veins and greater saphenous vein.     RIGHT: No deep vein thrombosis. No superficial thrombophlebitis.       Impression    IMPRESSION:    No deep venous thrombosis in the visualized right lower extremity.   CT Chest (PE) Abdomen Pelvis w Contrast    Narrative    EXAM: CT CHEST PE ABDOMEN PELVIS W CONTRAST  LOCATION: [facility]  DATE: 6/27/2023    INDICATION: Left flank pain, abdominal pain for months.  Elevated D dimer.  COMPARISON: None.  TECHNIQUE: CT chest pulmonary angiogram and routine CT abdomen pelvis with IV contrast. Arterial phase through the chest and venous phase through the abdomen and pelvis. Multiplanar reformats and MIP reconstructions were performed. Dose reduction   techniques were used.    CONTRAST: 115mL Isovue 370    FINDINGS:  ANGIOGRAM CHEST: Pulmonary arteries are normal caliber and negative for pulmonary emboli. Thoracic aorta is negative for dissection. No CT evidence of right heart strain.     LUNGS AND PLEURA: Elevated right hemidiaphragm. Mosaic attenuation pattern both lower lobes. No acute infiltrates or effusions. 9 mm pulmonary nodule right upper lobe on image 56 of series 6.    MEDIASTINUM/AXILLAE: Calcified mediastinal and left hilar lymph nodes. Soft tissue thickening within the posterior mediastinum surrounding the descending thoracic aorta and obscuring the retrocrural space.    CORONARY ARTERY CALCIFICATION: Moderate.    HEPATOBILIARY: Diffuse fatty infiltration of the liver. Subtle hypodense right hepatic lobe lesion measuring 3.1 x 1.6 cm on image 45 of series 11.    PANCREAS: Soft tissue thickening arising from or abutting the pancreatic tail and confluent with the adjacent gastric wall and medial aspect of the spleen. The head and body of the pancreas are normal. No pancreatic ductal dilatation.    SPLEEN: Calcified granulomas and several subtle hypodense splenic lesions.    ADRENAL GLANDS: Normal.    KIDNEYS/BLADDER: Normal.    BOWEL: Wall thickening involving the cardia and proximal and mid body of the stomach. Additional soft tissue prominence adjacent to the posterior gastric wall measuring 5.1 x 1.8 cm on image 51 of series 11, abutting the medial margin of the spleen and   the pancreatic tail. The small bowel and colon are unremarkable. No evidence for obstruction. Normal appendix.    LYMPH NODES: Enlarged left lobe along the posterior gastric wall measuring 1.7 x 1.3 cm image 57 of series 11.    VASCULATURE: Incidental retroaortic left renal vein.    PELVIC ORGANS: Prostatic hypertrophy. No pelvic ascites.    MUSCULOSKELETAL: Sclerotic changes and destructive changes of the T10 vertebral body. Additional sclerotic changes of the T10 and T11 vertebral bodies, with soft  tissue thickening in the paravertebral soft tissues extending into the retroperitoneum and   surrounding the descending thoracic aorta as described above. Additional sclerotic vertebral bodies upper thoracic spine. Left inguinal hernia contains fat and a knuckle of nondilated proximal sigmoid colon.      Impression    IMPRESSION:  1.  No evidence for pulmonary emboli.  2.  Mosaic attenuation both lower lobes suggesting air trapping.  3.  No renal calculi or hydronephrosis.  4.  Proximal gastric wall thickening concerning for an infiltrative or neoplastic process. This includes a soft tissue mass along the posterior margin of the proximal stomach confluent with the adjacent spleen and pancreatic tail. GI consultation   recommended.  5.  Sclerotic changes involving the T10 -T12 vertebral bodies with destructive changes of T12 and abnormal surrounding paravertebral soft tissue extending into the posterior mediastinum. Findings also concerning for a neoplastic process. MRI of the spine   recommended for further evaluation.  6.  Indeterminate 9 mm pulmonary nodule right lung apex could represent a metastatic lesion.  7.  Hypodense right hepatic lobe lesion concerning for metastasis. Splenic metastasis also possible.  8.  Results discussed with Dr. Phil Hamm on 06/27/2023 at 9:35 PM.   Cervical spine MRI w/o contrast    Narrative    EXAM: MR CERVICAL SPINE W/O CONTRAST  LOCATION: [facility]  DATE: 6/28/2023    INDICATION: Back pain with possible metastatic disease.  COMPARISON: None.  TECHNIQUE: MRI Cervical Spine without IV contrast.    FINDINGS:   There is good anatomic alignment of the cervical spine. The vertebral body heights and the disc space heights are well-maintained throughout and have appropriate signal characteristics for the patient's age. There is no evidence of bone marrow edema.   There is no abnormal signal or change in size of the cervical spinal cord. There is no  evidence of an intracanal mass or hemorrhage. No extraspinal abnormality.    Craniovertebral junction and C1-C2: Normal.    C2-C3: Normal disc height. No herniation. Normal facets. No spinal canal or neural foraminal stenosis.     C3-C4: Normal disc height. No herniation. Normal facets. No spinal canal or neural foraminal stenosis.     C4-C5: Normal disc height. No herniation. Normal facets. No spinal canal or neural foraminal stenosis.     C5-C6: There is a mild loss of disc space height and signal. There is a shallow posterior disc osteophyte complex more prominent to the left posterior lateral region. There is no evidence of canal compromise. This along with the facet arthropathy and   uncinate spurring leads to moderate left neural foraminal narrowing.     C6-C7: There is a mild loss of disc space height and signal. There is a shallow right paracentral disc protrusion but no evidence of canal compromise. There is prominent left facet arthropathy leading to moderate left neural foraminal narrowing.     C7-T1: Normal disc height. No herniation. Normal facets. No spinal canal or neural foraminal stenosis.      Impression    IMPRESSION:  1.  Good anatomic alignment and vertebral body heights maintained.  2.  No abnormal signal cervical spinal cord.  3.  No evidence of canal compromise but neural foraminal narrowing as described above.     Thoracic spine MRI w/o contrast    Narrative    EXAM: MR THORACIC SPINE W/O CONTRAST  LOCATION: [facility]  DATE: 6/28/2023    INDICATION: metastatic disease, sclerotic lesions at T10 T12 on CT with back pain.  COMPARISON: 06/27/2023.  TECHNIQUE: Routine Thoracic Spine MRI without IV contrast.    FINDINGS:   There is good anatomic alignment of the thoracic spine. There is an infiltrative process with bone marrow edema seen involving the T1-T5 vertebral bodies and the T8-T10 vertebral bodies which involves both the anterior and posterior elements. There is    also probable slight involvement of the anterior superior corners of the T6 and T8 vertebral bodies. This is highly suggestive of metastatic disease. There is a pathologic compression fracture with a mild loss of height along the superior and inferior   endplates of the T10 vertebral body. There is also slight bulging of the posterior wall of the T3 vertebral body but no evidence of canal compromise at this level. There is diffuse involvement of the posterior ribs throughout the thoracic region with   extension into the adjacent soft tissues.    At the T3-T5 vertebral body levels, there is extra osseous extension into the surrounding soft tissues and the neural foramen and slight extension into the left side of the spinal canal at the T3 vertebral body level but there is no evidence of canal   compromise. This does lead to neural foraminal narrowing bilaterally at the levels.    Due to the diffuse involvement of the posterior ribs with extraosseous extension there is also probable bilateral neural foraminal narrowing at the T6 and T7 vertebral body levels.    At the T8 vertebral body level, there is extra osseous extension into the ventral and dorsal portion of the left side of the mild canal along with the neural foramen bilaterally. There is no evidence of canal compromise at the T8 level. There is further   extraosseous extension into the spinal canal bilaterally from the T9-T10 to the T10-T11 disc space level.  This encases the thoracic spinal cord and leads to moderate canal compromise along with involvement of the neural foramen bilaterally.    There is no abnormal signal or change in size of the thoracic spinal cord.      Impression    IMPRESSION:  1.  Diffuse infiltrative process of the bone marrow of the thoracic spine both anteriorly and posteriorly with extraosseous extension a diffuse involvement of the neural foramen consistent with diffuse metastatic disease.  2. Extraosseous extension leads canal  compromise and  neural foraminal narrowing from the T8 to the T10-T11 disc space level.  4.  No abnormal signal thoracic spinal cord.  3. Prominent involvement of the posterior ribs in the thoracic region with extension into the soft tissues.   Lumbar spine MRI w/o contrast    Narrative    EXAM: MR LUMBAR SPINE W/O CONTRAST  LOCATION: [facility]  DATE: 6/28/2023    INDICATION: Low back pain and possible metastatic disease.  COMPARISON: None.  TECHNIQUE: Routine Lumbar Spine MRI without IV contrast.    FINDINGS:   Nomenclature is based on 5 lumbar type vertebral bodies. There is good anatomic alignment. The vertebral body heights are well-maintained throughout and have appropriate signal characteristics for the patient's age. There is no evidence of bone marrow   edema. Normal distal spinal cord and cauda equina with conus medullaris at T12.. No extraspinal abnormality. Unremarkable visualized bony pelvis.    T12-L1: Normal disc height and signal. No herniation. Normal facets. No spinal canal or neural foraminal stenosis.     L1-L2: Normal disc height and signal. No herniation. Normal facets. No spinal canal or neural foraminal stenosis.    L2-L3: Normal disc height and signal. No herniation. Normal facets. No spinal canal or neural foraminal stenosis.     L3-L4: There is a slight loss of disc space height and signal. There is a diffuse annular bulge more prominent to the posterior lateral region along with a shallow right paracentral disc protrusion. This along with the facet arthropathy leads to mild to   moderate canal compromise, mild right lateral recess narrowing and mild bilateral neural foraminal narrowing.    L4-L5: There is a mild loss of disc space height and signal. There is a mild diffuse annular bulge more prominent to the right posterior lateral region but there is no evidence of canal compromise. There is facet arthropathy leading to right lateral   recess narrowing and  minimal right neural foraminal narrowing.    L5-S1: There is a normal disc space height and signal. There is a broad central disc protrusion but no evidence of canal compromise. There is facet arthropathy but the lateral recesses and the neural foramen are patent bilaterally.      Impression    IMPRESSION:  1.  Good anatomic alignment and vertebral. Maintained and no evidence of bone marrow edema.  2.  Degenerative disc disease with canal compromise and neural foraminal narrowing from the L3-L4 to the L5-S1 disc space level as described above.   CT Femur Right w/o Contrast    Narrative    CT RIGHT LOWER EXTREMITY WITHOUT CONTRAST 6/28/2023 2:20 PM    CLINICAL HISTORY: 67-year-old with right thigh pain and swelling.    TECHNIQUE: Multiple contiguous axial CT images were obtained of the  right femur without IV contrast. Data was reformatted into bone and  soft tissue algorithms, in coronal and sagittal planes. Dose reduction  techniques were used.    COMPARISON: None.    FINDINGS:    Normal bones. No fracture or bone lesion.    Normal CT appearance of the muscles. No focal swelling or edema  visualized. No discrete intramuscular hematoma identified.    Mild prepatellar soft tissue swelling. No significant knee joint  effusion.    Normal soft tissues otherwise.    Contrast in bladder from prior CT.      Impression    IMPRESSION:    1.  Normal right thigh CT. No findings to account for the patient's  thigh swelling.    2.  Nonspecific prepatellar soft tissue edema/swelling without a  discrete bursal effusion.    PIPO LONGO MD         SYSTEM ID:  [id number]   IR Chest Port Placement > 5 Yrs of Age    Narrative    PORT PLACEMENT 7/7/2023 9:34 AM    INDICATION: Chronic intravenous access required for drug infusion.  Patient with history of lymphoma, request made for port and catheter  placement.    LOCATION: Right internal jugular vein.    PROCEDURE: Ultrasound was used to localize and document patency of  the  internal jugular vein. A permanent image of the vein was recorded.  Local anesthesia was administered to the skin over the vein and a 4 mm  incision was made. Ultrasound was used to place a 6F peel-away sheath  in the vein using standard technique.      Maximal Sterile Barrier Technique Utilized: Cap AND mask AND sterile  gown AND sterile gloves AND sterile full body drape AND hand hygiene  AND skin preparation 2% chlorhexidine for cutaneous antisepsis (or  acceptable alternative antiseptics). Sterile Ultrasound Technique  Utilized ?Sterile gel AND sterile probe covers.    Lidocaine was then administered in the subcutaneous tissue over the  clavicle to a point about 5 cm below the midclavicle. A transverse 2  cm long incision was made at this point. Blunt dissection was carried  out to create a small subcutaneous pocket cephalad to the incision.  The 6F port catheter was brought through the tract between the two  incisions and trimmed to appropriate length. The catheter was attached  to the port and the port was brought into the pocket. The pocket was  flushed with antibiotic solution.      The catheter was then inserted into the superior vena cava through the  jugular sheath. It was adjusted so that it lay well with no kinking.  The port was flushed with heparinized solution. The port incision was  closed with interrupted 3-0 Vicryl and Dermabond adhesive. The neck  incision was closed with adhesive.  Dressings were applied.      Complications:  None  Sedation: A moderate level of sedation was achieved with 3 mg  midazolam                  100 mcg fentanyl   Vital signs and sedation monitored by our nursing staff under my  supervision.    Sedation time:  24 minutes  Fluoroscopy time:  2.2 minutes   Air Kerma: 9 mGy  Contrast given:  None  Local anesthetic:  20 mL of 1% lidocaine    FINDINGS:  Fluoroscopic guidance with a permanent image confirmed the  port catheter tip location is at the right atrial/SVC  junction,  confirmed with single spot fluoroscopic image.      Impression    IMPRESSION: Successful placement of right internal jugular slim  PowerPort and catheter. The port is ready for immediate use.    EKTA GROSS MD         SYSTEM ID:  [id number]   Echocardiogram Complete     Value    LVEF  65%    Narrative    [id number]  [id number]  [id number]  [id number]^MUSTAPHA^ANDRES     [facility]  Echocardiography [facility]  [address]  [address]  [place] [address]  Phone [phone]  Fax [phone]     Name: LYLA BOURGEOIS  MRN: [id number]  : 1955  Study Date: 2023 02:03 PM  Age: 67 yrs  Gender: Male  Patient [facility]: [facility]  Reason For Study: Chemo  Ordering Physician: ANDRES LUCIANO  Referring Physician: ANDRES LUCIANO  Performed By: LEATHA Talavera     BSA: 2.3 m2  Height: 71 in  Weight: 234 lb  HR: 73  BP: 137/82 mmHg  ______________________________________________________________________________  Procedure  Complete Echo Adult. Optison (NDC #[id number]) given intravenously.     ______________________________________________________________________________  Interpretation Summary     1. Normal LV size. The visual ejection fraction is estimated at 65%.  2. The right ventricle is normal in structure, function and size.  3. No valve disease.     No previous echo for comparison.  ______________________________________________________________________________  Left Ventricle  The left ventricle is normal in size. There is normal left ventricular wall  thickness. The visual ejection fraction is estimated at 65%. Left ventricular  diastolic function is normal. Normal left ventricular wall motion.     Right Ventricle  The right ventricle is normal in structure, function and size.     Atria  Normal left atrial size. Right atrial size is normal. There is no atrial shunt  seen.     Mitral Valve  The mitral valve leaflets appear normal. There is no  evidence of stenosis,  fluttering, or prolapse.     Tricuspid Valve  The tricuspid valve is normal in structure and function.     Aortic Valve  The aortic valve is normal in structure and function.     Pulmonic Valve  The pulmonic valve is normal in structure and function.     Vessels  Normal ascending, transverse (arch), and descending aorta. Inferior vena cava  not well visualized for estimation of right atrial pressure.     Pericardium  There is no pericardial effusion.     Rhythm  Sinus rhythm was noted.     ______________________________________________________________________________  MMode/2D Measurements & Calculations  IVSd: 1.1 cm  LVIDd: 5.1 cm  LVIDs: 2.1 cm  LVPWd: 1.0 cm  FS: 58.9 %  LV mass(C)d: 202.7 grams  LV mass(C)dI: 89.9 grams/m2  Ao root diam: 3.5 cm     asc Aorta Diam: 3.5 cm  LVOT diam: 2.4 cm  LVOT area: 4.4 cm2  LA Volume (BP): 57.6 ml  LA Volume Index (BP): 25.6 ml/m2     LA Volume Indexed (AL/bp): 25.7 ml/m2  RV Base: 2.7 cm  RWT: 0.40  TAPSE: 2.2 cm     Doppler Measurements & Calculations  MV E max buzz: 52.8 cm/sec  MV A max bzuz: 68.3 cm/sec  MV E/A: 0.77  LV IVRT: 0.09 sec  MV dec time: 0.19 sec  PA acc time: 0.11 sec  Pulm Sys Buzz: 65.8 cm/sec  Pulm Gray Buzz: 29.0 cm/sec  Pulm A Revs Ubzz: 45.6 cm/sec  Pulm S/D: 2.3  E/E' av.5  Lateral E/e': 5.3  Medial E/e': 7.6  RV S Buzz: 18.3 cm/sec     ______________________________________________________________________________  Report approved by: Arturo Mcneil 2023 03:28 PM               Discharge Medications   Current Discharge Medication List      START taking these medications    Details   acetaminophen (TYLENOL) 500 MG tablet Take 2 tablets (1,000 mg) by mouth 3 times daily  Qty: 100 tablet, Refills: 0    Associated Diagnoses: Acute bilateral back pain, unspecified back location      allopurinol (ZYLOPRIM) 300 MG tablet Take 1 tablet (300 mg) by mouth daily for 7 days  Qty: 7 tablet, Refills: 0    Associated Diagnoses:  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)      bisacodyl (DULCOLAX) 10 MG suppository Place 1 suppository (10 mg) rectally daily as needed for constipation  Qty: 20 suppository, Refills: 0    Associated Diagnoses: Acute bilateral back pain, unspecified back location      filgrastim (NEUPOGEN) 480 MCG/0.8ML SOSY syringe Inject 0.8 mLs (480 mcg) Subcutaneous daily for 8 days  Qty: 1.6 mL, Refills: 0    Associated Diagnoses: 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)      oxyCODONE (ROXICODONE) 5 MG tablet Take 1-2 tablets (5-10 mg) by mouth every 4 hours as needed for moderate pain  Qty: 10 tablet, Refills: 0    Associated Diagnoses: Acute bilateral back pain, unspecified back location      pantoprazole (PROTONIX) 40 MG EC tablet Take 1 tablet (40 mg) by mouth 2 times daily (before meals)  Qty: 60 tablet, Refills: 0    Associated Diagnoses: Malignant neoplasm of stomach, unspecified location (H)      polyethylene glycol (MIRALAX) 17 GM/Dose powder Take 17 g by mouth daily  Qty: 510 g, Refills: 0    Associated Diagnoses: Acute bilateral back pain, unspecified back location      senna-docusate (SENOKOT-S/PERICOLACE) 8.6-50 MG tablet Take 2 tablets by mouth At Bedtime  Qty: 30 tablet, Refills: 0    Associated Diagnoses: Acute bilateral back pain, unspecified back location      tamsulosin (FLOMAX) 0.4 MG capsule Take 1 capsule (0.4 mg) by mouth daily  Qty: 30 capsule, Refills: 0    Associated Diagnoses: Urinary retention with incomplete bladder emptying      valsartan (DIOVAN) 80 MG tablet Take 1 tablet (80 mg) by mouth daily  Qty: 30 tablet, Refills: 0    Associated Diagnoses: Benign essential hypertension         CONTINUE these medications which have CHANGED    Details   cyclobenzaprine (FLEXERIL) 10 MG tablet Take 1 tablet (10 mg) by mouth 3 times daily as needed for muscle spasms (back pain)    Associated Diagnoses: Acute bilateral back pain, unspecified back location          CONTINUE these medications which have NOT CHANGED    Details   aspirin (ASA) 325 MG EC tablet Take 325 mg by mouth At Bedtime      atorvastatin (LIPITOR) 10 MG tablet Take 10 mg by mouth daily      fish oil-omega-3 fatty acids 1000 MG capsule Take 2 g by mouth every morning         STOP taking these medications       valsartan-hydrochlorothiazide (DIOVAN HCT) 320-25 MG tablet Comments:   Reason for Stopping:             Allergies   Allergies   Allergen Reactions     Lidocaine Rash     Sustained rash from lidocaine patches in past

## 2023-07-10 NOTE — PROGRESS NOTES
Care Management Discharge Note    Discharge Date: 07/10/2023       Discharge Disposition: Other (Comments) (Swing bed at [facility])    Discharge Services: None (swing bed)    Discharge DME: None    Discharge Transportation: family or friend will provide    Private pay costs discussed: transportation costs    Does the patient's insurance plan have a 3 day qualifying hospital stay waiver?  No    PAS Confirmation Code: [id number]  Patient/family educated on Medicare website which has current facility and service quality ratings: yes    Education Provided on the Discharge Plan: Yes  Persons Notified of Discharge Plans: care team/spouse  Patient/Family in Agreement with the Plan: yes    Handoff Referral Completed: Yes    Additional Information:    Sw received confirmation from  transport that they will be able to transport pt via w/c ride to [facility] swing bed location in [place] today (address:  [address] [place] [address], on med surg unit, arrive at ED door) with service window of [phone].  HE confirmed that they do not anticipate any delays or reasons to reschedule ride.  Sw received call from  billing (ph:  [phone]) with estimated cost being $697.93 and asked that spouse call them to further discuss payment.      Sw called spouse to provide update on discharge date/time; spouse is pleased discharge is occurring today and is hopeful HE team will allow her to transport with pt.  If not, spouse will make other arrangements.  Sw provided spouse with the billing number to  and quoted her with the above cost; spouse seemed surprised with cost but did state she was aware cost per mile was $5+/mile.  Spouse plans on calling  billing today.    Cristela paged MD and RN to complete hand off reports on pt per facility request.  MD called and left vm for Dr. Ryan Ross at [facility].  Cristela called Jesica at [facility] to provide update on discharge date/time and Sw provided her with PAS number.  Cristela faxed  orders to:  [phone].      PAS-RR    D: Per DHS regulation, SW completed and submitted PAS-RR to [place] [employer] Direct Connect via the Senior LinkAge Line.  PAS-RR confirmation # is : [id number]    I: SW spoke with pt and they are aware a PAS-RR has been submitted.  SW reviewed with pt that they may be contacted for a follow up appointment within 10 days of hospital discharge if their SNF stay is < 30 days.  Contact information for Senior LinkAge Line was also provided.    A: pt verbalized understanding.    P: Further questions may be directed to [facility] LinkAge Line at #[phone], option #4 for PAS-RR staff.          Morenita Weinberg, DRESW

## 2023-07-10 NOTE — PLAN OF CARE
"Occupational Therapy Discharge Summary    Reason for therapy discharge:    Discharged to Discharged to [facility] \"swing bed\" in [place], [place]    Progress towards therapy goal(s). See goals on Care Plan in [facility] electronic health record for goal details.  Goals not met.  Barriers to achieving goals:   discharge from facility.    Therapy recommendation(s):    Continued therapy is recommended.  Rationale/Recommendations:  Pt is currently functioning well below baseline. Pt currently requiring assist x 2 or binu steady w/ all transfers due to decreased motor control in BLE's. Pt reports being IND w/ ADL's at baseline and working 9-13 hour days. At this time, pt would benefit from TCU stay to help return to PLOF. Pt will be receiving radiation here at hospital which may help improve motor control..            "

## 2023-07-10 NOTE — DISCHARGE INSTRUCTIONS
Read the attached education sheets for further discharge instructions   Filgrastin (Neupogen) injection  Giving a Subcutaneous injection  Discharge instructions for Chemotherapy  Discharge instructions for Radiation Therapy  Allopurinol

## 2023-07-11 NOTE — PLAN OF CARE
Physical Therapy Discharge Summary    Reason for therapy discharge:    Discharged to transitional care facility.  All goals and outcomes met, no further needs identified.    Progress towards therapy goal(s). See goals on Care Plan in [facility] electronic health record for goal details.  Goals partially met.  Barriers to achieving goals:   limited tolerance for therapy.    Therapy recommendation(s):    Continued therapy is recommended.  Rationale/Recommendations:  To improve IND with mobility as pt below baseline at this time .

## 2023-07-17 ENCOUNTER — DOCUMENTATION ONLY (OUTPATIENT)
Dept: OTHER | Facility: CLINIC | Age: 68
End: 2023-07-17
Payer: COMMERCIAL

## 2023-07-18 ENCOUNTER — TELEPHONE (OUTPATIENT)
Dept: ONCOLOGY | Facility: CLINIC | Age: 68
End: 2023-07-18
Payer: COMMERCIAL

## 2023-07-18 NOTE — TELEPHONE ENCOUNTER
Writer called and LVM requesting a return call to confirm if he has oncology care scheduled or if further assistance is needed.     Will wait for return call.     Iman Haines RN

## 2023-07-18 NOTE — TELEPHONE ENCOUNTER
----- Message from Alexei Lord MD sent at 7/16/2023 10:39 AM CDT -----  Monica,    Can you please call his wife.  Patient is hard of hearing.  Find out if they have been able to establish care with oncologist in that area.    If they are not able to get in with oncologist, please schedule him to get his second cycle of R-CHOP chemotherapy at [facility].  Patient can see me or NP when he comes for chemotherapy.    bk

## 2023-07-21 NOTE — TELEPHONE ENCOUNTER
Writer called and spoke with the patient . Patient was unable to hear writer or use phone and stated his wife was not home. He requested I call back or send message through the mail.     Writer also called patient's spouse and unable to reach her or LVM.     Iman Haines RN

## 2023-07-21 NOTE — TELEPHONE ENCOUNTER
received a return call from patient's wife , rEika, and was informed patient is in a swing bed and Oncologic care has been set up near [place] in [facility].     Iman Haines RN

## 2023-07-25 LAB — INTERPRETATION: NORMAL

## (undated) RX ORDER — LIDOCAINE HYDROCHLORIDE 10 MG/ML
INJECTION, SOLUTION INFILTRATION; PERINEURAL
Status: DISPENSED
Start: 2023-07-07

## (undated) RX ORDER — FENTANYL CITRATE 50 UG/ML
INJECTION, SOLUTION INTRAMUSCULAR; INTRAVENOUS
Status: DISPENSED
Start: 2023-07-07

## (undated) RX ORDER — FENTANYL CITRATE 50 UG/ML
INJECTION, SOLUTION INTRAMUSCULAR; INTRAVENOUS
Status: DISPENSED
Start: 2023-06-28

## (undated) RX ORDER — HEPARIN SODIUM (PORCINE) LOCK FLUSH IV SOLN 100 UNIT/ML 100 UNIT/ML
SOLUTION INTRAVENOUS
Status: DISPENSED
Start: 2023-07-07